# Patient Record
Sex: FEMALE | Race: WHITE | NOT HISPANIC OR LATINO | Employment: OTHER | URBAN - METROPOLITAN AREA
[De-identification: names, ages, dates, MRNs, and addresses within clinical notes are randomized per-mention and may not be internally consistent; named-entity substitution may affect disease eponyms.]

---

## 2017-01-18 ENCOUNTER — ALLSCRIPTS OFFICE VISIT (OUTPATIENT)
Dept: OTHER | Facility: OTHER | Age: 67
End: 2017-01-18

## 2017-02-14 ENCOUNTER — ALLSCRIPTS OFFICE VISIT (OUTPATIENT)
Dept: OTHER | Facility: OTHER | Age: 67
End: 2017-02-14

## 2017-02-14 DIAGNOSIS — Z12.39 ENCOUNTER FOR OTHER SCREENING FOR MALIGNANT NEOPLASM OF BREAST: ICD-10-CM

## 2017-03-15 ENCOUNTER — GENERIC CONVERSION - ENCOUNTER (OUTPATIENT)
Dept: OTHER | Facility: OTHER | Age: 67
End: 2017-03-15

## 2017-03-15 LAB
A/G RATIO (HISTORICAL): 2.3 (ref 1.2–2.2)
ALBUMIN SERPL BCP-MCNC: 4.6 G/DL (ref 3.6–4.8)
ALP SERPL-CCNC: 57 IU/L (ref 39–117)
ALT SERPL W P-5'-P-CCNC: 26 IU/L (ref 0–32)
AST SERPL W P-5'-P-CCNC: 25 IU/L (ref 0–40)
BILIRUB SERPL-MCNC: 0.3 MG/DL (ref 0–1.2)
BUN SERPL-MCNC: 13 MG/DL (ref 8–27)
BUN/CREA RATIO (HISTORICAL): 19 (ref 11–26)
CALCIUM SERPL-MCNC: 10.7 MG/DL (ref 8.7–10.3)
CHLORIDE SERPL-SCNC: 104 MMOL/L (ref 96–106)
CHOLEST SERPL-MCNC: 194 MG/DL (ref 100–199)
CO2 SERPL-SCNC: 23 MMOL/L (ref 18–29)
CREAT SERPL-MCNC: 0.68 MG/DL (ref 0.57–1)
EGFR AFRICAN AMERICAN (HISTORICAL): 107 ML/MIN/1.73
EGFR-AMERICAN CALC (HISTORICAL): 93 ML/MIN/1.73
GLUCOSE SERPL-MCNC: 94 MG/DL (ref 65–99)
HDLC SERPL-MCNC: 81 MG/DL
LDLC SERPL CALC-MCNC: 100 MG/DL (ref 0–99)
POTASSIUM SERPL-SCNC: 4.7 MMOL/L (ref 3.5–5.2)
SODIUM SERPL-SCNC: 141 MMOL/L (ref 134–144)
TOT. GLOBULIN, SERUM (HISTORICAL): 2 G/DL (ref 1.5–4.5)
TOTAL PROTEIN (HISTORICAL): 6.6 G/DL (ref 6–8.5)
TRIGL SERPL-MCNC: 66 MG/DL (ref 0–149)
TSH SERPL DL<=0.05 MIU/L-ACNC: 5.74 UIU/ML (ref 0.45–4.5)

## 2017-03-16 LAB
INTERPRETATION (HISTORICAL): NORMAL
PTH-INTACT SERPL-MCNC: 92 PG/ML (ref 15–65)

## 2017-03-20 ENCOUNTER — GENERIC CONVERSION - ENCOUNTER (OUTPATIENT)
Dept: OTHER | Facility: OTHER | Age: 67
End: 2017-03-20

## 2017-04-21 ENCOUNTER — ALLSCRIPTS OFFICE VISIT (OUTPATIENT)
Dept: OTHER | Facility: OTHER | Age: 67
End: 2017-04-21

## 2017-04-21 DIAGNOSIS — S20.219A CONTUSION OF FRONT WALL OF THORAX: ICD-10-CM

## 2017-07-11 ENCOUNTER — HOSPITAL ENCOUNTER (OUTPATIENT)
Dept: RADIOLOGY | Facility: CLINIC | Age: 67
Discharge: HOME | End: 2017-07-11
Payer: COMMERCIAL

## 2017-07-11 ENCOUNTER — GENERIC CONVERSION - ENCOUNTER (OUTPATIENT)
Dept: OTHER | Facility: OTHER | Age: 67
End: 2017-07-11

## 2017-07-11 ENCOUNTER — TRANSCRIBE ORDERS (OUTPATIENT)
Dept: RADIOLOGY | Facility: CLINIC | Age: 67
End: 2017-07-11

## 2017-07-11 DIAGNOSIS — Z12.39 ENCOUNTER FOR OTHER SCREENING FOR MALIGNANT NEOPLASM OF BREAST: ICD-10-CM

## 2017-07-11 PROCEDURE — G0202 SCR MAMMO BI INCL CAD: HCPCS

## 2017-07-16 ENCOUNTER — GENERIC CONVERSION - ENCOUNTER (OUTPATIENT)
Dept: OTHER | Facility: OTHER | Age: 67
End: 2017-07-16

## 2017-09-15 ENCOUNTER — TRANSCRIBE ORDERS (OUTPATIENT)
Dept: ADMINISTRATIVE | Facility: HOSPITAL | Age: 67
End: 2017-09-15

## 2017-09-15 ENCOUNTER — GENERIC CONVERSION - ENCOUNTER (OUTPATIENT)
Dept: OTHER | Facility: OTHER | Age: 67
End: 2017-09-15

## 2017-09-15 DIAGNOSIS — E83.52 HYPERCALCEMIA: Primary | ICD-10-CM

## 2017-11-06 ENCOUNTER — ALLSCRIPTS OFFICE VISIT (OUTPATIENT)
Dept: OTHER | Facility: OTHER | Age: 67
End: 2017-11-06

## 2017-11-07 NOTE — PROGRESS NOTES
Assessment  1  Benign essential hypertension (401 1) (I10)   2  Hypercholesterolemia (272 0) (E78 00)   3  Hyperparathyroidism (252 00) (E21 3)   4  Mild intermittent asthma without complication (245 91) (H07 03)    Plan  Benign essential hypertension    · Valsartan 80 MG Oral Tablet; Take 1 tablet daily  Health Maintenance    · Fluzone High-Dose 0 5 ML Intramuscular Suspension Prefilled Syringe  Hypercholesterolemia    · Rosuvastatin Calcium 10 MG Oral Tablet; take 1 tablet daily at bedtime    Discussion/Summary    Endocrinologist consult and recent blood work was requested from the specialist to the office in 1 year  The treatment plan was reviewed with the patient/guardian  The patient/guardian understands and agrees with the treatment plan      Chief Complaint  Patient presents for f u chronic conditions  Patient states she does not know why she is here, she is not sick  nil/lpn   Patient is here today for follow up of chronic conditions described in HPI  History of Present Illness  74 y o f sen for f/u HLD, HTN, Asthma, Hyperparathyroidism -went to see endocrinologist 2 months ago, all stable, taking meds as prescribed except for Flovent -started to use 3 times a week, no recent asthma issues, in the past asthma was exacerbated by cold symptoms, patient stop smoking more than 10 years ago which helped her asthma symptoms, also patient is trying to avoid certain allergens like mold that may trigger her asthma      Review of Systems    Constitutional: No fever, no chills, feels well, no tiredness, no recent weight gain or weight loss  Eyes: No complaints of eye pain, no red eyes, no eyesight problems, no discharge, no dry eyes, no itching of eyes  ENT: no complaints of earache, no loss of hearing, no nose bleeds, no nasal discharge, no sore throat, no hoarseness     Cardiovascular: No complaints of slow heart rate, no fast heart rate, no chest pain, no palpitations, no leg claudication, no lower extremity edema  Respiratory: No complaints of shortness of breath, no wheezing, no cough, no SOB on exertion, no orthopnea, no PND  Gastrointestinal: No complaints of abdominal pain, no constipation, no nausea or vomiting, no diarrhea, no bloody stools  Genitourinary: No complaints of dysuria, no incontinence, no pelvic pain, no dysmenorrhea, no vaginal discharge or bleeding  Musculoskeletal: No complaints of arthralgias, no myalgias, no joint swelling or stiffness, no limb pain or swelling  Integumentary: No complaints of skin rash or lesions, no itching, no skin wounds, no breast pain or lump  Neurological: No complaints of headache, no confusion, no convulsions, no numbness, no dizziness or fainting, no tingling, no limb weakness, no difficulty walking  Psychiatric: Not suicidal, no sleep disturbance, no anxiety or depression, no change in personality, no emotional problems  Endocrine: No complaints of proptosis, no hot flashes, no muscle weakness, no deepening of the voice, no feelings of weakness  Hematologic/Lymphatic: No complaints of swollen glands, no swollen glands in the neck, does not bleed easily, does not bruise easily  Active Problems  1  Asthma (493 90) (J45 909)   2  Benign essential hypertension (401 1) (I10)   3  BMI 28 0-28 9,adult (V85 24) (Z68 28)   4  Encounter for other screening for malignant neoplasm of breast (V76 19) (Z12 39)   5  Encounter for screening colonoscopy (V76 51) (Z12 11)   6  Encounter for screening mammogram for malignant neoplasm of breast (V76 12)   (Z12 31)   7  Hearing Loss (389 9)   8  Hypercholesterolemia (272 0) (E78 00)   9  Hyperparathyroidism (252 00) (E21 3)   10  Well adult on routine health check (V70 0) (Z00 00)    Surgical History  1  History of Ear Pressure Equalization Tube, Insertion, Bilaterally    The surgical history was reviewed and updated today  Family History  Mother    1   Family history of Macular Degeneration  Family History    2  Family history of Denial Of Any Significant Medical History   3  Denied: Family history of colon cancer   4  Denied: Family history of Crohn's disease   5  Denied: Family history of liver disease    The family history was reviewed and updated today  Social History   · Former smoker (Y84 29) (V30 309)   · No drug use   · Social alcohol use (Z78 9)  The social history was reviewed and updated today  Current Meds   1  ProAir  (90 Base) MCG/ACT Inhalation Aerosol Solution; 2 puffs INH every 4 h as   needed; Therapy: 76KLB2359 to (Last Rx:51Uae0099)  Requested for: 74SIA8456 Ordered   2  Rosuvastatin Calcium 10 MG Oral Tablet; take 1 tablet daily at bedtime; Therapy: 56JLV2043 to (Last Rx:54Tyx7729)  Requested for: 25Tvd2714 Ordered   3  Valsartan 80 MG Oral Tablet; Take 1 tablet daily; Therapy: 41YSI2340 to (Last Rx:92Wuc6267)  Requested for: 42Mux0622 Ordered    The medication list was reviewed and updated today  Allergies  1  No Known Drug Allergies    Vitals  Vital Signs    Recorded: 59NWW6972 10:55AM   Temperature 98 3 F   Heart Rate 80   Respiration Quality Normal   Respiration 16   Systolic 655   Diastolic 70   Height 5 ft 8 in   Weight 183 lb    BMI Calculated 27 83   BSA Calculated 1 97     Physical Exam    Constitutional   General appearance: No acute distress, well appearing and well nourished  Pulmonary   Respiratory effort: No increased work of breathing or signs of respiratory distress  Auscultation of lungs: Clear to auscultation  Cardiovascular   Auscultation of heart: Normal rate and rhythm, normal S1 and S2, without murmurs  Examination of extremities for edema and/or varicosities: Normal     Neurologic   Cranial nerves: Cranial nerves 2-12 intact      Psychiatric   Mood and affect: Normal          Results/Data  Falls Risk Assessment (Dx Z13 89 Screen for Neurologic Disorder) 04TDR9334 11:00AM User, Ahs     Test Name Result Flag Reference   Falls Risk      No falls in the past year       Health Management  Encounter for other screening for malignant neoplasm of breast   * MAMMO SCREENING BILATERAL W CAD; every 1 year; Last 84VXU4486; Next Due: 13Awe5389; Active  Encounter for screening mammogram for malignant neoplasm of breast   Digital Bilateral Screening Mammogram With CAD; every 1 year; Last 77LTJ4064; Next Due:  38WXL8069;  Overdue    Signatures   Electronically signed by : HERMES Jessica ; Nov 6 2017 11:34AM EST                       (Author)

## 2018-01-12 VITALS
DIASTOLIC BLOOD PRESSURE: 76 MMHG | TEMPERATURE: 98.7 F | SYSTOLIC BLOOD PRESSURE: 122 MMHG | OXYGEN SATURATION: 97 % | HEART RATE: 77 BPM

## 2018-01-12 VITALS
HEIGHT: 68 IN | TEMPERATURE: 98.4 F | BODY MASS INDEX: 28.01 KG/M2 | DIASTOLIC BLOOD PRESSURE: 78 MMHG | SYSTOLIC BLOOD PRESSURE: 120 MMHG | HEART RATE: 80 BPM | RESPIRATION RATE: 16 BRPM | WEIGHT: 184.8 LBS

## 2018-01-13 VITALS
BODY MASS INDEX: 27.74 KG/M2 | HEART RATE: 80 BPM | TEMPERATURE: 98.3 F | WEIGHT: 183 LBS | RESPIRATION RATE: 16 BRPM | HEIGHT: 68 IN | DIASTOLIC BLOOD PRESSURE: 70 MMHG | SYSTOLIC BLOOD PRESSURE: 120 MMHG

## 2018-01-13 VITALS
DIASTOLIC BLOOD PRESSURE: 80 MMHG | WEIGHT: 189 LBS | HEIGHT: 68 IN | SYSTOLIC BLOOD PRESSURE: 134 MMHG | RESPIRATION RATE: 16 BRPM | HEART RATE: 76 BPM | TEMPERATURE: 98 F | BODY MASS INDEX: 28.64 KG/M2

## 2018-01-15 NOTE — RESULT NOTES
Message   pth is elevated as well as tsh   recommend followup with endocrinology  rl      Verified Results  (1) COMPREHENSIVE METABOLIC PANEL 03LDW9643 49:47IJ Faustine Muse     Test Name Result Flag Reference   Glucose, Serum 94 mg/dL  65-99   BUN 13 mg/dL  8-27   Creatinine, Serum 0 68 mg/dL  0 57-1 00   eGFR If NonAfricn Am 93 mL/min/1 73  >59   eGFR If Africn Am 107 mL/min/1 73  >59   BUN/Creatinine Ratio 19  11-26   Sodium, Serum 141 mmol/L  134-144   Potassium, Serum 4 7 mmol/L  3 5-5 2   Chloride, Serum 104 mmol/L     Carbon Dioxide, Total 23 mmol/L  18-29   Calcium, Serum 10 7 mg/dL H 8 7-10 3   **Verified by repeat analysis**   Protein, Total, Serum 6 6 g/dL  6 0-8 5   Albumin, Serum 4 6 g/dL  3 6-4 8   Globulin, Total 2 0 g/dL  1 5-4 5   A/G Ratio 2 3 H 1 2-2 2   **Please note reference interval change**   Bilirubin, Total 0 3 mg/dL  0 0-1 2   Alkaline Phosphatase, S 57 IU/L     AST (SGOT) 25 IU/L  0-40   ALT (SGPT) 26 IU/L  0-32     (1) LIPID PANEL FASTING W DIRECT LDL REFLEX 63AWL2811 10:41AM Faustine Marion     Test Name Result Flag Reference   Cholesterol, Total 194 mg/dL  100-199   Triglycerides 66 mg/dL  0-149   HDL Cholesterol 81 mg/dL  >39   LDL Cholesterol Calc 100 mg/dL H 0-99     (1) TSH 94KLF1307 10:41AM Faustine Muse     Test Name Result Flag Reference   TSH 5 740 uIU/mL H 0 450-4 500     (1) PTH N-TERMINAL (INTACT) 12VRV5493 10:41AM Faustine Muse     Test Name Result Flag Reference   PTH, Intact 92 pg/mL H 15-65     [facility]) Cardiovascular Risk Assessment 40YKM1709 10:41AM Faustine Muse     Test Name Result Flag Reference   Interpretation Note     Supplement report is available  PDF Image

## 2018-01-15 NOTE — RESULT NOTES
Discussion/Summary   Your mammogram is normal   Please repeat mammogram in 1 year  Dr Anna Ferrell 02:05PM Laurie Robertson Order Number: [id number]    - Patient Instructions: To schedule this appointment, please contact Central Scheduling at [phone]  Do not wear any perfume, powder, lotion or deodorant on breast or underarm area  Please bring your doctors order, referral (if needed) and insurance information with you on the day of the test  Failure to bring this information may result in this test being rescheduled  Arrive 15 minutes prior to your appointment time to register  On the day of your test, please bring any prior mammogram or breast studies with you that were not performed at a [facility]  Failure to bring prior exams may result in your test needing to be rescheduled  Test Name Result Flag Reference   MAMMO SCREENING BILATERAL W CAD (Report)     Patient History:   Patient is postmenopausal and is nulliparous  No known family history of cancer  Patient is a former smoker, and smoked for 20 years  Patient's    BMI is 25 8  Reason for exam: screening, asymptomatic  Mammo Screening Bilateral W CAD: July 11, 2017 - Check In #:    [de-identified]   Bilateral MLO and CC view(s) were taken  XCCL view(s) were taken   of the left breast      Technologist: RACHID Brock   Prior study comparison: May 18, 2015, bilateral screening    mammogram, performed at [address]  April 14, 2014, bilateral screening mammogram, performed at [address]  May 17, 2012, bilateral    screening mammogram, performed at [address]  September 30, 2008, screening mammogram, performed at    [address]  May 2, 2007, screening    mammogram, performed at [address]       There are scattered fibroglandular densities  No dominant soft tissue mass, architectural distortion or    suspicious calcifications are noted in either breast  The skin    and nipple contours are within normal limits  No evidence of malignancy  No significant changes when compared with prior studies  ACR BI-RADSï¾® Assessments: BiRad:1 - Negative     Recommendation:   Routine screening mammogram of both breasts in 1 year  A    reminder letter will be scheduled  Analyzed by CAD     The patient is scheduled in a reminder system  8-10% of cancers will be missed on mammography  Management of a    palpable abnormality must be based on clinical grounds  Patients   will be notified of their results via letter from our facility  Accredited by [employer] of [employer] and FDA       [facility]: [id number]     Risk Value(s):   Tyrer-Cuzick 10 Year: 4 600%, Tyrer-Cuzick Lifetime: 9 800%,    Myriad Table: 1 5%, KARLA 5 Year: 1 6%, NCI Lifetime: 6 6%   Signed by:   Susana Chavez MD   7/11/17

## 2018-01-25 ENCOUNTER — OFFICE VISIT (OUTPATIENT)
Dept: FAMILY MEDICINE CLINIC | Facility: CLINIC | Age: 68
End: 2018-01-25
Payer: COMMERCIAL

## 2018-01-25 VITALS
HEIGHT: 70 IN | RESPIRATION RATE: 20 BRPM | SYSTOLIC BLOOD PRESSURE: 122 MMHG | BODY MASS INDEX: 26.48 KG/M2 | OXYGEN SATURATION: 95 % | TEMPERATURE: 97.8 F | HEART RATE: 94 BPM | DIASTOLIC BLOOD PRESSURE: 80 MMHG | WEIGHT: 185 LBS

## 2018-01-25 DIAGNOSIS — J01.90 ACUTE NON-RECURRENT SINUSITIS, UNSPECIFIED LOCATION: Primary | ICD-10-CM

## 2018-01-25 DIAGNOSIS — J45.901 MILD ASTHMA WITH EXACERBATION, UNSPECIFIED WHETHER PERSISTENT: ICD-10-CM

## 2018-01-25 PROCEDURE — 99214 OFFICE O/P EST MOD 30 MIN: CPT | Performed by: FAMILY MEDICINE

## 2018-01-25 RX ORDER — ALBUTEROL SULFATE 90 UG/1
2 AEROSOL, METERED RESPIRATORY (INHALATION) EVERY 4 HOURS PRN
COMMUNITY

## 2018-01-25 RX ORDER — VALSARTAN 80 MG/1
80 TABLET ORAL DAILY
COMMUNITY
End: 2018-07-18 | Stop reason: ALTCHOICE

## 2018-01-25 RX ORDER — BENZONATATE 200 MG/1
200 CAPSULE ORAL 3 TIMES DAILY PRN
Qty: 20 CAPSULE | Refills: 0 | Status: SHIPPED | OUTPATIENT
Start: 2018-01-25 | End: 2018-02-04

## 2018-01-25 RX ORDER — AZITHROMYCIN 250 MG/1
500 TABLET, FILM COATED ORAL EVERY 24 HOURS
Status: CANCELLED | OUTPATIENT
Start: 2018-01-25

## 2018-01-25 RX ORDER — AZITHROMYCIN 250 MG/1
TABLET, FILM COATED ORAL
Qty: 6 TABLET | Refills: 0 | Status: SHIPPED | OUTPATIENT
Start: 2018-01-25 | End: 2018-01-29

## 2018-01-25 RX ORDER — ROSUVASTATIN CALCIUM 10 MG/1
10 TABLET, COATED ORAL DAILY
COMMUNITY
End: 2018-12-02 | Stop reason: SDUPTHER

## 2018-01-25 RX ORDER — PREDNISONE 20 MG/1
20 TABLET ORAL DAILY
Qty: 12 TABLET | Refills: 0 | Status: SHIPPED | OUTPATIENT
Start: 2018-01-25 | End: 2018-03-29

## 2018-01-25 NOTE — PATIENT INSTRUCTIONS
Rhinosinusitis   WHAT YOU NEED TO KNOW:   Rhinosinusitis (RS) is inflammation of your nose and sinuses  It commonly begins as a virus, often as a common cold  Viruses usually last 7 to 10 days and do not need treatment  When the virus does not get better on its own, you may have bacterial RS  This means that bacteria have begun to grow inside your sinuses  Acute RS lasts less than 4 weeks  Chronic RS lasts 12 weeks or more  Recurrent RS is when you have 4 or more episodes of RS in one year  DISCHARGE INSTRUCTIONS:   Return to the emergency department if:   · Your eye and eyelid are red, swollen, and painful  · You cannot open your eye  · You have double vision or you cannot see  · Your eyeball bulges out or you cannot move your eye  · You are more sleepy than normal or you notice changes in your ability to think, move, or talk  · You have a stiff neck, a fever, or a bad headache  · You have swelling of your forehead or scalp  Contact your healthcare provider if:   · Your symptoms are worse or do not improve after 3 to 5 days of treatment  · You have questions or concerns about your condition or care  Medicines: You may need any of the following:  · Acetaminophen  decreases pain and fever  It is available without a doctor's order  Ask how much to take and how often to take it  Follow directions  Acetaminophen can cause liver damage if not taken correctly  · NSAIDs , such as ibuprofen, help decrease swelling, pain, and fever  This medicine is available with or without a doctor's order  NSAIDs can cause stomach bleeding or kidney problems in certain people  If you take blood thinner medicine, always ask your healthcare provider if NSAIDs are safe for you  Always read the medicine label and follow directions  · Nasal steroid sprays  decrease inflammation in your nose and sinuses  · Decongestants  reduce swelling and drain mucus in the nose and sinuses   They may help you breathe easier  · Antihistamines  dry mucus in the nose and relieve sneezing  · Antibiotics  treat a bacterial infection and may be needed if your symptoms do not improve or they get worse  · Take your medicine as directed  Contact your healthcare provider if you think your medicine is not helping or if you have side effects  Tell him or her if you are allergic to any medicine  Keep a list of the medicines, vitamins, and herbs you take  Include the amounts, and when and why you take them  Bring the list or the pill bottles to follow-up visits  Carry your medicine list with you in case of an emergency  Self-care:   · Rinse your sinuses  Use a sinus rinse device to rinse your nasal passages with a saline (salt water) solution  This will help thin the mucus in your nose and rinse away pollen and dirt  It will also help reduce swelling so you can breathe normally  Ask your healthcare provider how often to do this  · Breathe in steam   Heat a bowl of water until you see steam  Lean over the bowl and make a tent over your head with a large towel  Breathe deeply for about 20 minutes  Be careful not to get too close to the steam or burn yourself  Do this 3 times a day  You can also breathe deeply when you take a hot shower  · Sleep with your head elevated  Place an extra pillow under your head before you go to sleep to help your sinuses drain  · Drink liquids as directed  Ask your healthcare provider how much liquid to drink each day and which liquids are best for you  Liquids will thin the mucus in your nose and help it drain  Avoid drinks that contain alcohol or caffeine  · Do not smoke, and avoid secondhand smoke  Nicotine and other chemicals in cigarettes and cigars can make your symptoms worse  Ask your healthcare provider for information if you currently smoke and need help to quit  E-cigarettes or smokeless tobacco still contain nicotine   Talk to your healthcare provider before you use these products  Follow up with your healthcare provider as directed: Follow up if your symptoms are worse or not better after 3 to 5 days of treatment  Write down your questions so you remember to ask them during your visits  © 2017 [address] [employer] Information is for End User's use only and may not be sold, redistributed or otherwise used for commercial purposes  All illustrations and images included in CareNotes® are the copyrighted property of A D A M [employer] Inc  or Rei Ramos  The above information is an  only  It is not intended as medical advice for individual conditions or treatments  Talk to your doctor, nurse or pharmacist before following any medical regimen to see if it is safe and effective for you

## 2018-01-25 NOTE — PROGRESS NOTES
Chief Complaint   Patient presents with    Sinus Problem     pressure and pain x 1 day     Cough     x 1 day        Patient ID: Jarrett Carlos is a 74 y o  female  Pt seen and examined  Needing to use rescue inhaler daily  Usually doesn't need it everyday  Dyspnea and cough over a week    Woke up today with headache and sinus congestion      Sinus Problem   This is a new problem  The current episode started today  The problem has been gradually worsening since onset  There has been no fever  The pain is moderate  Associated symptoms include congestion, coughing, headaches, shortness of breath and sinus pressure  Pertinent negatives include no chills, diaphoresis, ear pain, hoarse voice, sneezing or sore throat  Past treatments include nothing  Cough   This is a new problem  The current episode started 1 to 4 weeks ago  The problem has been gradually worsening  The problem occurs constantly  The cough is productive of sputum  Associated symptoms include headaches and shortness of breath  Pertinent negatives include no chills, ear pain or sore throat  She has tried a beta-agonist inhaler and steroid inhaler for the symptoms  The treatment provided mild relief  Her past medical history is significant for asthma  The following portions of the patient's history were reviewed and updated as appropriate: allergies, current medications, past family history, past medical history, past social history, past surgical history and problem list     Review of Systems   Constitutional: Negative for chills and diaphoresis  HENT: Positive for congestion and sinus pressure  Negative for ear pain, hoarse voice, sneezing and sore throat  Respiratory: Positive for cough and shortness of breath  Neurological: Positive for headaches         Current Outpatient Prescriptions   Medication Sig Dispense Refill    albuterol (PROVENTIL HFA,VENTOLIN HFA) 90 mcg/act inhaler Inhale 2 puffs every 4 (four) hours as needed for wheezing      rosuvastatin (CRESTOR) 10 MG tablet Take 10 mg by mouth daily      valsartan (DIOVAN) 80 mg tablet Take 80 mg by mouth daily      azithromycin (ZITHROMAX) 250 mg tablet 2 tabs x 1 day then 1 tab x 4 days 6 tablet 0    benzonatate (TESSALON) 200 MG capsule Take 1 capsule by mouth 3 (three) times a day as needed for cough for up to 10 days 20 capsule 0    predniSONE 20 mg tablet Take 1 tablet by mouth daily 2 tabs x 3 days then 1 tab x 3 days then 0 5 tabs x 3 days 12 tablet 0     No current facility-administered medications for this visit  Objective:    /80 (BP Location: Left arm, Patient Position: Sitting, Cuff Size: Adult)   Pulse 94   Temp 97 8 °F (36 6 °C) (Temporal)   Resp 20   Ht 5' 10" (1 778 m)   Wt 83 9 kg (185 lb)   SpO2 95%   BMI 26 54 kg/m²        Physical Exam   Constitutional: She appears well-developed and well-nourished  No distress  HENT:   Right Ear: Tympanic membrane is bulging  Left Ear: Tympanic membrane is bulging  Nose: Mucosal edema and rhinorrhea present  Mouth/Throat: Mucous membranes are normal  Posterior oropharyngeal erythema present  +post nasal drip   Neck: Normal range of motion  Neck supple  Cardiovascular: Normal rate and regular rhythm  Pulmonary/Chest: Effort normal and breath sounds normal    Lymphadenopathy:     She has no cervical adenopathy  Skin: Skin is warm and dry  Assessment/Plan:         Diagnoses and all orders for this visit:    Acute non-recurrent sinusitis, unspecified location  Comments:  will treat with abx    Orders:  -     azithromycin (ZITHROMAX) 250 mg tablet; 2 tabs x 1 day then 1 tab x 4 days  -     benzonatate (TESSALON) 200 MG capsule;  Take 1 capsule by mouth 3 (three) times a day as needed for cough for up to 10 days    Mild asthma with exacerbation, unspecified whether persistent  Comments:  start prednisone taper  use tessalon perles for cough  use flovent everyday  Orders:  - predniSONE 20 mg tablet; Take 1 tablet by mouth daily 2 tabs x 3 days then 1 tab x 3 days then 0 5 tabs x 3 days    Other orders  -     rosuvastatin (CRESTOR) 10 MG tablet; Take 10 mg by mouth daily  -     valsartan (DIOVAN) 80 mg tablet; Take 80 mg by mouth daily  -     albuterol (PROVENTIL HFA,VENTOLIN HFA) 90 mcg/act inhaler; Inhale 2 puffs every 4 (four) hours as needed for wheezing  -     Cancel: azithromycin (ZITHROMAX) tablet 500 mg; Take 2 tablets by mouth every 24 hours                        Return if symptoms worsen or fail to improve          Governor Zev, DO

## 2018-01-30 ENCOUNTER — OFFICE VISIT (OUTPATIENT)
Dept: FAMILY MEDICINE CLINIC | Facility: CLINIC | Age: 68
End: 2018-01-30
Payer: COMMERCIAL

## 2018-01-30 ENCOUNTER — APPOINTMENT (OUTPATIENT)
Dept: RADIOLOGY | Facility: CLINIC | Age: 68
End: 2018-01-30
Payer: COMMERCIAL

## 2018-01-30 ENCOUNTER — TELEPHONE (OUTPATIENT)
Dept: FAMILY MEDICINE CLINIC | Facility: CLINIC | Age: 68
End: 2018-01-30

## 2018-01-30 ENCOUNTER — TRANSCRIBE ORDERS (OUTPATIENT)
Dept: RADIOLOGY | Facility: CLINIC | Age: 68
End: 2018-01-30

## 2018-01-30 VITALS
OXYGEN SATURATION: 98 % | BODY MASS INDEX: 26.48 KG/M2 | WEIGHT: 185 LBS | TEMPERATURE: 98.1 F | RESPIRATION RATE: 24 BRPM | DIASTOLIC BLOOD PRESSURE: 80 MMHG | HEIGHT: 70 IN | HEART RATE: 76 BPM | SYSTOLIC BLOOD PRESSURE: 132 MMHG

## 2018-01-30 DIAGNOSIS — J40 BRONCHITIS: ICD-10-CM

## 2018-01-30 DIAGNOSIS — R06.02 SHORTNESS OF BREATH: Primary | ICD-10-CM

## 2018-01-30 DIAGNOSIS — R06.02 SHORTNESS OF BREATH: ICD-10-CM

## 2018-01-30 PROCEDURE — 99213 OFFICE O/P EST LOW 20 MIN: CPT | Performed by: NURSE PRACTITIONER

## 2018-01-30 PROCEDURE — 71046 X-RAY EXAM CHEST 2 VIEWS: CPT

## 2018-01-30 RX ORDER — CEFUROXIME AXETIL 500 MG/1
500 TABLET ORAL EVERY 12 HOURS SCHEDULED
Qty: 20 TABLET | Refills: 0 | Status: SHIPPED | OUTPATIENT
Start: 2018-01-30 | End: 2018-02-09

## 2018-01-30 NOTE — TELEPHONE ENCOUNTER
----- Message from Georgianna Goodell, [address] sent at 1/30/2018  4:18 PM EST -----  CXR was negative for pneumonia, acute process    Call with update of condition on thursday

## 2018-01-30 NOTE — PATIENT INSTRUCTIONS

## 2018-01-30 NOTE — TELEPHONE ENCOUNTER
Spoke with pt, she is aware of chest xray results , and is very grateful you were able to see her today    Pete Barriga

## 2018-01-30 NOTE — TELEPHONE ENCOUNTER
Dr Pam Vaughan,    Patient said she saw you a few days ago for a sinus infection and she said it has now gone to her chest and she was wondering if you can call something else in for her or if she needs to come in again  Please call patient and advise  Thank you

## 2018-03-29 ENCOUNTER — TELEPHONE (OUTPATIENT)
Dept: FAMILY MEDICINE CLINIC | Facility: CLINIC | Age: 68
End: 2018-03-29

## 2018-03-29 ENCOUNTER — OFFICE VISIT (OUTPATIENT)
Dept: FAMILY MEDICINE CLINIC | Facility: CLINIC | Age: 68
End: 2018-03-29
Payer: COMMERCIAL

## 2018-03-29 VITALS
BODY MASS INDEX: 26.31 KG/M2 | OXYGEN SATURATION: 95 % | RESPIRATION RATE: 16 BRPM | HEART RATE: 72 BPM | DIASTOLIC BLOOD PRESSURE: 78 MMHG | WEIGHT: 183.8 LBS | HEIGHT: 70 IN | TEMPERATURE: 97.8 F | SYSTOLIC BLOOD PRESSURE: 120 MMHG

## 2018-03-29 DIAGNOSIS — J45.909 UNCOMPLICATED ASTHMA, UNSPECIFIED ASTHMA SEVERITY, UNSPECIFIED WHETHER PERSISTENT: Primary | ICD-10-CM

## 2018-03-29 DIAGNOSIS — J45.901 MODERATE ASTHMA WITH ACUTE EXACERBATION, UNSPECIFIED WHETHER PERSISTENT: Primary | ICD-10-CM

## 2018-03-29 DIAGNOSIS — J06.9 VIRAL UPPER RESPIRATORY TRACT INFECTION: ICD-10-CM

## 2018-03-29 PROCEDURE — 99214 OFFICE O/P EST MOD 30 MIN: CPT | Performed by: FAMILY MEDICINE

## 2018-03-29 RX ORDER — MONTELUKAST SODIUM 10 MG/1
10 TABLET ORAL
Qty: 30 TABLET | Refills: 3 | Status: SHIPPED | OUTPATIENT
Start: 2018-03-29 | End: 2019-09-12 | Stop reason: ALTCHOICE

## 2018-03-29 RX ORDER — FLUTICASONE PROPIONATE 50 MCG
1 SPRAY, SUSPENSION (ML) NASAL DAILY
Qty: 16 G | Refills: 0 | Status: SHIPPED | OUTPATIENT
Start: 2018-03-29 | End: 2019-09-12 | Stop reason: ALTCHOICE

## 2018-03-29 NOTE — PATIENT INSTRUCTIONS
Start singulair and call with dose of flovent   May need to change dose or switch to advair  Start flonase        Symptoms consistent with viral infection and no antibiotics are needed at this time  Supportive care discussed including increasing fluid intake and rest   Recommend buckwheat honey for cough    Follow up recommend if no improvement in 7-14 days or worsening of symptoms

## 2018-03-29 NOTE — PROGRESS NOTES
Assessment/Plan:    Problem List Items Addressed This Visit     Asthma - Primary     Recommend calling with dose of flovent because we may need to increase it  Will start on singulair         Relevant Medications    montelukast (SINGULAIR) 10 mg tablet      Other Visit Diagnoses     Viral upper respiratory tract infection        will start on flonase  if symptoms persist past 7-10 days return to office     Relevant Medications    fluticasone (FLONASE) 50 mcg/act nasal spray          Patient Instructions   Start singulair and call with dose of flovent   May need to change dose or switch to advair  Start flonase        Symptoms consistent with viral infection and no antibiotics are needed at this time  Supportive care discussed including increasing fluid intake and rest   Recommend buckwheat honey for cough  Follow up recommend if no improvement in 7-14 days or worsening of symptoms         Return in about 4 weeks (around 4/26/2018)  Subjective:      Patient ID: Zach Streeter is a 74 y o  female  Chief Complaint   Patient presents with    Cough    Headache       Pt seen and examined  Is concerned that she has a sinus infection  Symptoms x5 day  Using rescue inhaler more often  Stated that she uses her flovent 1 x a day  + congestion  No fever      Pt feels that when she is out walking the dog-- she will become sob  Feels it in her lungs  Feels as if someone is sitting on her chest     Refuses to get EKG today        Cough   This is a new problem  The current episode started in the past 7 days  The cough is productive of sputum  Associated symptoms include ear congestion, headaches, nasal congestion, postnasal drip and shortness of breath  Pertinent negatives include no chest pain, chills, ear pain, fever, heartburn, hemoptysis, myalgias, rash, rhinorrhea, sore throat, weight loss or wheezing  Her past medical history is significant for asthma  Headache    Associated symptoms include coughing  Pertinent negatives include no ear pain, fever, rhinorrhea, sore throat or weight loss  The following portions of the patient's history were reviewed and updated as appropriate: allergies, current medications, past family history, past medical history, past social history, past surgical history and problem list     Review of Systems   Constitutional: Negative for chills, fever and weight loss  HENT: Positive for postnasal drip  Negative for ear pain, rhinorrhea and sore throat  Respiratory: Positive for cough and shortness of breath  Negative for hemoptysis and wheezing  Cardiovascular: Negative for chest pain, palpitations and leg swelling  Gastrointestinal: Negative for heartburn  Musculoskeletal: Negative for myalgias  Skin: Negative for rash  Neurological: Positive for headaches  Hematological: Negative for adenopathy  Current Outpatient Prescriptions   Medication Sig Dispense Refill    albuterol (PROVENTIL HFA,VENTOLIN HFA) 90 mcg/act inhaler Inhale 2 puffs every 4 (four) hours as needed for wheezing      rosuvastatin (CRESTOR) 10 MG tablet Take 10 mg by mouth daily      valsartan (DIOVAN) 80 mg tablet Take 80 mg by mouth daily      fluticasone (FLONASE) 50 mcg/act nasal spray 1 spray into each nostril daily 16 g 0    montelukast (SINGULAIR) 10 mg tablet Take 1 tablet (10 mg total) by mouth daily at bedtime 30 tablet 3     No current facility-administered medications for this visit  Objective:    /78 (BP Location: Left arm, Patient Position: Sitting, Cuff Size: Standard)   Pulse 72   Temp 97 8 °F (36 6 °C)   Resp 16   Ht 5' 10" (1 778 m)   Wt 83 4 kg (183 lb 12 8 oz)   SpO2 95%   BMI 26 37 kg/m²        Physical Exam   Constitutional: She appears well-nourished  No distress  HENT:   Right Ear: Tympanic membrane is bulging  Left Ear: Tympanic membrane is bulging  Nose: Mucosal edema and rhinorrhea present     Mouth/Throat: Mucous membranes are normal  Posterior oropharyngeal erythema present  +post nasal drip   Neck: Normal range of motion  Neck supple  Cardiovascular: Normal rate and regular rhythm  Pulmonary/Chest: Effort normal and breath sounds normal    Lymphadenopathy:     She has no cervical adenopathy  Skin: Skin is warm and dry                Junious Emma, DO

## 2018-03-30 RX ORDER — FLUTICASONE PROPIONATE 220 UG/1
1 AEROSOL, METERED RESPIRATORY (INHALATION) 2 TIMES DAILY
Qty: 1 INHALER | Refills: 6 | Status: SHIPPED | OUTPATIENT
Start: 2018-03-30 | End: 2019-09-12 | Stop reason: ALTCHOICE

## 2018-05-23 ENCOUNTER — OFFICE VISIT (OUTPATIENT)
Dept: FAMILY MEDICINE CLINIC | Facility: CLINIC | Age: 68
End: 2018-05-23
Payer: COMMERCIAL

## 2018-05-23 VITALS
TEMPERATURE: 98.1 F | WEIGHT: 178 LBS | HEIGHT: 70 IN | OXYGEN SATURATION: 97 % | HEART RATE: 74 BPM | RESPIRATION RATE: 20 BRPM | SYSTOLIC BLOOD PRESSURE: 118 MMHG | BODY MASS INDEX: 25.48 KG/M2 | DIASTOLIC BLOOD PRESSURE: 78 MMHG

## 2018-05-23 DIAGNOSIS — R05.9 COUGH: ICD-10-CM

## 2018-05-23 DIAGNOSIS — R06.00 DYSPNEA, UNSPECIFIED TYPE: ICD-10-CM

## 2018-05-23 DIAGNOSIS — J45.909 UNCOMPLICATED ASTHMA, UNSPECIFIED ASTHMA SEVERITY, UNSPECIFIED WHETHER PERSISTENT: ICD-10-CM

## 2018-05-23 DIAGNOSIS — J40 BRONCHITIS: Primary | ICD-10-CM

## 2018-05-23 PROCEDURE — 99214 OFFICE O/P EST MOD 30 MIN: CPT | Performed by: NURSE PRACTITIONER

## 2018-05-23 RX ORDER — AZITHROMYCIN 250 MG/1
TABLET, FILM COATED ORAL
Qty: 6 TABLET | Refills: 0 | Status: SHIPPED | OUTPATIENT
Start: 2018-05-23 | End: 2018-05-27

## 2018-05-23 RX ORDER — PREDNISONE 20 MG/1
20 TABLET ORAL DAILY
Qty: 5 TABLET | Refills: 0 | Status: SHIPPED | OUTPATIENT
Start: 2018-05-23 | End: 2019-09-12 | Stop reason: ALTCHOICE

## 2018-05-23 NOTE — PROGRESS NOTES
Assessment/Plan:    1  Bronchitis  - azithromycin (ZITHROMAX) 250 mg tablet; Take 2 tablets today then 1 tablet daily x 4 days  Dispense: 6 tablet; Refill: 0  - predniSONE 20 mg tablet; Take 1 tablet (20 mg total) by mouth daily  Dispense: 5 tablet; Refill: 0  2  Dyspnea, unspecified type  Pt with history of asthma  Not sure if any copd  Agree with pulmonary eval  - predniSONE 20 mg tablet; Take 1 tablet (20 mg total) by mouth daily  Dispense: 5 tablet; Refill: 0  3  Cough  Will treat bronchitis and will start on inhaled steroid until able to see pulmonary  4  Uncomplicated asthma, unspecified asthma severity, unspecified whether persistent  - fluticasone-salmeterol (ADVAIR) 250-50 mcg/dose inhaler; Inhale 1 puff every 12 (twelve) hours  Dispense: 1 Inhaler; Refill: 0           Subjective:      Patient ID: Aleida Hampton is a 74 y o  female who presents for cough    Symptoms for a couple of months  Cough, chest feels tight  SOB  Ears feel clogged  Phlegm production, worse in am  Breathing getting progressively worse and having coughing fits  Has appt with pulm 6/20/18  Does not know name of pumonologist    CXR 1/18, no active pulmonary disease  No fevers  Using rescue inhaler at least twice a day, sometimes more  Former smoker, quit greater than 10 years ago  Was started on Singulair in march  "didn't help"  Stopped taking  History of asthma since childhood  The following portions of the patient's history were reviewed and updated as appropriate: allergies, current medications, past family history, past medical history, past social history, past surgical history and problem list     Review of Systems   Constitutional: Negative for chills, diaphoresis, fatigue and fever  HENT: Negative for congestion, sinus pain and sinus pressure  Respiratory: Positive for cough, chest tightness, shortness of breath and wheezing  Cardiovascular: Negative for chest pain, palpitations and leg swelling  Gastrointestinal: Negative for abdominal pain, diarrhea, nausea and vomiting  Musculoskeletal: Negative for back pain and myalgias  Skin: Negative for color change and pallor  Neurological: Negative for dizziness, weakness and headaches  Hematological: Negative for adenopathy  Objective:      /78 (BP Location: Left arm, Patient Position: Sitting, Cuff Size: Standard)   Pulse 74   Temp 98 1 °F (36 7 °C) (Temporal)   Resp 20   Ht 5' 10" (1 778 m)   Wt 80 7 kg (178 lb)   SpO2 97%   BMI 25 54 kg/m²          Physical Exam   Constitutional: She is oriented to person, place, and time  She appears well-developed and well-nourished  HENT:   TMS WNL  Turbinates pale  Oropharynx with no erythema or exudate  No sinus tenderness to palpation  (-) PND     Cardiovascular: Normal rate, regular rhythm and normal heart sounds  Pulmonary/Chest: She is in respiratory distress (mild conversational dyspnea)  She has no wheezes  She has no rales  Slightly decreased breath sounds throughout  Mild conversational dyspnea  Pulse ox 97% on room air  Neurological: She is alert and oriented to person, place, and time  Skin: Skin is warm and dry  Psychiatric: She has a normal mood and affect  None

## 2018-05-23 NOTE — PATIENT INSTRUCTIONS
Zithromax 250mg - take 2 tablets today and then 1 tablet daily for 4 days  Rescue inhaler , 2 puffs every 4-6 hours as needed for shortness breath, chest tightness, bronchospasm, coughing fits  Prednisone 20mg daily with food for 5 days  Advair 1 puff twice daily  Consult with pulmonary as scheduled

## 2018-05-24 ENCOUNTER — TELEPHONE (OUTPATIENT)
Dept: FAMILY MEDICINE CLINIC | Facility: CLINIC | Age: 68
End: 2018-05-24

## 2018-05-24 NOTE — TELEPHONE ENCOUNTER
Dolores Bence:    Patient called to let you know that whatever you prescribed for her yesterday made her feel so much better! She wanted to thank you very much for listening to her    She was very pleased with you!!!!

## 2018-06-20 ENCOUNTER — OFFICE VISIT (OUTPATIENT)
Dept: PULMONOLOGY | Facility: MEDICAL CENTER | Age: 68
End: 2018-06-20
Payer: COMMERCIAL

## 2018-06-20 VITALS
BODY MASS INDEX: 27.25 KG/M2 | HEART RATE: 83 BPM | OXYGEN SATURATION: 99 % | HEIGHT: 69 IN | TEMPERATURE: 97.9 F | RESPIRATION RATE: 12 BRPM | WEIGHT: 184 LBS | DIASTOLIC BLOOD PRESSURE: 64 MMHG | SYSTOLIC BLOOD PRESSURE: 112 MMHG

## 2018-06-20 DIAGNOSIS — J30.1 SEASONAL ALLERGIC RHINITIS DUE TO POLLEN: ICD-10-CM

## 2018-06-20 DIAGNOSIS — J45.30 MILD PERSISTENT ASTHMA WITHOUT COMPLICATION: ICD-10-CM

## 2018-06-20 DIAGNOSIS — R06.02 SOB (SHORTNESS OF BREATH): Primary | ICD-10-CM

## 2018-06-20 PROCEDURE — 94010 BREATHING CAPACITY TEST: CPT | Performed by: NURSE PRACTITIONER

## 2018-06-20 PROCEDURE — 99204 OFFICE O/P NEW MOD 45 MIN: CPT | Performed by: NURSE PRACTITIONER

## 2018-06-20 RX ORDER — FLUTICASONE FUROATE AND VILANTEROL 100; 25 UG/1; UG/1
1 POWDER RESPIRATORY (INHALATION) DAILY
Qty: 1 INHALER | Refills: 0 | Status: SHIPPED | OUTPATIENT
Start: 2018-06-20 | End: 2018-06-22 | Stop reason: SDUPTHER

## 2018-06-20 NOTE — ASSESSMENT & PLAN NOTE
Chica Hooks has had frequent sinus infections and allergies  He has not gone to an allergist but has seen an ENT in the past   She currently has some nasal discharge in also nasal stuffiness  I do not suspect sinusitis but would suggest that she restart her fluticasone nasal spray  As she is has a long history of sinus problems ear nose and throat referral seems appropriate at this time

## 2018-06-20 NOTE — ASSESSMENT & PLAN NOTE
Dari Oewn has mild intermittent asthma  She was treated for a bronchitis in May of 2018 and is doing well  She had PFT done today that has somewhat erroneously results  She has forced vital capacity of 2 89 L or 75% of predicted, FEV1 is 1 90 L or 65% of predicted obstruction ratio 66%  Flow volume loop shows moderate airway obstruction  Dari Owen is a former smoker and could have an element of COPD  She is mild asthma and that she needs rarely needs rescue inhalation except when ill  Plan includes continuation evident inhaled corticosteroid  As she has used Flovent and recently Advair in the past I will order for her Breo 100 mcg 1 puff daily  She will likely need this for most of the year as it appears she has seasonal allergy as well

## 2018-06-20 NOTE — PATIENT INSTRUCTIONS
You have mild asthma  Pulmonary function test today showed moderate obstructive defect  This could be from both asthma as well as past medical history of smoking  You also  Appear to have seasonal allergies  I would continue with an inhaled corticosteroid  I gave you Breo  100 mcg to be used once daily 1 puff  Please monitor year peak flow  Estimated peak plug flow is between 380-400  If you become short of breath that is persistent or worsening in spite of using rescue inhalation contact primary care physician or this office

## 2018-06-20 NOTE — PROGRESS NOTES
Assessment/Plan:       Problem List Items Addressed This Visit        Respiratory    Asthma     Alyx Chawla has mild intermittent asthma  She was treated for a bronchitis in May of 2018 and is doing well  She had PFT done today that has somewhat erroneously results  She has forced vital capacity of 2 89 L or 75% of predicted, FEV1 is 1 90 L or 65% of predicted obstruction ratio 66%  Flow volume loop shows moderate airway obstruction  Alyx Chawla is a former smoker and could have an element of COPD  She is mild asthma and that she needs rarely needs rescue inhalation except when ill  Plan includes continuation evident inhaled corticosteroid  As she has used Flovent and recently Advair in the past I will order for her Breo 100 mcg 1 puff daily  She will likely need this for most of the year as it appears she has seasonal allergy as well  Relevant Medications    fluticasone-vilanterol (BREO ELLIPTA) 100-25 mcg/inh inhaler    Seasonal allergic rhinitis due to pollen     Alyx Chawla has had frequent sinus infections and allergies  He has not gone to an allergist but has seen an ENT in the past   She currently has some nasal discharge in also nasal stuffiness  I do not suspect sinusitis but would suggest that she restart her fluticasone nasal spray  As she is has a long history of sinus problems ear nose and throat referral seems appropriate at this time  Other Visit Diagnoses     SOB (shortness of breath)    -  Primary    Relevant Orders    POCT spirometry (Completed)            Return in about 1 year (around 6/20/2019)  All questions are answered to the patient's satisfaction and understanding  She verbalizes understanding  She is encouraged to call with any further questions or concerns  Portions of the record may have been created with voice recognition software  Occasional wrong word or "sound a like" substitutions may have occurred due to the inherent limitations of voice recognition software  Read the chart carefully and recognize, using context, where substitutions have occurred  Electronically Signed by CHANO Lake    ______________________________________________________________________    Chief Complaint:   Chief Complaint   Patient presents with    Consult     sob  pt was seen for sinus infection and given meds by pcp  a couple of months ago   Shortness of Breath     not good since sinus infection    Cough     started last night possible allergies        Patient ID: Barrington Cushing is a 74 y o  y o  female has a past medical history of Abnormal inflammatory bowel disease panel; Asthma; Chest wall contusion; Dermatomycosis (08/02/2011); Hordeolum internum of left eye; oral aphthous ulcers (08/22/2011); Hypercalcemia (01/11/2011); Hyperlipidemia; Hypertension; Inflammatory bowel disease; Sciatica (05/02/2008); and Ulceration of intestine  6/20/2018  Safia is a 72-year-old female who was recently treated for bronchitis  She was seen by primary care provider Esperanza Kaur on May 28, 2018  She was treated with azithromycin as well as tapering dose of prednisone and was also given Advair 250/51 puff q 12 hours  She used twice per day for approximately 10 days and is currently using it once daily  In the past she has used Flovent 110 mcg once daily  At 1 point she was using only about 3 or 4 times per week  She had not been needing rescue inhalation  However she was seen in March 2018 and had asthma exacerbation  She was given Flonase as well as Singulair  Chief complaint was cough patient also has history of chronic sinus infection  She also has had chest tightness in the past   Kate Tony reports being hospitalized at least 13 years ago at [facility]   Likely this was for an asthma exacerbation  It is also noted that she is a former smoker  She smoked for 20+ years, 2 packs per day and quit in 13 years ago    Patient currently is feeling much improved since her treatment from 2 weeks ago   She is continuing to use her Advair 74834 only once daily and has not needed rescue inhalation  However on she does note that she is feeling more nasal congestion since last evening and has a slight cough  She is not certain what exacerbated this new symptom but feels that this could be related to allergy  Shortness of Breath   This is a chronic problem  The current episode started more than 1 year ago  The problem occurs daily  The problem has been gradually improving  Associated symptoms include sputum production  The symptoms are aggravated by exercise and pollens  The patient has no known risk factors for DVT/PE  She has tried beta agonist inhalers, steroid inhalers and leukotriene antagonists for the symptoms  The treatment provided mild relief  Her past medical history is significant for asthma  Cough   Associated symptoms include postnasal drip and shortness of breath  Her past medical history is significant for asthma  Occupational/Exposure history:  Travel history:  Review of Systems   Constitutional: Negative  HENT: Positive for postnasal drip  Respiratory: Positive for cough, sputum production and shortness of breath  Cardiovascular: Negative  Gastrointestinal: Negative  Endocrine: Negative  Genitourinary: Negative  Musculoskeletal: Negative  Skin: Negative  Allergic/Immunologic: Negative  Neurological: Negative  Hematological: Negative  Psychiatric/Behavioral: Negative  Social history: She reports that she quit smoking about 13 years ago  She has a 40 00 pack-year smoking history  She has never used smokeless tobacco  She reports that she drinks alcohol  She reports that she does not use drugs      Past surgical history:   Past Surgical History:   Procedure Laterality Date    MYRINGOTOMY Bilateral      Family history:   Family History   Problem Relation Age of Onset    No Known Problems Mother         macular degeneration per allscripts    No Known Problems Father        Immunization History   Administered Date(s) Administered    Influenza Quadrivalent Preservative Free 3 years and older IM 02/16/2017     Current Outpatient Prescriptions   Medication Sig Dispense Refill    albuterol (PROVENTIL HFA,VENTOLIN HFA) 90 mcg/act inhaler Inhale 2 puffs every 4 (four) hours as needed for wheezing      fluticasone-salmeterol (ADVAIR) 250-50 mcg/dose inhaler Inhale 1 puff every 12 (twelve) hours 1 Inhaler 0    rosuvastatin (CRESTOR) 10 MG tablet Take 10 mg by mouth daily      valsartan (DIOVAN) 80 mg tablet Take 80 mg by mouth daily      fluticasone (FLONASE) 50 mcg/act nasal spray 1 spray into each nostril daily 16 g 0    fluticasone (FLOVENT HFA) 220 mcg/act inhaler Inhale 1 puff 2 (two) times a day 1 Inhaler 6    fluticasone-vilanterol (BREO ELLIPTA) 100-25 mcg/inh inhaler Inhale 1 puff daily Rinse mouth after use  1 Inhaler 0    montelukast (SINGULAIR) 10 mg tablet Take 1 tablet (10 mg total) by mouth daily at bedtime 30 tablet 3    predniSONE 20 mg tablet Take 1 tablet (20 mg total) by mouth daily 5 tablet 0     No current facility-administered medications for this visit  Allergies: Patient has no known allergies  Objective:  Vitals:    06/20/18 1044   BP: 112/64   BP Location: Left arm   Patient Position: Sitting   Cuff Size: Standard   Pulse: 83   Resp: 12   Temp: 97 9 °F (36 6 °C)   TempSrc: Oral   SpO2: 99%   Weight: 83 5 kg (184 lb)   Height: 5' 9 25" (1 759 m)   Oxygen Therapy  SpO2: 99 %    Wt Readings from Last 3 Encounters:   06/20/18 83 5 kg (184 lb)   05/23/18 80 7 kg (178 lb)   03/29/18 83 4 kg (183 lb 12 8 oz)     Body mass index is 26 98 kg/m²  Physical Exam   Constitutional: She is oriented to person, place, and time  She appears well-developed and well-nourished  HENT:   Head: Normocephalic and atraumatic  Mallampati 4   Eyes: Conjunctivae are normal  Pupils are equal, round, and reactive to light     Neck: Normal range of motion  Cardiovascular: Normal rate and regular rhythm  Pulmonary/Chest: Effort normal and breath sounds normal    Abdominal: Soft  Musculoskeletal: Normal range of motion  Neurological: She is alert and oriented to person, place, and time  She has normal reflexes  Skin: Skin is warm and dry  Psychiatric: She has a normal mood and affect  Her behavior is normal  Thought content normal        Lab Review:   No visits with results within 6 Month(s) from this visit  Latest known visit with results is:   Generic Conversion - Encounter on 03/15/2017   Component Date Value    INTERPRETATION 03/15/2017 Note        Diagnostics:  I have personally reviewed pertinent reports  Office Spirometry Results:  FEV1: 1 9 liters (65%)  FVC: 2 89 liters (75%)  FEV1/FVC: 65 7 %  ESS:    No results found

## 2018-06-22 DIAGNOSIS — J45.30 MILD PERSISTENT ASTHMA WITHOUT COMPLICATION: ICD-10-CM

## 2018-06-22 RX ORDER — FLUTICASONE FUROATE AND VILANTEROL 100; 25 UG/1; UG/1
1 POWDER RESPIRATORY (INHALATION) DAILY
Qty: 3 INHALER | Refills: 3 | Status: SHIPPED | OUTPATIENT
Start: 2018-06-22 | End: 2018-07-13 | Stop reason: SDUPTHER

## 2018-07-05 ENCOUNTER — OFFICE VISIT (OUTPATIENT)
Dept: OTOLARYNGOLOGY | Facility: CLINIC | Age: 68
End: 2018-07-05
Payer: COMMERCIAL

## 2018-07-05 VITALS
WEIGHT: 184.8 LBS | HEIGHT: 70 IN | BODY MASS INDEX: 26.45 KG/M2 | SYSTOLIC BLOOD PRESSURE: 112 MMHG | HEART RATE: 92 BPM | DIASTOLIC BLOOD PRESSURE: 68 MMHG

## 2018-07-05 DIAGNOSIS — J33.9 NASAL POLYPOSIS: ICD-10-CM

## 2018-07-05 DIAGNOSIS — H91.92 HEARING LOSS OF LEFT EAR, UNSPECIFIED HEARING LOSS TYPE: Primary | ICD-10-CM

## 2018-07-05 DIAGNOSIS — H69.81 ETD (EUSTACHIAN TUBE DYSFUNCTION), RIGHT: ICD-10-CM

## 2018-07-05 PROCEDURE — 99203 OFFICE O/P NEW LOW 30 MIN: CPT | Performed by: OTOLARYNGOLOGY

## 2018-07-05 RX ORDER — DIPHENOXYLATE HYDROCHLORIDE AND ATROPINE SULFATE 2.5; .025 MG/1; MG/1
1 TABLET ORAL DAILY
COMMUNITY

## 2018-07-05 RX ORDER — ASCORBIC ACID 500 MG
500 TABLET ORAL 2 TIMES DAILY
COMMUNITY
End: 2022-04-11

## 2018-07-05 NOTE — LETTER
July 5, 2018     [place], [address] [facility]    Patient: John Ryan   YOB: 1950   Date of Visit: 7/5/2018       Dear Dr Paty Andres: Thank you for referring Rakel Harris to me for evaluation  Below are my notes for this consultation  If you have questions, please do not hesitate to call me  I look forward to following your patient along with you  Sincerely,        Holly Pierre MD        CC: No Recipients  Holly Pierre MD  7/5/2018 11:21 AM  Sign at close encounter  [facility] New Patient Visit    John Ryan is a 74 y o  who presents with a chief complaint of hearing loss    Pertinent elements of the history include:  She presents with persistent right sided hearing loss and has had 3 sets of right sided ear tubes over the course of many years  Over the winter, she believes she developed a sinus infection and has since suffered from diminished left sided hearing as well  Of note, she has associated asthma  She has some restricted nasal breathing and wears a breathe [employer] strip at night, which helps  She does have some seasonal allergies, especially in the Fall  She cannot tolerate antihistamines well -- fatigue, brain fog  She denies otalgia, dizziness, vertigo, otorrhea  Some difficulty with pressure equalization with flying but "not a big deal "    Her last set of ear tubes was years ago  She has been using Flonase for the past 10 days  Review of systems 10 point review of systems reviewed as documented in the intake form, scanned into the medical record under the media tab  Results reviewed; images from any scan have been personally reviewed: The past medical, surgical, social and family history have been reviewed as documented in today's record      Physical exam: (abnormal findings appear in bold and supercede any conflicting normal findings listed below)    /68 (BP Location: Right arm, Patient Position: Sitting, Cuff Size: Standard)   Pulse 92   Ht 5' 10" (1 778 m)   Wt 83 8 kg (184 lb 12 8 oz)   BMI 26 52 kg/m²      Constitutional:  Well developed, well nourished and groomed, in no acute distress  Eyes:  Extra-ocular movements intact, pupils equally round and reactive to light and accommodation, the lids and conjunctivae are normal in appearance  Head: Atraumatic, normocephalic, no visible scalp lesions, bony palpation unremarkable without stepoffs, parotid and submandibular salivary glands non-tender to palpation and without masses bilaterally  Ears:  Auricles normal in appearance bilaterally, mastoid prominence non-tender, external auditory canals clear bilaterally, tympanic membranes intact bilaterally without evidence of middle ear effusion or masses, normal appearing ossicles  Right TM tympanosclerosis with monomeric membrane  Left TM retraction  No pockets or effusion  Nose/Sinuses:  External appearance unremarkable, no maxillary or frontal sinus tenderness to palpation bilaterally  Anterior rhinoscopy reveals: possible right middle meatal polyposis  biltaeral mucosal edema     Oral Cavity:  Moist mucus membranes, gums and dentition unremarkable, no oral mucosal masses or lesions, floor of mouth soft, tongue mobile without masses or lesions  Oropharynx:  Base of tongue soft and without masses, tonsils bilaterally unremarkable, soft palate mucosa unremarkable, laryngeal mirror exam unrevealing  Neck:  No visible or palpable cervical lesions or lymphadenopathy, thyroid gland is normal in size and symmetry and without masses, normal laryngeal elevation with swallowing  Cardiovascular:  Normal rate and rhythm, no palpable thrills, no jugulovenous distension observed  Respiratory:  Normal respiratory effort without evidence of retractions or use of accessory muscles  Integument:  Normal appearing without observed masses or lesions    Neurologic:  Cranial nerves II-XII intact bilaterally  Psychiatric:  Alert and oriented to time, place and person, normal affect  Procedures  The nasal cavities were decongested with lidocaine and oxymetazoline spray  Bilateral nasal endoscopy was performed as follows: Location: bilateral nasal and nasopharyngeal endoscopy  Endoscopy type: flexible endoscopy  Results: polypoid changes of the right middle turbinate with right greater than left sided ET orifice edema and some erythema  Normal appearing adenoid bed  The patient tolerated the procedure well  Assessment:   1  Hearing loss of left ear, unspecified hearing loss type  Audiogram screen   2  ETD (Eustachian tube dysfunction), right     3  Nasal polyposis         Orders  Orders Placed This Encounter   Procedures    Audiogram screen     Standing Status:   Future     Standing Expiration Date:   7/5/2019         Discussion/Plan:    1  We performed a nasal endoscopy today which demonstrates evidence of mucosal inflammation especially on the right side  There are no physical obstructions of the eustachian tube orifice  She just started Flonase and I recommended continuation of this medication  2   Follow up next week for an audiogram to determine if there is any associated hearing loss to go along with her right greater than left-sided eustachian tube dysfunction  Thank you for allowing me to participate in the care of your patient

## 2018-07-05 NOTE — PROGRESS NOTES
[facility] Patient Visit    Aleida Hampton is a 74 y o  who presents with a chief complaint of hearing loss    Pertinent elements of the history include:  She presents with persistent right sided hearing loss and has had 3 sets of right sided ear tubes over the course of many years  Over the winter, she believes she developed a sinus infection and has since suffered from diminished left sided hearing as well  Of note, she has associated asthma  She has some restricted nasal breathing and wears a breathe rite strip at night, which helps  She does have some seasonal allergies, especially in the Fall  She cannot tolerate antihistamines well -- fatigue, brain fog  She denies otalgia, dizziness, vertigo, otorrhea  Some difficulty with pressure equalization with flying but "not a big deal "    Her last set of ear tubes was years ago  She has been using Flonase for the past 10 days  Review of systems 10 point review of systems reviewed as documented in the intake form, scanned into the medical record under the media tab  Results reviewed; images from any scan have been personally reviewed: The past medical, surgical, social and family history have been reviewed as documented in today's record  Physical exam: (abnormal findings appear in bold and supercede any conflicting normal findings listed below)    /68 (BP Location: Right arm, Patient Position: Sitting, Cuff Size: Standard)   Pulse 92   Ht 5' 10" (1 778 m)   Wt 83 8 kg (184 lb 12 8 oz)   BMI 26 52 kg/m²     Constitutional:  Well developed, well nourished and groomed, in no acute distress  Eyes:  Extra-ocular movements intact, pupils equally round and reactive to light and accommodation, the lids and conjunctivae are normal in appearance      Head: Atraumatic, normocephalic, no visible scalp lesions, bony palpation unremarkable without stepoffs, parotid and submandibular salivary glands non-tender to palpation and without masses bilaterally  Ears:  Auricles normal in appearance bilaterally, mastoid prominence non-tender, external auditory canals clear bilaterally, tympanic membranes intact bilaterally without evidence of middle ear effusion or masses, normal appearing ossicles  Right TM tympanosclerosis with monomeric membrane  Left TM retraction  No pockets or effusion  Nose/Sinuses:  External appearance unremarkable, no maxillary or frontal sinus tenderness to palpation bilaterally  Anterior rhinoscopy reveals: possible right middle meatal polyposis  biltaeral mucosal edema     Oral Cavity:  Moist mucus membranes, gums and dentition unremarkable, no oral mucosal masses or lesions, floor of mouth soft, tongue mobile without masses or lesions  Oropharynx:  Base of tongue soft and without masses, tonsils bilaterally unremarkable, soft palate mucosa unremarkable, laryngeal mirror exam unrevealing  Neck:  No visible or palpable cervical lesions or lymphadenopathy, thyroid gland is normal in size and symmetry and without masses, normal laryngeal elevation with swallowing  Cardiovascular:  Normal rate and rhythm, no palpable thrills, no jugulovenous distension observed  Respiratory:  Normal respiratory effort without evidence of retractions or use of accessory muscles  Integument:  Normal appearing without observed masses or lesions  Neurologic:  Cranial nerves II-XII intact bilaterally  Psychiatric:  Alert and oriented to time, place and person, normal affect  Procedures  The nasal cavities were decongested with lidocaine and oxymetazoline spray  Bilateral nasal endoscopy was performed as follows: Location: bilateral nasal and nasopharyngeal endoscopy  Endoscopy type: flexible endoscopy  Results: polypoid changes of the right middle turbinate with right greater than left sided ET orifice edema and some erythema  Normal appearing adenoid bed      The patient tolerated the procedure well       Assessment:   1  Hearing loss of left ear, unspecified hearing loss type  Audiogram screen   2  ETD (Eustachian tube dysfunction), right     3  Nasal polyposis         Orders  Orders Placed This Encounter   Procedures    Audiogram screen     Standing Status:   Future     Standing Expiration Date:   7/5/2019         Discussion/Plan:    1  We performed a nasal endoscopy today which demonstrates evidence of mucosal inflammation especially on the right side  There are no physical obstructions of the eustachian tube orifice  She just started Flonase and I recommended continuation of this medication  2   Follow up next week for an audiogram to determine if there is any associated hearing loss to go along with her right greater than left-sided eustachian tube dysfunction  Thank you for allowing me to participate in the care of your patient

## 2018-07-13 DIAGNOSIS — J45.30 MILD PERSISTENT ASTHMA WITHOUT COMPLICATION: ICD-10-CM

## 2018-07-16 RX ORDER — FLUTICASONE FUROATE AND VILANTEROL 100; 25 UG/1; UG/1
1 POWDER RESPIRATORY (INHALATION) DAILY
Qty: 3 INHALER | Refills: 0 | Status: SHIPPED | OUTPATIENT
Start: 2018-07-16 | End: 2018-09-08 | Stop reason: SDUPTHER

## 2018-07-18 ENCOUNTER — TELEPHONE (OUTPATIENT)
Dept: FAMILY MEDICINE CLINIC | Facility: CLINIC | Age: 68
End: 2018-07-18

## 2018-07-18 DIAGNOSIS — I10 BENIGN ESSENTIAL HYPERTENSION: Primary | ICD-10-CM

## 2018-07-18 RX ORDER — LOSARTAN POTASSIUM 50 MG/1
25 TABLET ORAL DAILY
Qty: 90 TABLET | Refills: 2 | Status: SHIPPED | OUTPATIENT
Start: 2018-07-18 | End: 2018-07-19 | Stop reason: SDUPTHER

## 2018-07-18 NOTE — TELEPHONE ENCOUNTER
Dr Edda Pires,  Please send an alternative to valsartan to quick check  She confirmed with [facility] that hers was recalled    She is leaving for vacation on Saturday

## 2018-07-19 ENCOUNTER — TELEPHONE (OUTPATIENT)
Dept: FAMILY MEDICINE CLINIC | Facility: CLINIC | Age: 68
End: 2018-07-19

## 2018-07-19 DIAGNOSIS — I10 BENIGN ESSENTIAL HYPERTENSION: ICD-10-CM

## 2018-07-19 RX ORDER — LOSARTAN POTASSIUM 50 MG/1
50 TABLET ORAL DAILY
Qty: 90 TABLET | Refills: 0 | Status: SHIPPED | OUTPATIENT
Start: 2018-07-19 | End: 2018-08-16 | Stop reason: SDUPTHER

## 2018-07-19 RX ORDER — LOSARTAN POTASSIUM 50 MG/1
50 TABLET ORAL DAILY
Qty: 30 TABLET | Refills: 0 | Status: SHIPPED | OUTPATIENT
Start: 2018-07-19 | End: 2018-07-19 | Stop reason: SDUPTHER

## 2018-07-19 NOTE — TELEPHONE ENCOUNTER
Dr Juan Montenegro,     Patient said that you just called her medication losartan in to the home delivery pharmacy but she leaves at 6 am for a trip and she needs it called in to the [address] [place] [address] in [place]  Thank you

## 2018-08-16 ENCOUNTER — OFFICE VISIT (OUTPATIENT)
Dept: OTOLARYNGOLOGY | Facility: CLINIC | Age: 68
End: 2018-08-16

## 2018-08-16 ENCOUNTER — OFFICE VISIT (OUTPATIENT)
Dept: AUDIOLOGY | Facility: CLINIC | Age: 68
End: 2018-08-16
Payer: COMMERCIAL

## 2018-08-16 DIAGNOSIS — H69.83 DYSFUNCTION OF BOTH EUSTACHIAN TUBES: Primary | ICD-10-CM

## 2018-08-16 DIAGNOSIS — H90.0 CONDUCTIVE HEARING LOSS, BILATERAL: Primary | ICD-10-CM

## 2018-08-16 DIAGNOSIS — I10 BENIGN ESSENTIAL HYPERTENSION: ICD-10-CM

## 2018-08-16 PROCEDURE — 92557 COMPREHENSIVE HEARING TEST: CPT | Performed by: AUDIOLOGIST

## 2018-08-16 PROCEDURE — 92567 TYMPANOMETRY: CPT | Performed by: AUDIOLOGIST

## 2018-08-16 PROCEDURE — 99024 POSTOP FOLLOW-UP VISIT: CPT | Performed by: OTOLARYNGOLOGY

## 2018-08-16 RX ORDER — LOSARTAN POTASSIUM 50 MG/1
50 TABLET ORAL DAILY
Qty: 90 TABLET | Refills: 0 | Status: SHIPPED | OUTPATIENT
Start: 2018-08-16 | End: 2019-02-04 | Stop reason: SDUPTHER

## 2018-08-16 NOTE — PROGRESS NOTES
Arnoldo Lomeli returns to review her audiogram which demonstrates a right greater than left-sided conductive hearing loss due to type B tympanogram and chronic middle ear effusion on the right side  She would benefit from replacement of a right myringotomy and tube  We also discussed balloon dilation of her eustachian tubes  She will consider this but for now we'll follow up on Monday for ear tube placement in our other office because we do not have the equipment available in this office to perform today  No charge for today's visit

## 2018-08-16 NOTE — PROGRESS NOTES
HEARING EVALUATION    Name:  Ton Gerard  :  1950  Age:  74 y o  Date of Evaluation: 18     History: recent decrease hearing left ear, chronic hearing issues right ear with previous PE tube placement history   Reason for visit: Ton Gerard is being seen today at the request of Dr Em Arce for an evaluation of hearing  Patient reports recent medical treatment for left hearing/ear issues with primary care office  Since her first visit with ENT, she feels her left hearing has improved  EVALUATION:    Otoscopic Evaluation:   Right Ear: Tympanosclerosis noted / canal clear of cerumen    Left Ear: clear canal    Tympanometry:   Right: Type B - middle ear disorder suspected    Left: Type C - negative pressure    Audiogram Results:    *see attached audiogram    RECOMMENDATIONS:  1  Follow-up per Dr Brianna Rodriguez:   Discussed results and recommendations with Ms Moncada  Questions were addressed and the patient was encouraged to contact our department should concerns arise      Maeve Walker , CCC-A, [place]# [id number], Hearing Aid Dispenser, [place]# [id number]  Clinical Audiologist

## 2018-08-16 NOTE — TELEPHONE ENCOUNTER
Dr Harjinder Baker,  Please resend losartan to [facility] as they only received order for 25mg, not 50mg  Thank you!

## 2018-08-23 ENCOUNTER — OFFICE VISIT (OUTPATIENT)
Dept: OTOLARYNGOLOGY | Facility: CLINIC | Age: 68
End: 2018-08-23
Payer: COMMERCIAL

## 2018-08-23 VITALS
WEIGHT: 187.4 LBS | DIASTOLIC BLOOD PRESSURE: 88 MMHG | BODY MASS INDEX: 26.83 KG/M2 | HEIGHT: 70 IN | SYSTOLIC BLOOD PRESSURE: 128 MMHG

## 2018-08-23 DIAGNOSIS — H69.81 ETD (EUSTACHIAN TUBE DYSFUNCTION), RIGHT: Primary | ICD-10-CM

## 2018-08-23 PROCEDURE — 99213 OFFICE O/P EST LOW 20 MIN: CPT | Performed by: OTOLARYNGOLOGY

## 2018-08-23 NOTE — PROGRESS NOTES
[facility] Otolaryngology Follow up visit      CC: Right ear pain      Time interval of problem since last visit:  2 days    Pertinent interval elements of the history:  She is status post placement of right myringotomy tube in our other office 2 days ago  Since then she has experienced throbbing right ear pain  This has been partially helped with Tylenol  Her symptoms persist and are of moderate intensity throughout the day  She does note improved hearing in this ear as a result of the year 2  She has had no otorrhea  No other complaints  Review of any relevant imaging:      Interval Review of systems:  General: no weight change, normal energy  CV: no palpitations or chest pain  Pulm: no shortness of breath    Interval Social History:  Social History     Social History    Marital status: /Civil Union     Spouse name: N/A    Number of children: N/A    Years of education: N/A     Occupational History    Not on file  Social History Main Topics    Smoking status: Former Smoker     Packs/day: 2 00     Years: 20 00     Quit date: 6/20/2005    Smokeless tobacco: Never Used    Alcohol use Yes      Comment: occasional     Drug use: No    Sexual activity: Not on file     Other Topics Concern    Not on file     Social History Narrative    No narrative on file       Interval Physical Examination:  /88 (BP Location: Left arm, Patient Position: Sitting, Cuff Size: Adult)   Ht 5' 10" (1 778 m)   Wt 85 kg (187 lb 6 4 oz)   BMI 26 89 kg/m²   NAD  AD: tube in place and patent  There is some ecchymoses around the incision site on the drum  No evidence of middle ear effusion or infection  No bleeding or crusting  Interval endoscopy:          Assessment:  1  ETD (Eustachian tube dysfunction), right         Plan:  1  She has had some residual ureter pain likely due to eardrum swelling following tube placement  I recommended continued use of analgesics as needed    This should resolve on its on over the next week  Follow-up was scheduled

## 2018-09-08 DIAGNOSIS — J45.30 MILD PERSISTENT ASTHMA WITHOUT COMPLICATION: ICD-10-CM

## 2018-09-19 RX ORDER — FLUTICASONE FUROATE AND VILANTEROL TRIFENATATE 100; 25 UG/1; UG/1
1 POWDER RESPIRATORY (INHALATION) DAILY
Qty: 1 INHALER | Refills: 5 | Status: SHIPPED | OUTPATIENT
Start: 2018-09-19 | End: 2019-10-25 | Stop reason: SDUPTHER

## 2018-11-11 ENCOUNTER — HOSPITAL ENCOUNTER (EMERGENCY)
Facility: HOSPITAL | Age: 68
Discharge: HOME | End: 2018-11-11
Attending: EMERGENCY MEDICINE
Payer: COMMERCIAL

## 2018-11-11 ENCOUNTER — OFFICE VISIT (OUTPATIENT)
Dept: URGENT CARE | Facility: CLINIC | Age: 68
End: 2018-11-11
Payer: COMMERCIAL

## 2018-11-11 VITALS
BODY MASS INDEX: 27.9 KG/M2 | DIASTOLIC BLOOD PRESSURE: 78 MMHG | OXYGEN SATURATION: 98 % | SYSTOLIC BLOOD PRESSURE: 177 MMHG | TEMPERATURE: 97.2 F | HEART RATE: 67 BPM | WEIGHT: 188.93 LBS | RESPIRATION RATE: 18 BRPM

## 2018-11-11 VITALS
BODY MASS INDEX: 27.99 KG/M2 | OXYGEN SATURATION: 98 % | SYSTOLIC BLOOD PRESSURE: 140 MMHG | HEIGHT: 69 IN | TEMPERATURE: 99.4 F | DIASTOLIC BLOOD PRESSURE: 78 MMHG | HEART RATE: 73 BPM | WEIGHT: 189 LBS | RESPIRATION RATE: 16 BRPM

## 2018-11-11 DIAGNOSIS — H92.21 EAR BLEEDING, RIGHT: Primary | ICD-10-CM

## 2018-11-11 DIAGNOSIS — H92.21 BLOOD IN EAR CANAL, RIGHT: Primary | ICD-10-CM

## 2018-11-11 PROCEDURE — 99213 OFFICE O/P EST LOW 20 MIN: CPT | Performed by: FAMILY MEDICINE

## 2018-11-11 PROCEDURE — 99282 EMERGENCY DEPT VISIT SF MDM: CPT

## 2018-11-11 RX ORDER — AMOXICILLIN 500 MG/1
500 CAPSULE ORAL EVERY 8 HOURS SCHEDULED
Qty: 21 CAPSULE | Refills: 0 | Status: SHIPPED | OUTPATIENT
Start: 2018-11-11 | End: 2018-11-18

## 2018-11-11 NOTE — ED PROVIDER NOTES
History  Chief Complaint   Patient presents with    Ear Problem     pt sent here from [facility] now regarding r ear pain   had tubes put on r ear Aug 20 this year  last night r ear started throbbing and started draining blood from r ear     79year old female hx ear tubes presents with R ear bleeding x 1 day  She was evaluated by urgent care earlier today who referred her to the ED for further management and ENT consult  Patient has a R sided earache  She has been taking tylenol for the pain  She denies ear trauma - no q-tips in ear, no head trauma, no ear ringing  She denies any decreased hearing, fever, chills headache, dizziness, chest pain, shortness of breath, difficulty breathing  Prior to Admission Medications   Prescriptions Last Dose Informant Patient Reported? Taking? BREO ELLIPTA 100-25 MCG/INH inhaler   No No   Sig: INHALE 1 PUFF DAILY RINSE  MOUTH AFTER USE     FISH OIL-KRILL OIL PO   Yes No   Sig: Take by mouth   Lactobacillus (PRIMADOPHILUS PO)  Self Yes No   Sig: Take by mouth     Olive Leaf Extract 500 MG CAPS  Self Yes No   Sig: Take by mouth as needed     albuterol (PROVENTIL HFA,VENTOLIN HFA) 90 mcg/act inhaler  Self Yes No   Sig: Inhale 2 puffs every 4 (four) hours as needed for wheezing   ascorbic acid (VITAMIN C) 500 mg tablet   Yes No   Sig: Take 500 mg by mouth 2 (two) times a day   fluticasone (FLONASE) 50 mcg/act nasal spray  Self No No   Si spray into each nostril daily   Patient not taking: Reported on 2018    fluticasone (FLOVENT HFA) 220 mcg/act inhaler  Self No No   Sig: Inhale 1 puff 2 (two) times a day   Patient not taking: Reported on 2018    fluticasone-salmeterol (ADVAIR) 250-50 mcg/dose inhaler  Self No No   Sig: Inhale 1 puff every 12 (twelve) hours   Patient not taking: Reported on 2018    losartan (COZAAR) 50 mg tablet   No No   Sig: Take 1 tablet (50 mg total) by mouth daily   montelukast (SINGULAIR) 10 mg tablet  Self No No   Sig: Take 1 tablet (10 mg total) by mouth daily at bedtime   Patient not taking: Reported on 8/23/2018    multivitamin (THERAGRAN) TABS   Yes No   Sig: Take 1 tablet by mouth daily   predniSONE 20 mg tablet  Self No No   Sig: Take 1 tablet (20 mg total) by mouth daily   Patient not taking: Reported on 8/23/2018    rosuvastatin (CRESTOR) 10 MG tablet  Self Yes No   Sig: Take 10 mg by mouth daily      Facility-Administered Medications: None       Past Medical History:   Diagnosis Date    Abnormal inflammatory bowel disease panel     last assessed: 09/03/14    Asthma     Chest wall contusion     last assessed: 04/21/17    Dermatomycosis 08/02/2011    Hordeolum internum of left eye     last assessed: 06/20/13    Hx of oral aphthous ulcers 08/22/2011    Hypercalcemia 01/11/2011    Hyperlipidemia     Hypertension     Sciatica 05/02/2008    Ulceration of intestine     last assessed: 9/03/14       Past Surgical History:   Procedure Laterality Date    MYRINGOTOMY Bilateral        Family History   Problem Relation Age of Onset    No Known Problems Mother         macular degeneration per allscripts    No Known Problems Father      I have reviewed and agree with the history as documented  Social History   Substance Use Topics    Smoking status: Former Smoker     Packs/day: 2 00     Years: 20 00     Quit date: 6/20/2005    Smokeless tobacco: Never Used    Alcohol use Yes      Comment: occasional         Review of Systems   Constitutional: Negative for chills and fever  HENT: Positive for ear discharge and ear pain  Negative for sneezing and sore throat  Respiratory: Negative for cough and shortness of breath  Cardiovascular: Negative for chest pain, palpitations and leg swelling  Gastrointestinal: Negative for abdominal pain, constipation, diarrhea, nausea and vomiting  Musculoskeletal: Negative for arthralgias, back pain, gait problem and joint swelling     Skin: Negative for color change, pallor, rash and wound    Neurological: Negative for dizziness, syncope, weakness, light-headedness, numbness and headaches  Psychiatric/Behavioral: Negative for agitation  All other systems reviewed and are negative  Physical Exam  Physical Exam   Constitutional: She appears well-developed and well-nourished  No distress  HENT:   Head: Normocephalic and atraumatic  Right Ear: Hearing and external ear normal  There is drainage (blood)  Left Ear: Hearing, tympanic membrane, external ear and ear canal normal  Tympanic membrane is not perforated, not erythematous, not retracted and not bulging  Ears:    Nose: Nose normal    Mouth/Throat: Oropharynx is clear and moist  No oropharyngeal exudate  Eyes: EOM are normal    Neck: Normal range of motion  Cardiovascular: Normal rate, regular rhythm, normal heart sounds and intact distal pulses  Exam reveals no gallop and no friction rub  No murmur heard  Pulmonary/Chest: Effort normal and breath sounds normal  No respiratory distress  She has no wheezes  She has no rales  Sp02 is 98% indicating adequate oxygenation on room air   Skin: She is not diaphoretic  Nursing note and vitals reviewed        Vital Signs  ED Triage Vitals [11/11/18 1302]   Temperature Pulse Respirations Blood Pressure SpO2   (!) 97 2 °F (36 2 °C) 67 18 (!) 177/78 98 %      Temp Source Heart Rate Source Patient Position - Orthostatic VS BP Location FiO2 (%)   Temporal Monitor Sitting Right arm --      Pain Score       --           Vitals:    11/11/18 1302   BP: (!) 177/78   Pulse: 67   Patient Position - Orthostatic VS: Sitting       Visual Acuity      ED Medications  Medications - No data to display    Diagnostic Studies  Results Reviewed     None                 No orders to display              Procedures  Procedures       Phone Contacts  ED Phone Contact    ED Course  ED Course as of Nov 11 2123   Sun Nov 11, 2018   1314 Attempted to get in touch with ENT office to discuss case, left message with staff to be relayed to on call physician  Yonas CALIX  49  to Jhonkamilah Dick from ENT office, agreed to plan of abx for ear and outpatient follow up  MDM  Number of Diagnoses or Management Options  Ear bleeding, right:   Diagnosis management comments: Ear bleeding likely secondary to inner ear infection causing inflammation to TM  Discussed case with doctor on call at Dr Luis Acharya ENT office who agreed to giving patient abx and having outpatient follow up  Patient to call office tomorrow to make follow up appointment  Gave patient proper education regarding diagnosis  Answered all questions  Return to ED for any worsening of symptoms otherwise follow up with primary care physician for re-evaluation  Discussed plan with patient who verbalized understanding and agreed to plan  Amount and/or Complexity of Data Reviewed  Discuss the patient with other providers: yes (Discussed case with Dr Anna Norton)      CritCare Time    Disposition  Final diagnoses:   Ear bleeding, right     Time reflects when diagnosis was documented in both MDM as applicable and the Disposition within this note     Time User Action Codes Description Comment    11/11/2018  1:52 PM Vianca Gave Add [H92 21] Ear bleeding, right       ED Disposition     ED Disposition Condition Comment    Discharge  Safia Moncada discharge to home/self care      Condition at discharge: Good        Follow-up Information     Follow up With Specialties Details Why Contact Info Additional Information    Vinay Argueta MD Otolaryngology Call in 1 day call on Monday for follow up appointment for further evalution of ear bleeding [address] [place] [address] Emergency Department Emergency Medicine Go to As needed [address] [facility]  [phone] [facility], [place], [place], [address]          Discharge Medication List as of 11/11/2018  1:56 PM      START taking these medications    Details   amoxicillin (AMOXIL) 500 mg capsule Take 1 capsule (500 mg total) by mouth every 8 (eight) hours for 7 days, Starting Sun 11/11/2018, Until Sun 11/18/2018, Print         CONTINUE these medications which have NOT CHANGED    Details   albuterol (PROVENTIL HFA,VENTOLIN HFA) 90 mcg/act inhaler Inhale 2 puffs every 4 (four) hours as needed for wheezing, Historical Med      ascorbic acid (VITAMIN C) 500 mg tablet Take 500 mg by mouth 2 (two) times a day, Historical Med      BREO ELLIPTA 100-25 MCG/INH inhaler INHALE 1 PUFF DAILY RINSE  MOUTH AFTER USE , Starting Wed 9/19/2018, Normal      FISH OIL-KRILL OIL PO Take by mouth, Historical Med      fluticasone (FLONASE) 50 mcg/act nasal spray 1 spray into each nostril daily, Starting Thu 3/29/2018, Normal      fluticasone (FLOVENT HFA) 220 mcg/act inhaler Inhale 1 puff 2 (two) times a day, Starting Fri 3/30/2018, Normal      fluticasone-salmeterol (ADVAIR) 250-50 mcg/dose inhaler Inhale 1 puff every 12 (twelve) hours, Starting Wed 5/23/2018, Normal      Lactobacillus (PRIMADOPHILUS PO) Take by mouth  , Historical Med      losartan (COZAAR) 50 mg tablet Take 1 tablet (50 mg total) by mouth daily, Starting Thu 8/16/2018, Normal      montelukast (SINGULAIR) 10 mg tablet Take 1 tablet (10 mg total) by mouth daily at bedtime, Starting Thu 3/29/2018, Normal      multivitamin (THERAGRAN) TABS Take 1 tablet by mouth daily, Historical Med      Olive Leaf Extract 500 MG CAPS Take by mouth as needed  , Historical Med      predniSONE 20 mg tablet Take 1 tablet (20 mg total) by mouth daily, Starting Wed 5/23/2018, Normal      rosuvastatin (CRESTOR) 10 MG tablet Take 10 mg by mouth daily, Historical Med           No discharge procedures on file      ED Provider  Electronically Signed by           Cash Manuel PA-C  11/11/18 4143

## 2018-11-11 NOTE — DISCHARGE INSTRUCTIONS
Otitis Media   WHAT YOU NEED TO KNOW:   Otitis media is an ear infection  DISCHARGE INSTRUCTIONS:   Medicines:  · Ibuprofen or acetaminophen  helps decrease your pain and fever  They are available without a doctor's order  Ask your healthcare provider which medicine is right for you  Ask how much to take and how often to take it  These medicines can cause stomach bleeding if not taken correctly  Ibuprofen can cause kidney damage  Do not take ibuprofen if you have kidney disease, an ulcer, or allergies to aspirin  Acetaminophen can cause liver damage  Do not drink alcohol if you take acetaminophen  · Ear drops  help treat your ear pain  · Antibiotics  help treat a bacterial infection that caused your ear infection  · Take your medicine as directed  Contact your healthcare provider if you think your medicine is not helping or if you have side effects  Tell him or her if you are allergic to any medicine  Keep a list of the medicines, vitamins, and herbs you take  Include the amounts, and when and why you take them  Bring the list or the pill bottles to follow-up visits  Carry your medicine list with you in case of an emergency  Heat or ice:   · Heat  may be used to decrease your pain  Place a warm, moist washcloth on your ear  Apply for 15 to 20 minutes, 3 to 4 times a day    · Ice  helps decrease swelling and pain  Use an ice pack or put crushed ice in a plastic bag  Cover the ice pack with a towel and place it on your ear for 15 to 20 minutes, 3 to 4 times a day for 2 days  Prevent otitis media:   · Wash your hands often  Use soap and water  Wash your hands after you use the bathroom, change a child's diapers, or sneeze  Wash your hands before you prepare or eat food  · Stay away from people who are ill  Some germs are easily and quickly spread through contact  Return to work or school: You may return to work or school when your fever is gone     Follow up with your healthcare provider as directed:  Write down your questions so you remember to ask them during your visits  Contact your healthcare provider if:   · Your ear pain gets worse or does not go away, even after treatment  · The outside of your ear is red or swollen  · You have vomiting or diarrhea  · You have fluid coming from your ear  · You have questions or concerns about your condition or care  Return to the emergency department if:   · You have a seizure  · You have a fever and a stiff neck  © 2017 [facility] Information is for End User's use only and may not be sold, redistributed or otherwise used for commercial purposes  All illustrations and images included in CareNotes® are the copyrighted property of A D A M , Inc  or Rei Ramos  The above information is an  only  It is not intended as medical advice for individual conditions or treatments  Talk to your doctor, nurse or pharmacist before following any medical regimen to see if it is safe and effective for you

## 2018-11-11 NOTE — PATIENT INSTRUCTIONS
Patient has bloody drainage in her right ear canal, ear tube is slightly visible, otherwise no ear structures are visible due to the amount of blood  At this time the patient has been redirected to [facility] ER for further evaluation and treatment and consult w/ ENT in the ER as necessary  Patient verbalizes understanding and agrees w/ treatment plan

## 2018-11-11 NOTE — PROGRESS NOTES
[facility]        NAME: Zach Streeter is a 79 y o  female  : 1950    MRN: [id number]  DATE: 2018  TIME: 12:14 PM    Assessment and Plan   Blood in ear canal, right [H92 21]  1  Blood in ear canal, right  Ambulatory Referral to Emergency Medicine         Patient Instructions     Patient Instructions   Patient has bloody drainage in her right ear canal, ear tube is slightly visible, otherwise no ear structures are visible due to the amount of blood  At this time the patient has been redirected to [facility] ER for further evaluation and treatment and consult w/ ENT in the ER as necessary  Patient verbalizes understanding and agrees w/ treatment plan  Chief Complaint     Chief Complaint   Patient presents with    Earache     ear tube placement 3 months ago, pain in right ear, states bloody discharge this morning  History of Present Illness       78 yo female, states she had a tube placed in her right ear 3 months ago by ENT at [facility]  She states yesterday she began with right ear pain, and this morning she has been having bloody drainage from the ear  She denies any injury to the ear  She denies using a Q-tip or inserting anything into the ear canal  No head trauma  No ringing in ears or hearing loss  No headache or dizziness  No fever  No cold/URI sx  She took Tylenol for the ear pain  Review of Systems   Review of Systems   Constitutional: Negative  HENT:        As noted in HPI   Eyes: Negative  Neurological: Negative            Current Medications       Current Outpatient Prescriptions:     albuterol (PROVENTIL HFA,VENTOLIN HFA) 90 mcg/act inhaler, Inhale 2 puffs every 4 (four) hours as needed for wheezing, Disp: , Rfl:     ascorbic acid (VITAMIN C) 500 mg tablet, Take 500 mg by mouth 2 (two) times a day, Disp: , Rfl:     BREO ELLIPTA 100-25 MCG/INH inhaler, INHALE 1 PUFF DAILY RINSE  MOUTH AFTER USE , Disp: 1 Inhaler, Rfl: 5    FISH OIL-KRILL OIL PO, Take by mouth, Disp: , Rfl:     Lactobacillus (PRIMADOPHILUS PO), Take by mouth  , Disp: , Rfl:     losartan (COZAAR) 50 mg tablet, Take 1 tablet (50 mg total) by mouth daily, Disp: 90 tablet, Rfl: 0    multivitamin (THERAGRAN) TABS, Take 1 tablet by mouth daily, Disp: , Rfl:     Olive Leaf Extract 500 MG CAPS, Take by mouth as needed  , Disp: , Rfl:     rosuvastatin (CRESTOR) 10 MG tablet, Take 10 mg by mouth daily, Disp: , Rfl:     fluticasone (FLONASE) 50 mcg/act nasal spray, 1 spray into each nostril daily (Patient not taking: Reported on 8/23/2018 ), Disp: 16 g, Rfl: 0    fluticasone (FLOVENT HFA) 220 mcg/act inhaler, Inhale 1 puff 2 (two) times a day (Patient not taking: Reported on 8/23/2018 ), Disp: 1 Inhaler, Rfl: 6    fluticasone-salmeterol (ADVAIR) 250-50 mcg/dose inhaler, Inhale 1 puff every 12 (twelve) hours (Patient not taking: Reported on 8/23/2018 ), Disp: 1 Inhaler, Rfl: 0    montelukast (SINGULAIR) 10 mg tablet, Take 1 tablet (10 mg total) by mouth daily at bedtime (Patient not taking: Reported on 8/23/2018 ), Disp: 30 tablet, Rfl: 3    predniSONE 20 mg tablet, Take 1 tablet (20 mg total) by mouth daily (Patient not taking: Reported on 8/23/2018 ), Disp: 5 tablet, Rfl: 0    Current Allergies     Allergies as of 11/11/2018 - Reviewed 11/11/2018   Allergen Reaction Noted    Molds & smuts Shortness Of Breath 07/05/2018            The following portions of the patient's history were reviewed and updated as appropriate: allergies, current medications, past family history, past medical history, past social history, past surgical history and problem list      Past Medical History:   Diagnosis Date    Abnormal inflammatory bowel disease panel     last assessed: 09/03/14    Asthma     Chest wall contusion     last assessed: 04/21/17    Dermatomycosis 08/02/2011    Hordeolum internum of left eye     last assessed: 06/20/13    Hx of oral aphthous ulcers 08/22/2011    Hypercalcemia 01/11/2011    Hyperlipidemia     Hypertension     Sciatica 05/02/2008    Ulceration of intestine     last assessed: 9/03/14       Past Surgical History:   Procedure Laterality Date    MYRINGOTOMY Bilateral        Family History   Problem Relation Age of Onset    No Known Problems Mother         macular degeneration per allscripts    No Known Problems Father          Medications have been verified  Objective   /78   Pulse 73   Temp 99 4 °F (37 4 °C)   Resp 16   Ht 5' 9" (1 753 m)   Wt 85 7 kg (189 lb)   SpO2 98%   Breastfeeding? No   BMI 27 91 kg/m²        Physical Exam     Physical Exam   Constitutional: She is oriented to person, place, and time  She appears well-developed and well-nourished  No distress  HENT:   Head: Normocephalic and atraumatic  Right Ear: Hearing and external ear normal  No mastoid tenderness  Left Ear: Hearing, tympanic membrane, external ear and ear canal normal    Nose: Nose normal    Copious amount of blood noted in the right ear canal  Ear tube tip is visible, otherwise no structures visible due to blood  Unable to visualize TM  Neck: Normal range of motion  Neck supple  No neck rigidity  No edema, no erythema and normal range of motion present  Neurological: She is alert and oriented to person, place, and time  Skin: She is not diaphoretic  Psychiatric: She has a normal mood and affect  Her behavior is normal  Judgment and thought content normal    Nursing note and vitals reviewed

## 2018-11-29 ENCOUNTER — OFFICE VISIT (OUTPATIENT)
Dept: OTOLARYNGOLOGY | Facility: CLINIC | Age: 68
End: 2018-11-29
Payer: COMMERCIAL

## 2018-11-29 VITALS
DIASTOLIC BLOOD PRESSURE: 86 MMHG | BODY MASS INDEX: 26.77 KG/M2 | WEIGHT: 187 LBS | HEIGHT: 70 IN | SYSTOLIC BLOOD PRESSURE: 120 MMHG

## 2018-11-29 DIAGNOSIS — H66.001 ACUTE SUPPURATIVE OTITIS MEDIA OF RIGHT EAR WITHOUT SPONTANEOUS RUPTURE OF TYMPANIC MEMBRANE, RECURRENCE NOT SPECIFIED: Primary | ICD-10-CM

## 2018-11-29 PROCEDURE — 99213 OFFICE O/P EST LOW 20 MIN: CPT | Performed by: OTOLARYNGOLOGY

## 2018-11-29 NOTE — PROGRESS NOTES
[facility] Otolaryngology Follow up visit      CC: right otitis media      Time interval of problem since last visit:  2 weeks    Pertinent interval elements of the history:  She is status post treatment with ciprodex for an acute right sided otitis media  Symptoms initially severe, with associated bloody otorrhea and hearing loss  Now much improved with resolution or otalgia, otorrhea and hearing loss  Ear drops were helpful  Review of any relevant imaging:      Interval Review of systems:  General: no weight change, normal energy  CV: no palpitations or chest pain  Pulm: no shortness of breath    Interval Social History:  Social History     Social History    Marital status: /Civil Union     Spouse name: N/A    Number of children: N/A    Years of education: N/A     Occupational History    Not on file  Social History Main Topics    Smoking status: Former Smoker     Packs/day: 2 00     Years: 20 00     Quit date: 6/20/2005    Smokeless tobacco: Never Used    Alcohol use Yes      Comment: occasional     Drug use: No    Sexual activity: Not on file     Other Topics Concern    Not on file     Social History Narrative    No narrative on file       Interval Physical Examination:  /86   Ht 5' 10" (1 778 m)   Wt 84 8 kg (187 lb)   BMI 26 83 kg/m²   NAD  AD: TIPP, no ME effusion, no infection  AS: clear, TMI    Interval endoscopy:          Assessment:  1  Acute suppurative otitis media of right ear without spontaneous rupture of tympanic membrane, recurrence not specified         Plan:  1  Resolved right otitis media  Follow up in 6 months for routine tube check

## 2018-11-29 NOTE — LETTER
November 29, 2018     Elieser Ano, [id number]    Patient: Luanne Terrazas   YOB: 1950   Date of Visit: 11/29/2018       Dear Dr Maryuri Robertson: Thank you for referring Tika Mo to me for evaluation  Below are my notes for this consultation  If you have questions, please do not hesitate to call me  I look forward to following your patient along with you           Sincerely,        Nabila Morales MD        CC: No Recipients

## 2018-12-02 DIAGNOSIS — E78.5 HYPERLIPIDEMIA, UNSPECIFIED HYPERLIPIDEMIA TYPE: Primary | ICD-10-CM

## 2018-12-03 RX ORDER — ROSUVASTATIN CALCIUM 10 MG/1
TABLET, COATED ORAL
Qty: 90 TABLET | Refills: 3 | Status: SHIPPED | OUTPATIENT
Start: 2018-12-03 | End: 2019-10-16 | Stop reason: SDUPTHER

## 2018-12-05 ENCOUNTER — TELEPHONE (OUTPATIENT)
Dept: OTOLARYNGOLOGY | Facility: CLINIC | Age: 68
End: 2018-12-05

## 2018-12-05 NOTE — TELEPHONE ENCOUNTER
Patient called stating that she received her CHAPARRITA for her 7-5-18 visit stating that she had and was charged for a nasal endoscopy  Patient is disputing that she had that done  Her visit note states that she had one but is still disputing  She would like the note amended and the charge removed  Patient can be reached at [phone]   Thank you

## 2019-02-04 DIAGNOSIS — I10 BENIGN ESSENTIAL HYPERTENSION: ICD-10-CM

## 2019-02-04 RX ORDER — LOSARTAN POTASSIUM 50 MG/1
50 TABLET ORAL DAILY
Qty: 90 TABLET | Refills: 3 | Status: SHIPPED | OUTPATIENT
Start: 2019-02-04 | End: 2019-10-16 | Stop reason: SDUPTHER

## 2019-05-30 ENCOUNTER — OFFICE VISIT (OUTPATIENT)
Dept: OTOLARYNGOLOGY | Facility: CLINIC | Age: 69
End: 2019-05-30
Payer: COMMERCIAL

## 2019-05-30 VITALS
BODY MASS INDEX: 26.2 KG/M2 | HEIGHT: 70 IN | DIASTOLIC BLOOD PRESSURE: 88 MMHG | SYSTOLIC BLOOD PRESSURE: 136 MMHG | WEIGHT: 183 LBS

## 2019-05-30 DIAGNOSIS — H65.491 COME (CHRONIC OTITIS MEDIA WITH EFFUSION), RIGHT: ICD-10-CM

## 2019-05-30 DIAGNOSIS — H69.81 ETD (EUSTACHIAN TUBE DYSFUNCTION), RIGHT: Primary | ICD-10-CM

## 2019-05-30 PROCEDURE — 69433 CREATE EARDRUM OPENING: CPT | Performed by: OTOLARYNGOLOGY

## 2019-05-30 PROCEDURE — 99213 OFFICE O/P EST LOW 20 MIN: CPT | Performed by: OTOLARYNGOLOGY

## 2019-09-06 ENCOUNTER — APPOINTMENT (OUTPATIENT)
Dept: RADIOLOGY | Facility: CLINIC | Age: 69
End: 2019-09-06
Payer: COMMERCIAL

## 2019-09-06 ENCOUNTER — OFFICE VISIT (OUTPATIENT)
Dept: URGENT CARE | Facility: CLINIC | Age: 69
End: 2019-09-06
Payer: COMMERCIAL

## 2019-09-06 VITALS
RESPIRATION RATE: 17 BRPM | SYSTOLIC BLOOD PRESSURE: 159 MMHG | DIASTOLIC BLOOD PRESSURE: 83 MMHG | TEMPERATURE: 99.5 F | OXYGEN SATURATION: 99 % | WEIGHT: 181 LBS | BODY MASS INDEX: 25.91 KG/M2 | HEART RATE: 75 BPM | HEIGHT: 70 IN

## 2019-09-06 DIAGNOSIS — M25.551 RIGHT HIP PAIN: ICD-10-CM

## 2019-09-06 DIAGNOSIS — M79.604 PAIN OF RIGHT LOWER EXTREMITY: ICD-10-CM

## 2019-09-06 DIAGNOSIS — M79.604 PAIN OF RIGHT LOWER EXTREMITY: Primary | ICD-10-CM

## 2019-09-06 DIAGNOSIS — M76.51 PATELLAR TENDINITIS OF RIGHT KNEE: ICD-10-CM

## 2019-09-06 PROCEDURE — 99213 OFFICE O/P EST LOW 20 MIN: CPT | Performed by: PHYSICIAN ASSISTANT

## 2019-09-06 PROCEDURE — 73564 X-RAY EXAM KNEE 4 OR MORE: CPT

## 2019-09-06 NOTE — PROGRESS NOTES
[facility]        NAME: Ton Gerard is a 79 y o  female  : 1950    MRN: [id number]  DATE: 2019  TIME: 1:20 PM    Assessment and Plan   Pain of right lower extremity [M79 604]  1  Pain of right lower extremity  XR knee 4+ vw right injury    Ambulatory referral to Orthopedic Surgery    CANCELED: XR foot 3+ vw right    CANCELED: XR knee 3 vw right non injury    CANCELED: XR spine lumbar minimum 4 views non injury   2  Patellar tendinitis of right knee     3  Right hip pain       Patient Instructions     Apply ice to your back for 20-30 minutes at a time, 3-4 times daily  You may apply heat for 20-30 minutes at a time, as needed for stiffness  Take ibuprofen 600 mg every 6-8 hours as directed  Continue tylenol for residual pain relief  Rest but do not fully immobilize yourself  Perform stretching exercises 2-3 times daily as reviewed in office  Follow up with your family doctor or an orthopedist in 3-5 days if symptoms persist  Proceed to the ER if symptoms worsen  X-ray findings reviewed with patient  Patient advised to follow up with Orthopedics for further evaluation and treatment  She was agreeable  All questions answered  Precautions given  Chief Complaint     Chief Complaint   Patient presents with    Back Pain     Pt reports of right lower back pain started approx 1 month ago  Pt also reports of right foot pain and right knee pain  History of Present Illness   30-year-old female presenting with complaint of right low back pain x1 month  She reports a constant aching pain of the right buttocks and outer right knee  Pain is continuous between these areas and is not radiating  Reports numbness of the right foot with prolonged activity, typically after being on feet for hours without rest, but no other N/T/W  No redness, bruising, or swelling  Patient reports at onset she was doing a lot of yard work and had been leaning over her ladder to trip hedges   Denies a specific onset of pain while doing this, noting pain began a day or two later  Pain was intermittent at onset but is now constant  She reports a hx of sciatica but reports this feels different, as pain is more lateral  Pain is worse with arising from seated position, lingering for first few seconds of standing  Pain then decreases with prolonged activity, with pt noting a sensation her muscles are loosening  She is treating with tylenol intermittently without relief  Has tried Aspercreme last night without change  No change with driving  Denies having any seating with knees above hip height  Review of Systems   Review of Systems   Constitutional: Negative for chills, diaphoresis, fatigue and fever  Respiratory: Negative for cough  Cardiovascular: Negative for chest pain  Gastrointestinal: Negative for abdominal pain, nausea and vomiting  Musculoskeletal: Negative for arthralgias and myalgias  Other than as noted in HPI  Skin: Negative for color change, rash and wound  Neurological: Negative for weakness, light-headedness, numbness and headaches         Current Medications       Current Outpatient Medications:     albuterol (PROVENTIL HFA,VENTOLIN HFA) 90 mcg/act inhaler, Inhale 2 puffs every 4 (four) hours as needed for wheezing, Disp: , Rfl:     ascorbic acid (VITAMIN C) 500 mg tablet, Take 500 mg by mouth 2 (two) times a day, Disp: , Rfl:     BREO ELLIPTA 100-25 MCG/INH inhaler, INHALE 1 PUFF DAILY RINSE  MOUTH AFTER USE , Disp: 1 Inhaler, Rfl: 5    FISH OIL-KRILL OIL PO, Take by mouth, Disp: , Rfl:     fluticasone (FLONASE) 50 mcg/act nasal spray, 1 spray into each nostril daily (Patient not taking: Reported on 11/29/2018 ), Disp: 16 g, Rfl: 0    fluticasone (FLOVENT HFA) 220 mcg/act inhaler, Inhale 1 puff 2 (two) times a day (Patient not taking: Reported on 11/29/2018 ), Disp: 1 Inhaler, Rfl: 6    fluticasone-salmeterol (ADVAIR) 250-50 mcg/dose inhaler, Inhale 1 puff every 12 (twelve) hours (Patient not taking: Reported on 11/29/2018 ), Disp: 1 Inhaler, Rfl: 0    Lactobacillus (PRIMADOPHILUS PO), Take by mouth  , Disp: , Rfl:     losartan (COZAAR) 50 mg tablet, Take 1 tablet (50 mg total) by mouth daily, Disp: 90 tablet, Rfl: 3    montelukast (SINGULAIR) 10 mg tablet, Take 1 tablet (10 mg total) by mouth daily at bedtime (Patient not taking: Reported on 5/30/2019), Disp: 30 tablet, Rfl: 3    multivitamin (THERAGRAN) TABS, Take 1 tablet by mouth daily, Disp: , Rfl:     Olive Leaf Extract 500 MG CAPS, Take by mouth as needed  , Disp: , Rfl:     predniSONE 20 mg tablet, Take 1 tablet (20 mg total) by mouth daily (Patient not taking: Reported on 11/29/2018 ), Disp: 5 tablet, Rfl: 0    rosuvastatin (CRESTOR) 10 MG tablet, TAKE 1 TABLET DAILY AT  BEDTIME, Disp: 90 tablet, Rfl: 3    Current Allergies     Allergies as of 09/06/2019 - Reviewed 09/06/2019   Allergen Reaction Noted    Molds & smuts Shortness Of Breath 07/05/2018            The following portions of the patient's history were reviewed and updated as appropriate: allergies, current medications, past family history, past medical history, past social history, past surgical history and problem list      Past Medical History:   Diagnosis Date    Abnormal inflammatory bowel disease panel     last assessed: 09/03/14    Asthma     Chest wall contusion     last assessed: 04/21/17    Dermatomycosis 08/02/2011    Hordeolum internum of left eye     last assessed: 06/20/13    Hx of oral aphthous ulcers 08/22/2011    Hypercalcemia 01/11/2011    Hyperlipidemia     Hypertension     Sciatica 05/02/2008    Ulceration of intestine     last assessed: 9/03/14       Past Surgical History:   Procedure Laterality Date    MYRINGOTOMY Bilateral        Family History   Problem Relation Age of Onset    No Known Problems Mother         macular degeneration per allscripts    No Known Problems Father      Medications have been verified        Objective   BP 159/83   Pulse 75   Temp 99 5 °F (37 5 °C)   Resp 17   Ht 5' 10" (1 778 m)   Wt 82 1 kg (181 lb)   SpO2 99%   BMI 25 97 kg/m²      Right knee XR: No acute osseous abnormality  Severe patellofemoral degenerative osteoarthritis with joint space narrowing and osteophyte formation  There is mild degenerative osteoarthritis of the medial and lateral compartment  Mild medial and lateral osteophyte formation  Physical Exam     Physical Exam   Constitutional: She is oriented to person, place, and time  Vital signs are normal  She appears well-developed and well-nourished  She is cooperative  She does not appear ill  No distress  HENT:   Head: Normocephalic and atraumatic  Eyes: Conjunctivae and lids are normal  Right eye exhibits no chemosis, no discharge and no exudate  Left eye exhibits no chemosis, no discharge and no exudate  Right conjunctiva is not injected  Left conjunctiva is not injected  Cardiovascular: Normal rate, regular rhythm and normal heart sounds  Exam reveals no gallop, no distant heart sounds and no friction rub  No murmur heard  Pulmonary/Chest: Effort normal and breath sounds normal  No stridor  No respiratory distress  She has no decreased breath sounds  She has no wheezes  She has no rhonchi  She has no rales  Abdominal: Soft  Bowel sounds are normal  She exhibits no distension and no mass  There is no tenderness  There is no rigidity, no rebound and no guarding  Musculoskeletal:   Tenderness of the right lateral buttocks and patellar tendon  No overlying skin abnormality, edema, or overt deformity  There is no vertebral or PSM tenderness of the lumbar spine and no right thigh tenderness  Near complete ROM of the hip with no change in pain with movement  No tenderness of the hip, though pain is reported over the proximal femur  Negative straight leg raise  Patellar tendon discomfort reported upon arising from seated position   Decreased flexion of the knee, but no laxity with provocative testing  Negative Melba testing  Distal extremity is neurovascularly intact  Strength 5/5  Antalgic gait  Neurological: She is alert and oriented to person, place, and time  She has normal strength  She is not disoriented  No cranial nerve deficit  She exhibits normal muscle tone  Coordination and gait normal    Skin: Skin is warm, dry and intact  No rash noted  She is not diaphoretic  No erythema  No pallor  Psychiatric: She has a normal mood and affect  Her behavior is normal  Judgment and thought content normal    Nursing note and vitals reviewed

## 2019-09-06 NOTE — PATIENT INSTRUCTIONS
Apply ice to your back for 20-30 minutes at a time, 3-4 times daily  You may apply heat for 20-30 minutes at a time, as needed for stiffness  Take ibuprofen 600 mg every 6-8 hours as directed  Continue tylenol for residual pain relief  Rest but do not fully immobilize yourself  Perform stretching exercises 2-3 times daily as reviewed in office  Follow up with your family doctor or an orthopedist in 3-5 days if symptoms persist  Proceed to the ER if symptoms worsen

## 2019-09-12 ENCOUNTER — OFFICE VISIT (OUTPATIENT)
Dept: OBGYN CLINIC | Facility: CLINIC | Age: 69
End: 2019-09-12
Payer: COMMERCIAL

## 2019-09-12 VITALS
BODY MASS INDEX: 25.77 KG/M2 | WEIGHT: 180 LBS | HEIGHT: 70 IN | DIASTOLIC BLOOD PRESSURE: 78 MMHG | SYSTOLIC BLOOD PRESSURE: 118 MMHG | HEART RATE: 85 BPM

## 2019-09-12 DIAGNOSIS — M17.11 LOCALIZED OSTEOARTHRITIS OF RIGHT KNEE: ICD-10-CM

## 2019-09-12 PROCEDURE — 99203 OFFICE O/P NEW LOW 30 MIN: CPT | Performed by: ORTHOPAEDIC SURGERY

## 2019-09-12 NOTE — PROGRESS NOTES
Assessment/Plan:  1  Localized osteoarthritis of right knee  Ambulatory referral to Orthopedic Surgery       Safia has right-sided knee pain consistent with patellofemoral osteoarthritis  She does appear to have severe degenerative changes in her patellofemoral joint and mild arthritic changes throughout the rest of the knee  I discussed multiple treatment options with her today including conservative treatment of rest, ice, anti-inflammatories and home exercises  She agreed with doing all of the above but does not seem to be interested in a cortisone injection at this time  I discussed with her down the road we could consider injection treatments however she will stick with conservative treatments including bracing and home exercises at this time  She will follow up in the office in 6 weeks if her knee pain persists  Subjective:   Safia Moncada is a 79 y o  female who presents to the office for evaluation for right-sided knee pain  She did present to urgent care 1 week ago and had an x-ray and was told she had knee tendinitis  She denies any specific injury or fall  She has had increasing , intermittent aching discomfort over the anterior aspect of her right knee  It worsens with walking or stairs and particularly bothers her going from a seated to standing position  She denies any swelling or effusion  She denies any previous knee pain or surgery in the past   She denies any instability in the knee  She denies any pain in the left knee  She is retired but states that she has been doing some more yard work to help out her  and feels like that may have caused more irritation in her knee  Review of Systems   Constitutional: Negative for chills, fever and unexpected weight change  HENT: Negative for hearing loss, nosebleeds and sore throat  Eyes: Negative for pain, redness and visual disturbance  Respiratory: Negative for cough, shortness of breath and wheezing      Cardiovascular: Negative for chest pain, palpitations and leg swelling  Gastrointestinal: Negative for abdominal pain, nausea and vomiting  Endocrine: Negative for polydipsia and polyuria  Genitourinary: Negative for dysuria and hematuria  Musculoskeletal:        See HPI   Skin: Negative for rash and wound  Neurological: Negative for dizziness, numbness and headaches  Psychiatric/Behavioral: Negative for decreased concentration and suicidal ideas  The patient is not nervous/anxious            Past Medical History:   Diagnosis Date    Abnormal inflammatory bowel disease panel     last assessed: 14    Asthma     Chest wall contusion     last assessed: 17    Dermatomycosis 2011    Hordeolum internum of left eye     last assessed: 13    Hx of oral aphthous ulcers 2011    Hypercalcemia 2011    Hyperlipidemia     Hypertension     Sciatica 2008    Ulceration of intestine     last assessed: 14       Past Surgical History:   Procedure Laterality Date    MYRINGOTOMY Bilateral        Family History   Problem Relation Age of Onset    No Known Problems Mother         macular degeneration per allscripts    No Known Problems Father        Social History     Occupational History    Not on file   Tobacco Use    Smoking status: Former Smoker     Packs/day: 2 00     Years: 20 00     Pack years: 40 00     Last attempt to quit: 2005     Years since quittin 2    Smokeless tobacco: Never Used   Substance and Sexual Activity    Alcohol use: Yes     Comment: occasional     Drug use: No    Sexual activity: Not on file         Current Outpatient Medications:     albuterol (PROVENTIL HFA,VENTOLIN HFA) 90 mcg/act inhaler, Inhale 2 puffs every 4 (four) hours as needed for wheezing, Disp: , Rfl:     ascorbic acid (VITAMIN C) 500 mg tablet, Take 500 mg by mouth 2 (two) times a day, Disp: , Rfl:     BREO ELLIPTA 100-25 MCG/INH inhaler, INHALE 1 PUFF DAILY RINSE  MOUTH AFTER USE , Disp: 1 Inhaler, Rfl: 5    FISH OIL-KRILL OIL PO, Take by mouth, Disp: , Rfl:     Lactobacillus (PRIMADOPHILUS PO), Take by mouth  , Disp: , Rfl:     losartan (COZAAR) 50 mg tablet, Take 1 tablet (50 mg total) by mouth daily, Disp: 90 tablet, Rfl: 3    multivitamin (THERAGRAN) TABS, Take 1 tablet by mouth daily, Disp: , Rfl:     Olive Leaf Extract 500 MG CAPS, Take by mouth as needed  , Disp: , Rfl:     rosuvastatin (CRESTOR) 10 MG tablet, TAKE 1 TABLET DAILY AT  BEDTIME, Disp: 90 tablet, Rfl: 3    Allergies   Allergen Reactions    Molds & Smuts Shortness Of Breath     Mold and mildew       Objective:  Vitals:    09/12/19 1503   BP: 118/78   Pulse: 85       Right Knee Exam     Tenderness   The patient is experiencing tenderness in the patella and medial joint line  Range of Motion   Extension: normal   Flexion: normal     Tests   Melba:  Medial - negative Lateral - negative  Varus: negative Valgus: negative    Other   Erythema: absent  Sensation: normal  Pulse: present  Swelling: mild  Effusion: no effusion present          Observations     Right Knee   Negative for effusion  Physical Exam   Constitutional: She is oriented to person, place, and time  She appears well-developed and well-nourished  HENT:   Head: Normocephalic and atraumatic  Eyes: Pupils are equal, round, and reactive to light  Conjunctivae are normal    Neck: Normal range of motion  Neck supple  Cardiovascular: Normal rate and intact distal pulses  Pulmonary/Chest: Effort normal  No respiratory distress  Musculoskeletal:        Right knee: She exhibits no effusion  Neurological: She is alert and oriented to person, place, and time  Skin: Skin is warm and dry  Psychiatric: She has a normal mood and affect  Her behavior is normal    Vitals reviewed  I have personally reviewed pertinent films in PACS and my interpretation is as follows:   Four view x-rays of the right knee dated 9/6/2019 demonstrates severe patellofemoral osteoarthritis and mild medial and lateral compartment osteoarthritis  No evidence of fracture

## 2019-10-09 DIAGNOSIS — E78.5 HYPERLIPIDEMIA, UNSPECIFIED HYPERLIPIDEMIA TYPE: ICD-10-CM

## 2019-10-10 RX ORDER — ROSUVASTATIN CALCIUM 10 MG/1
TABLET, COATED ORAL
Qty: 90 TABLET | Refills: 3 | OUTPATIENT
Start: 2019-10-10

## 2019-10-10 NOTE — TELEPHONE ENCOUNTER
Pt was not seeing for 2 y -  Med was declined -  Needs an simone -  Pl,check is AWV is needed  - if pt has Medicare

## 2019-10-16 ENCOUNTER — OFFICE VISIT (OUTPATIENT)
Dept: FAMILY MEDICINE CLINIC | Facility: CLINIC | Age: 69
End: 2019-10-16
Payer: COMMERCIAL

## 2019-10-16 VITALS
TEMPERATURE: 98.3 F | HEART RATE: 80 BPM | HEIGHT: 70 IN | SYSTOLIC BLOOD PRESSURE: 116 MMHG | RESPIRATION RATE: 18 BRPM | BODY MASS INDEX: 26.92 KG/M2 | DIASTOLIC BLOOD PRESSURE: 80 MMHG | WEIGHT: 188 LBS

## 2019-10-16 DIAGNOSIS — E21.3 HYPERPARATHYROIDISM (HCC): ICD-10-CM

## 2019-10-16 DIAGNOSIS — J45.40 MODERATE PERSISTENT ASTHMA WITHOUT COMPLICATION: ICD-10-CM

## 2019-10-16 DIAGNOSIS — E78.00 HYPERCHOLESTEROLEMIA: ICD-10-CM

## 2019-10-16 DIAGNOSIS — I10 BENIGN ESSENTIAL HYPERTENSION: Primary | ICD-10-CM

## 2019-10-16 DIAGNOSIS — Z00.00 ROUTINE MEDICAL EXAM: ICD-10-CM

## 2019-10-16 PROBLEM — H92.21 BLOOD IN EAR CANAL, RIGHT: Status: RESOLVED | Noted: 2018-11-11 | Resolved: 2019-10-16

## 2019-10-16 PROCEDURE — 3079F DIAST BP 80-89 MM HG: CPT | Performed by: FAMILY MEDICINE

## 2019-10-16 PROCEDURE — G0438 PPPS, INITIAL VISIT: HCPCS | Performed by: FAMILY MEDICINE

## 2019-10-16 PROCEDURE — 1125F AMNT PAIN NOTED PAIN PRSNT: CPT | Performed by: FAMILY MEDICINE

## 2019-10-16 PROCEDURE — 1101F PT FALLS ASSESS-DOCD LE1/YR: CPT | Performed by: FAMILY MEDICINE

## 2019-10-16 PROCEDURE — 3074F SYST BP LT 130 MM HG: CPT | Performed by: FAMILY MEDICINE

## 2019-10-16 PROCEDURE — 1170F FXNL STATUS ASSESSED: CPT | Performed by: FAMILY MEDICINE

## 2019-10-16 PROCEDURE — 99214 OFFICE O/P EST MOD 30 MIN: CPT | Performed by: FAMILY MEDICINE

## 2019-10-16 PROCEDURE — 3008F BODY MASS INDEX DOCD: CPT | Performed by: FAMILY MEDICINE

## 2019-10-16 RX ORDER — LOSARTAN POTASSIUM 50 MG/1
50 TABLET ORAL DAILY
Qty: 90 TABLET | Refills: 3 | Status: SHIPPED | OUTPATIENT
Start: 2019-10-16 | End: 2020-01-14 | Stop reason: SDUPTHER

## 2019-10-16 RX ORDER — ROSUVASTATIN CALCIUM 10 MG/1
10 TABLET, COATED ORAL
Qty: 90 TABLET | Refills: 3 | Status: SHIPPED | OUTPATIENT
Start: 2019-10-16 | End: 2020-09-14

## 2019-10-16 NOTE — PROGRESS NOTES
Chief Complaint   Patient presents with    Blood Pressure Check    Medicare Wellness Visit        Patient ID: Nicky Mata is a 81 y o  female  HPI  Pt is seeing for f/u HTN, HLD, Asthma, Hyperparathyroidism - all stable, stopped seeing endo 2 y ago     The following portions of the patient's history were reviewed and updated as appropriate: allergies, current medications, past family history, past medical history, past social history, past surgical history and problem list     Review of Systems   Constitutional: Negative  Respiratory: Negative  Cardiovascular: Negative  Gastrointestinal: Negative  Genitourinary: Negative  Musculoskeletal: Negative  Skin: Negative  Neurological: Negative  Current Outpatient Medications   Medication Sig Dispense Refill    albuterol (PROVENTIL HFA,VENTOLIN HFA) 90 mcg/act inhaler Inhale 2 puffs every 4 (four) hours as needed for wheezing      ascorbic acid (VITAMIN C) 500 mg tablet Take 500 mg by mouth 2 (two) times a day      BREO ELLIPTA 100-25 MCG/INH inhaler INHALE 1 PUFF DAILY RINSE  MOUTH AFTER USE  1 Inhaler 5    FISH OIL-KRILL OIL PO Take by mouth      Lactobacillus (PRIMADOPHILUS PO) Take by mouth        losartan (COZAAR) 50 mg tablet Take 1 tablet (50 mg total) by mouth daily 90 tablet 3    multivitamin (THERAGRAN) TABS Take 1 tablet by mouth daily      Olive Leaf Extract 500 MG CAPS Take by mouth as needed        rosuvastatin (CRESTOR) 10 MG tablet TAKE 1 TABLET DAILY AT  BEDTIME 90 tablet 3     No current facility-administered medications for this visit  Objective:    /80 (BP Location: Right arm, Patient Position: Sitting, Cuff Size: Large)   Pulse 80   Temp 98 3 °F (36 8 °C) (Tympanic)   Resp 18   Ht 5' 10" (1 778 m)   Wt 85 3 kg (188 lb)   BMI 26 98 kg/m²        Physical Exam   Constitutional: She is oriented to person, place, and time  No distress     Cardiovascular: Normal rate, regular rhythm and normal heart sounds  No murmur heard  Pulmonary/Chest: Effort normal and breath sounds normal  No respiratory distress  She has no wheezes  She has no rales  Musculoskeletal: She exhibits no edema or deformity  Neurological: She is alert and oriented to person, place, and time  Psychiatric: She has a normal mood and affect  Assessment/Plan:         Diagnoses and all orders for this visit:    Benign essential hypertension  -     losartan (COZAAR) 50 mg tablet; Take 1 tablet (50 mg total) by mouth daily  -     Comprehensive metabolic panel; Future  -     Lipid panel; Future  -     TSH, 3rd generation; Future    Hyperparathyroidism ([address] )  -     TSH, 3rd generation; Future  -     PTH, intact; Future  -     Vitamin D 25 hydroxy; Future    Moderate persistent asthma without complication    Hypercholesterolemia  -     rosuvastatin (CRESTOR) 10 MG tablet; Take 1 tablet (10 mg total) by mouth daily at bedtime  -     Lipid panel;  Future    Routine medical exam        rto in 6 m                     Kyung Pineda MD

## 2019-10-20 NOTE — PATIENT INSTRUCTIONS
Medicare Preventive Visit Patient Instructions  Thank you for completing your Welcome to Medicare Visit or Medicare Annual Wellness Visit today  Your next wellness visit will be due in one year (10/20/2020)  The screening/preventive services that you may require over the next 5-10 years are detailed below  Some tests may not apply to you based off risk factors and/or age  Screening tests ordered at today's visit but not completed yet may show as past due  Also, please note that scanned in results may not display below  Preventive Screenings:  Service Recommendations Previous Testing/Comments   Colorectal Cancer Screening  * Colonoscopy    * Fecal Occult Blood Test (FOBT)/Fecal Immunochemical Test (FIT)  * Fecal DNA/Cologuard Test  * Flexible Sigmoidoscopy Age: 54-65 years old   Colonoscopy: every 10 years (may be performed more frequently if at higher risk)  OR  FOBT/FIT: every 1 year  OR  Cologuard: every 3 years  OR  Sigmoidoscopy: every 5 years  Screening may be recommended earlier than age 48 if at higher risk for colorectal cancer  Also, an individualized decision between you and your healthcare provider will decide whether screening between the ages of 74-80 would be appropriate  Colonoscopy: 06/27/2014  FOBT/FIT: Not on file  Cologuard: Not on file  Sigmoidoscopy: Not on file         Breast Cancer Screening Age: 36 years old  Frequency: every 1-2 years  Not required if history of left and right mastectomy Mammogram: 07/11/2017       Cervical Cancer Screening Between the ages of 21-29, pap smear recommended once every 3 years  Between the ages of 33-67, can perform pap smear with HPV co-testing every 5 years     Recommendations may differ for women with a history of total hysterectomy, cervical cancer, or abnormal pap smears in past  Pap Smear: Not on file       Hepatitis C Screening Once for adults born between [facility]  More frequently in patients at high risk for Hepatitis C Hep C Antibody: Not on file       Diabetes Screening 1-2 times per year if you're at risk for diabetes or have pre-diabetes Fasting glucose: No results in last 5 years   A1C: No results in last 5 years       Cholesterol Screening Once every 5 years if you don't have a lipid disorder  May order more often based on risk factors  Lipid panel: 03/15/2017         Other Preventive Screenings Covered by Medicare:  1  Abdominal Aortic Aneurysm (AAA) Screening: covered once if your at risk  You're considered to be at risk if you have a family history of AAA  2  Lung Cancer Screening: covers low dose CT scan once per year if you meet all of the following conditions: (1) Age 50-69; (2) No signs or symptoms of lung cancer; (3) Current smoker or have quit smoking within the last 15 years; (4) You have a tobacco smoking history of at least 30 pack years (packs per day multiplied by number of years you smoked); (5) You get a written order from a healthcare provider  3  Glaucoma Screening: covered annually if you're considered high risk: (1) You have diabetes OR (2) Family history of glaucoma OR (3)  aged 48 and older OR (3)  American aged 72 and older  3  Osteoporosis Screening: covered every 2 years if you meet one of the following conditions: (1) You're estrogen deficient and at risk for osteoporosis based off medical history and other findings; (2) Have a vertebral abnormality; (3) On glucocorticoid therapy for more than 3 months; (4) Have primary hyperparathyroidism; (5) On osteoporosis medications and need to assess response to drug therapy  · Last bone density test (DXA Scan): 06/28/2016   5  HIV Screening: covered annually if you're between the age of 15-65  Also covered annually if you are younger than 13 and older than 72 with risk factors for HIV infection  For pregnant patients, it is covered up to 3 times per pregnancy      Immunizations:  Immunization Recommendations   Influenza Vaccine Annual influenza vaccination during flu season is recommended for all persons aged >= 6 months who do not have contraindications   Pneumococcal Vaccine (Prevnar and Pneumovax)  * Prevnar = PCV13  * Pneumovax = PPSV23   Adults 25-60 years old: 1-3 doses may be recommended based on certain risk factors  Adults 72 years old: Prevnar (PCV13) vaccine recommended followed by Pneumovax (PPSV23) vaccine  If already received PPSV23 since turning 65, then PCV13 recommended at least one year after PPSV23 dose  Hepatitis B Vaccine 3 dose series if at intermediate or high risk (ex: diabetes, end stage renal disease, liver disease)   Tetanus (Td) Vaccine - COST NOT COVERED BY MEDICARE PART B Following completion of primary series, a booster dose should be given every 10 years to maintain immunity against tetanus  Td may also be given as tetanus wound prophylaxis  Tdap Vaccine - COST NOT COVERED BY MEDICARE PART B Recommended at least once for all adults  For pregnant patients, recommended with each pregnancy  Shingles Vaccine (Shingrix) - COST NOT COVERED BY MEDICARE PART B  2 shot series recommended in those aged 48 and above     Health Maintenance Due:      Topic Date Due    Hepatitis C Screening  11/16/2017 (Originally 1950)    MAMMOGRAM  10/16/2020 (Originally 7/11/2018)    CRC Screening: Colonoscopy  06/27/2024     Immunizations Due:  There are no preventive care reminders to display for this patient  Advance Directives   What are advance directives? Advance directives are legal documents that state your wishes and plans for medical care  These plans are made ahead of time in case you lose your ability to make decisions for yourself  Advance directives can apply to any medical decision, such as the treatments you want, and if you want to donate organs  What are the types of advance directives? There are many types of advance directives, and each state has rules about how to use them   You may choose a combination of any of the following:  · Living will: This is a written record of the treatment you want  You can also choose which treatments you do not want, which to limit, and which to stop at a certain time  This includes surgery, medicine, IV fluid, and tube feedings  · Durable power of  for healthcare [facility]): This is a written record that states who you want to make healthcare choices for you when you are unable to make them for yourself  This person, called a proxy, is usually a family member or a friend  You may choose more than 1 proxy  · Do not resuscitate (DNR) order:  A DNR order is used in case your heart stops beating or you stop breathing  It is a request not to have certain forms of treatment, such as CPR  A DNR order may be included in other types of advance directives  · Medical directive: This covers the care that you want if you are in a coma, near death, or unable to make decisions for yourself  You can list the treatments you want for each condition  Treatment may include pain medicine, surgery, blood transfusions, dialysis, IV or tube feedings, and a ventilator (breathing machine)  · Values history: This document has questions about your views, beliefs, and how you feel and think about life  This information can help others choose the care that you would choose  Why are advance directives important? An advance directive helps you control your care  Although spoken wishes may be used, it is better to have your wishes written down  Spoken wishes can be misunderstood, or not followed  Treatments may be given even if you do not want them  An advance directive may make it easier for your family to make difficult choices about your care  Weight Management   Why it is important to manage your weight:  Being overweight increases your risk of health conditions such as heart disease, high blood pressure, type 2 diabetes, and certain types of cancer   It can also increase your risk for osteoarthritis, sleep apnea, and other respiratory problems  Aim for a slow, steady weight loss  Even a small amount of weight loss can lower your risk of health problems  How to lose weight safely:  A safe and healthy way to lose weight is to eat fewer calories and get regular exercise  You can lose up about 1 pound a week by decreasing the number of calories you eat by 500 calories each day  Healthy meal plan for weight management:  A healthy meal plan includes a variety of foods, contains fewer calories, and helps you stay healthy  A healthy meal plan includes the following:  · Eat whole-grain foods more often  A healthy meal plan should contain fiber  Fiber is the part of grains, fruits, and vegetables that is not broken down by your body  Whole-grain foods are healthy and provide extra fiber in your diet  Some examples of whole-grain foods are whole-wheat breads and pastas, oatmeal, brown rice, and bulgur  · Eat a variety of vegetables every day  Include dark, leafy greens such as spinach, kale, ata greens, and mustard greens  Eat yellow and orange vegetables such as carrots, sweet potatoes, and winter squash  · Eat a variety of fruits every day  Choose fresh or canned fruit (canned in its own juice or light syrup) instead of juice  Fruit juice has very little or no fiber  · Eat low-fat dairy foods  Drink fat-free (skim) milk or 1% milk  Eat fat-free yogurt and low-fat cottage cheese  Try low-fat cheeses such as mozzarella and other reduced-fat cheeses  · Choose meat and other protein foods that are low in fat  Choose beans or other legumes such as split peas or lentils  Choose fish, skinless poultry (chicken or turkey), or lean cuts of red meat (beef or pork)  Before you cook meat or poultry, cut off any visible fat  · Use less fat and oil  Try baking foods instead of frying them  Add less fat, such as margarine, sour cream, regular salad dressing and mayonnaise to foods  Eat fewer high-fat foods   Some examples of high-fat foods include french fries, doughnuts, ice cream, and cakes  · Eat fewer sweets  Limit foods and drinks that are high in sugar  This includes candy, cookies, regular soda, and sweetened drinks  Exercise:  Exercise at least 30 minutes per day on most days of the week  Some examples of exercise include walking, biking, dancing, and swimming  You can also fit in more physical activity by taking the stairs instead of the elevator or parking farther away from stores  Ask your healthcare provider about the best exercise plan for you  © Copyright [employer] 2018 Information is for End User's use only and may not be sold, redistributed or otherwise used for commercial purposes   All illustrations and images included in CareNotes® are the copyrighted property of A D A M , Inc  or [address] [place], [place] [address]talib

## 2019-10-20 NOTE — PROGRESS NOTES
Assessment and Plan:     Problem List Items Addressed This Visit        Endocrine    Hyperparathyroidism ([address] )    Relevant Orders    TSH, 3rd generation    PTH, intact    Vitamin D 25 hydroxy       Respiratory    Asthma       Cardiovascular and Mediastinum    Benign essential hypertension - Primary    Relevant Medications    losartan (COZAAR) 50 mg tablet    Other Relevant Orders    Comprehensive metabolic panel    Lipid panel    TSH, 3rd generation       Other    Hypercholesterolemia    Relevant Medications    rosuvastatin (CRESTOR) 10 MG tablet    Other Relevant Orders    Lipid panel      Other Visit Diagnoses     Routine medical exam               Preventive health issues were discussed with patient, and age appropriate screening tests were ordered as noted in patient's After Visit Summary  Personalized health advice and appropriate referrals for health education or preventive services given if needed, as noted in patient's After Visit Summary       History of Present Illness:     Patient presents for Medicare Annual Wellness visit    Patient Care Team:  Rajinder Wheatley MD as PCP - General  Rohith Sullivan MD     Problem List:     Patient Active Problem List   Diagnosis    Asthma    Benign essential hypertension    Hypercholesterolemia    Hyperparathyroidism ([address] )    Seasonal allergic rhinitis due to pollen    Localized osteoarthritis of right knee      Past Medical and Surgical History:     Past Medical History:   Diagnosis Date    Abnormal inflammatory bowel disease panel     last assessed: 09/03/14    Asthma     Chest wall contusion     last assessed: 04/21/17    Dermatomycosis 08/02/2011    Hordeolum internum of left eye     last assessed: 06/20/13    Hx of oral aphthous ulcers 08/22/2011    Hypercalcemia 01/11/2011    Hyperlipidemia     Hypertension     Sciatica 05/02/2008    Ulceration of intestine     last assessed: 9/03/14     Past Surgical History:   Procedure Laterality Date    MYRINGOTOMY Bilateral       Family History:     Family History   Problem Relation Age of Onset    No Known Problems Mother         macular degeneration per allscripts    No Known Problems Father       Social History:     Social History     Socioeconomic History    Marital status: /Civil Union     Spouse name: None    Number of children: None    Years of education: None    Highest education level: None   Occupational History    None   Social Needs    Financial resource strain: None    Food insecurity:     Worry: None     Inability: None    Transportation needs:     Medical: None     Non-medical: None   Tobacco Use    Smoking status: Former Smoker     Packs/day: 2 00     Years: 20 00     Pack years: 40 00     Last attempt to quit: 2005     Years since quittin 3    Smokeless tobacco: Never Used   Substance and Sexual Activity    Alcohol use: Yes     Comment: occasional     Drug use: No    Sexual activity: None   Lifestyle    Physical activity:     Days per week: None     Minutes per session: None    Stress: None   Relationships    Social connections:     Talks on phone: None     Gets together: None     Attends Anabaptist service: None     Active member of club or organization: None     Attends meetings of clubs or organizations: None     Relationship status: None    Intimate partner violence:     Fear of current or ex partner: None     Emotionally abused: None     Physically abused: None     Forced sexual activity: None   Other Topics Concern    None   Social History Narrative    None       Medications and Allergies:     Current Outpatient Medications   Medication Sig Dispense Refill    albuterol (PROVENTIL HFA,VENTOLIN HFA) 90 mcg/act inhaler Inhale 2 puffs every 4 (four) hours as needed for wheezing      ascorbic acid (VITAMIN C) 500 mg tablet Take 500 mg by mouth 2 (two) times a day      BREO ELLIPTA 100-25 MCG/INH inhaler INHALE 1 PUFF DAILY RINSE  MOUTH AFTER USE  1 Inhaler 5    FISH OIL-KRILL OIL PO Take by mouth      Lactobacillus (PRIMADOPHILUS PO) Take by mouth        losartan (COZAAR) 50 mg tablet Take 1 tablet (50 mg total) by mouth daily 90 tablet 3    multivitamin (THERAGRAN) TABS Take 1 tablet by mouth daily      Olive Leaf Extract 500 MG CAPS Take by mouth as needed        rosuvastatin (CRESTOR) 10 MG tablet Take 1 tablet (10 mg total) by mouth daily at bedtime 90 tablet 3     No current facility-administered medications for this visit  Allergies   Allergen Reactions    Molds & Smuts Shortness Of Breath     Mold and mildew      Immunizations:     Immunization History   Administered Date(s) Administered    Influenza Quadrivalent Preservative Free 3 years and older IM 02/16/2017      Health Maintenance:         Topic Date Due    Hepatitis C Screening  11/16/2019 (Originally 1950)    MAMMOGRAM  10/16/2020 (Originally 7/11/2018)    CRC Screening: Colonoscopy  06/27/2024     There are no preventive care reminders to display for this patient  Medicare Health Risk Assessment:     /80 (BP Location: Right arm, Patient Position: Sitting, Cuff Size: Large)   Pulse 80   Temp 98 3 °F (36 8 °C) (Tympanic)   Resp 18   Ht 5' 10" (1 778 m)   Wt 85 3 kg (188 lb)   BMI 26 98 kg/m²      Debmaddison is here for her Initial Wellness visit  Health Risk Assessment:   Patient rates overall health as very good  Patient feels that their physical health rating is slightly worse  Eyesight was rated as slightly worse  Hearing was rated as same  Patient feels that their emotional and mental health rating is much better  Pain experienced in the last 7 days has been some  Patient's pain rating has been 1/10  Patient states that she has experienced no weight loss or gain in last 6 months  Depression Screening:   PHQ-2 Score: 0      Fall Risk Screening:    In the past year, patient has experienced: no history of falling in past year      Urinary Incontinence Screening: Patient has not leaked urine accidently in the last six months  Home Safety:  Patient does not have trouble with stairs inside or outside of their home  Patient has working smoke alarms and has working carbon monoxide detector  Home safety hazards include: none  Nutrition:   Current diet is Regular  Medications:   Patient is currently taking over-the-counter supplements  OTC medications include: see medication list  Patient is able to manage medications  Activities of Daily Living (ADLs)/Instrumental Activities of Daily Living (IADLs):   Walk and transfer into and out of bed and chair?: Yes  Dress and groom yourself?: Yes    Bathe or shower yourself?: Yes    Feed yourself? Yes  Do your laundry/housekeeping?: Yes  Manage your money, pay your bills and track your expenses?: Yes  Make your own meals?: Yes    Do your own shopping?: Yes    Previous Hospitalizations:   Any hospitalizations or ED visits within the last 12 months?: No      Advance Care Planning:   Living will: Yes    Durable POA for healthcare:  Yes    Advanced directive: Yes      Cognitive Screening:   Provider or family/friend/caregiver concerned regarding cognition?: No    PREVENTIVE SCREENINGS      Cardiovascular Screening:    General: History Lipid Disorder    Due for: Lipid Panel      Diabetes Screening:       Due for: Blood Glucose      Colorectal Cancer Screening:     General: Screening Current      Breast Cancer Screening:     General: Patient Declines      Cervical Cancer Screening:    General: Screening Not Indicated      Osteoporosis Screening:    General: Patient Declines      Abdominal Aortic Aneurysm (AAA) Screening:        General: Screening Not Indicated      Lung Cancer Screening:     General: Screening Not Indicated      Hepatitis C Screening:    General: Screening Not Indicated      Mj Mark MD

## 2019-10-25 ENCOUNTER — APPOINTMENT (OUTPATIENT)
Dept: LAB | Facility: CLINIC | Age: 69
End: 2019-10-25
Payer: COMMERCIAL

## 2019-10-25 DIAGNOSIS — I10 BENIGN ESSENTIAL HYPERTENSION: ICD-10-CM

## 2019-10-25 DIAGNOSIS — E21.3 HYPERPARATHYROIDISM (HCC): ICD-10-CM

## 2019-10-25 DIAGNOSIS — J45.30 MILD PERSISTENT ASTHMA WITHOUT COMPLICATION: ICD-10-CM

## 2019-10-25 DIAGNOSIS — E78.00 HYPERCHOLESTEROLEMIA: ICD-10-CM

## 2019-10-25 LAB
25(OH)D3 SERPL-MCNC: 29.5 NG/ML (ref 30–100)
ALBUMIN SERPL BCP-MCNC: 4.1 G/DL (ref 3.5–5)
ALP SERPL-CCNC: 69 U/L (ref 46–116)
ALT SERPL W P-5'-P-CCNC: 32 U/L (ref 12–78)
ANION GAP SERPL CALCULATED.3IONS-SCNC: 4 MMOL/L (ref 4–13)
AST SERPL W P-5'-P-CCNC: 19 U/L (ref 5–45)
BILIRUB SERPL-MCNC: 0.37 MG/DL (ref 0.2–1)
BUN SERPL-MCNC: 13 MG/DL (ref 5–25)
CALCIUM ALBUM COR SERPL-MCNC: 10.6 MG/DL (ref 8.3–10.1)
CALCIUM SERPL-MCNC: 10.7 MG/DL (ref 8.3–10.1)
CHLORIDE SERPL-SCNC: 108 MMOL/L (ref 100–108)
CHOLEST SERPL-MCNC: 174 MG/DL (ref 50–200)
CO2 SERPL-SCNC: 27 MMOL/L (ref 21–32)
CREAT SERPL-MCNC: 0.7 MG/DL (ref 0.6–1.3)
GFR SERPL CREATININE-BSD FRML MDRD: 90 ML/MIN/1.73SQ M
GLUCOSE P FAST SERPL-MCNC: 91 MG/DL (ref 65–99)
HDLC SERPL-MCNC: 88 MG/DL
LDLC SERPL CALC-MCNC: 77 MG/DL (ref 0–100)
NONHDLC SERPL-MCNC: 86 MG/DL
POTASSIUM SERPL-SCNC: 4.5 MMOL/L (ref 3.5–5.3)
PROT SERPL-MCNC: 7.3 G/DL (ref 6.4–8.2)
PTH-INTACT SERPL-MCNC: 99.6 PG/ML (ref 18.4–80.1)
SODIUM SERPL-SCNC: 139 MMOL/L (ref 136–145)
TRIGL SERPL-MCNC: 47 MG/DL
TSH SERPL DL<=0.05 MIU/L-ACNC: 4.37 UIU/ML (ref 0.36–3.74)

## 2019-10-25 PROCEDURE — 83970 ASSAY OF PARATHORMONE: CPT

## 2019-10-25 PROCEDURE — 80061 LIPID PANEL: CPT

## 2019-10-25 PROCEDURE — 80053 COMPREHEN METABOLIC PANEL: CPT

## 2019-10-25 PROCEDURE — 84443 ASSAY THYROID STIM HORMONE: CPT

## 2019-10-25 PROCEDURE — 82306 VITAMIN D 25 HYDROXY: CPT

## 2019-10-25 PROCEDURE — 36415 COLL VENOUS BLD VENIPUNCTURE: CPT

## 2019-10-28 DIAGNOSIS — E21.3 HYPERPARATHYROIDISM (HCC): Primary | ICD-10-CM

## 2019-10-28 RX ORDER — FLUTICASONE FUROATE AND VILANTEROL TRIFENATATE 100; 25 UG/1; UG/1
POWDER RESPIRATORY (INHALATION)
Qty: 180 EACH | Refills: 6 | Status: SHIPPED | OUTPATIENT
Start: 2019-10-28 | End: 2020-11-28

## 2019-11-21 ENCOUNTER — OFFICE VISIT (OUTPATIENT)
Dept: OTOLARYNGOLOGY | Facility: CLINIC | Age: 69
End: 2019-11-21
Payer: COMMERCIAL

## 2019-11-21 ENCOUNTER — HOSPITAL ENCOUNTER (OUTPATIENT)
Dept: RADIOLOGY | Facility: CLINIC | Age: 69
Discharge: HOME | End: 2019-11-21
Payer: COMMERCIAL

## 2019-11-21 VITALS
HEIGHT: 70 IN | SYSTOLIC BLOOD PRESSURE: 114 MMHG | BODY MASS INDEX: 26.63 KG/M2 | DIASTOLIC BLOOD PRESSURE: 66 MMHG | TEMPERATURE: 97.8 F | WEIGHT: 186 LBS | HEART RATE: 76 BPM

## 2019-11-21 DIAGNOSIS — E21.3 HYPERPARATHYROIDISM (HCC): ICD-10-CM

## 2019-11-21 DIAGNOSIS — H69.81 DYSFUNCTION OF RIGHT EUSTACHIAN TUBE: Primary | ICD-10-CM

## 2019-11-21 PROCEDURE — 76536 US EXAM OF HEAD AND NECK: CPT

## 2019-11-21 PROCEDURE — 99213 OFFICE O/P EST LOW 20 MIN: CPT | Performed by: OTOLARYNGOLOGY

## 2019-11-21 NOTE — PROGRESS NOTES
Otolaryngology-- Head and Neck Surgery Follow up visit    CC: right ETD      Time interval of problem since last visit:  6 months    Pertinent interval elements of the history:  She reports no ear issues over the past 6 months, since placement of her RMT  Denies hearing loss, aural fullness, otorrhea or popping  She has been maintaining strict dry ear precautions, which has been helpful      Review of any relevant imaging:      Interval Review of systems:  General: no weight change, normal energy  CV: no palpitations or chest pain  Pulm: no shortness of breath    Interval Social History:  Social History     Socioeconomic History    Marital status: /Civil Union     Spouse name: Not on file    Number of children: Not on file    Years of education: Not on file    Highest education level: Not on file   Occupational History    Not on file   Social Needs    Financial resource strain: Not on file    Food insecurity:     Worry: Not on file     Inability: Not on file    Transportation needs:     Medical: Not on file     Non-medical: Not on file   Tobacco Use    Smoking status: Former Smoker     Packs/day: 2 00     Years: 20 00     Pack years: 40 00     Last attempt to quit: 2005     Years since quittin 4    Smokeless tobacco: Never Used   Substance and Sexual Activity    Alcohol use: Yes     Comment: occasional     Drug use: No    Sexual activity: Not on file   Lifestyle    Physical activity:     Days per week: Not on file     Minutes per session: Not on file    Stress: Not on file   Relationships    Social connections:     Talks on phone: Not on file     Gets together: Not on file     Attends Tenriism service: Not on file     Active member of club or organization: Not on file     Attends meetings of clubs or organizations: Not on file     Relationship status: Not on file    Intimate partner violence:     Fear of current or ex partner: Not on file     Emotionally abused: Not on file Physically abused: Not on file     Forced sexual activity: Not on file   Other Topics Concern    Not on file   Social History Narrative    Not on file       Interval Physical Examination:  /66 (BP Location: Left arm, Patient Position: Sitting, Cuff Size: Adult)   Pulse 76   Temp 97 8 °F (36 6 °C) (Oral)   Ht 5' 10" (1 778 m)   Wt 84 4 kg (186 lb)   BMI 26 69 kg/m²   NAD  AD: TIPP    Interval endoscopy:          Assessment:  1  Dysfunction of right eustachian tube         Plan:  1  Doing well with stable RMT  No infection or effusion  Maintain dry ear precautions and follow up in 6 months

## 2019-11-25 ENCOUNTER — TELEPHONE (OUTPATIENT)
Dept: FAMILY MEDICINE CLINIC | Facility: CLINIC | Age: 69
End: 2019-11-25

## 2019-11-25 NOTE — TELEPHONE ENCOUNTER
----- Message from Manish Amaro MD sent at 11/22/2019 12:43 PM EST -----  Pl, advise pt -  Thyroid US showed several small nodules - no needs for biopsy or f/u US b/o small size

## 2020-01-14 ENCOUNTER — APPOINTMENT (OUTPATIENT)
Dept: RADIOLOGY | Facility: CLINIC | Age: 70
End: 2020-01-14
Payer: COMMERCIAL

## 2020-01-14 ENCOUNTER — OFFICE VISIT (OUTPATIENT)
Dept: URGENT CARE | Facility: CLINIC | Age: 70
End: 2020-01-14
Payer: COMMERCIAL

## 2020-01-14 VITALS
RESPIRATION RATE: 16 BRPM | TEMPERATURE: 102.5 F | OXYGEN SATURATION: 95 % | HEART RATE: 100 BPM | DIASTOLIC BLOOD PRESSURE: 76 MMHG | SYSTOLIC BLOOD PRESSURE: 120 MMHG

## 2020-01-14 DIAGNOSIS — R05.9 COUGH: ICD-10-CM

## 2020-01-14 DIAGNOSIS — R05.9 COUGH: Primary | ICD-10-CM

## 2020-01-14 PROCEDURE — 1160F RVW MEDS BY RX/DR IN RCRD: CPT | Performed by: PHYSICIAN ASSISTANT

## 2020-01-14 PROCEDURE — 3074F SYST BP LT 130 MM HG: CPT | Performed by: PHYSICIAN ASSISTANT

## 2020-01-14 PROCEDURE — 71046 X-RAY EXAM CHEST 2 VIEWS: CPT

## 2020-01-14 PROCEDURE — 3079F DIAST BP 80-89 MM HG: CPT | Performed by: PHYSICIAN ASSISTANT

## 2020-01-14 PROCEDURE — 99213 OFFICE O/P EST LOW 20 MIN: CPT | Performed by: PHYSICIAN ASSISTANT

## 2020-01-14 PROCEDURE — 1036F TOBACCO NON-USER: CPT | Performed by: PHYSICIAN ASSISTANT

## 2020-01-14 RX ORDER — LOSARTAN POTASSIUM 50 MG/1
TABLET ORAL
COMMUNITY
Start: 2020-01-09 | End: 2020-08-22

## 2020-01-14 RX ORDER — IPRATROPIUM BROMIDE AND ALBUTEROL SULFATE 2.5; .5 MG/3ML; MG/3ML
3 SOLUTION RESPIRATORY (INHALATION) ONCE
Status: COMPLETED | OUTPATIENT
Start: 2020-01-14 | End: 2020-01-14

## 2020-01-14 RX ORDER — PREDNISONE 20 MG/1
40 TABLET ORAL DAILY
Qty: 10 TABLET | Refills: 0 | Status: SHIPPED | OUTPATIENT
Start: 2020-01-14 | End: 2020-01-19

## 2020-01-14 RX ORDER — AZITHROMYCIN 250 MG/1
TABLET, FILM COATED ORAL
Qty: 6 TABLET | Refills: 0 | Status: SHIPPED | OUTPATIENT
Start: 2020-01-14 | End: 2020-01-18

## 2020-01-14 RX ORDER — IPRATROPIUM BROMIDE AND ALBUTEROL SULFATE 2.5; .5 MG/3ML; MG/3ML
3 SOLUTION RESPIRATORY (INHALATION)
Status: DISCONTINUED | OUTPATIENT
Start: 2020-01-14 | End: 2020-01-14

## 2020-01-14 RX ORDER — ALBUTEROL SULFATE 90 UG/1
2 AEROSOL, METERED RESPIRATORY (INHALATION) EVERY 6 HOURS PRN
Qty: 1 INHALER | Refills: 0 | Status: SHIPPED | OUTPATIENT
Start: 2020-01-14 | End: 2021-03-29

## 2020-01-14 RX ADMIN — IPRATROPIUM BROMIDE AND ALBUTEROL SULFATE 3 ML: 2.5; .5 SOLUTION RESPIRATORY (INHALATION) at 14:16

## 2020-01-14 NOTE — PATIENT INSTRUCTIONS
CAP:  -Xray showed mild hyperinflation with streaky densities at the lung bases likely represent subsegmental atelectasis  -Duoneb in the office today immediately improved the patients symptoms  -Azithromycin 250mg PO taken as directed  Take with food and a probiotic    -Prednisone 40mg PO QD x 5 days  Take with meals    -Albuterol inhaler for wheezing, chest tightness or dyspnea  -Use a humidifier next to your bed  Take steam showers  -You can take Advil or Tylenol for fever or pain  -Stay very well hydrated and rest   -Follow up with your PCP in the next 2-3 days  If your sx worsen go to the ED

## 2020-01-14 NOTE — PROGRESS NOTES
[facility]        NAME: Charley Winters is a 81 y o  female  : 1950    MRN: [id number]  DATE: 2020  TIME: 2:10 PM    Assessment and Plan   Cough [R05]  1  Cough  XR chest pa & lateral    ipratropium-albuterol (DUO-NEB) 0 5-2 5 mg/3 mL inhalation solution 3 mL    predniSONE 20 mg tablet    azithromycin (ZITHROMAX) 250 mg tablet    albuterol (PROVENTIL HFA,VENTOLIN HFA) 90 mcg/act inhaler    DISCONTINUED: ipratropium-albuterol (DUO-NEB) 0 5-2 5 mg/3 mL inhalation solution 3 mL         Patient Instructions   CAP with reactive airway disease:   -Xray showed mild hyperinflation with streaky densities at the lung bases likely represent subsegmental atelectasis  -Duoneb in the office today immediately improved the patients symptoms  -Azithromycin 250mg PO taken as directed  Take with food and a probiotic    -Prednisone 40mg PO QD x 5 days  Take with meals    -Albuterol inhaler for wheezing, chest tightness or dyspnea  -Use a humidifier next to your bed  Take steam showers  -You can take Advil or Tylenol for fever or pain  -Stay very well hydrated and rest   -Follow up with your PCP in the next 2-3 days  If your sx worsen go to the ED  Follow up with PCP in 3-5 days  Proceed to  ER if symptoms worsen  Chief Complaint     Chief Complaint   Patient presents with   Jeannett Situ Like Symptoms     pt presents with fatigue, cough, sinus congestion, discomfort with deep breath; started on          History of Present Illness       The patient presents today for a two day hx of dry cough, fatigue, congestion and chest tightness  She states that her sx began with fatigue and increased somnolence  She states that today she feels that she "cannot breath" and has chest tightness and dyspnea  She states that she attempted use of the albuterol inhaler which did not relieve her sx  She notes a hx of asthma  She denies dizziness, weakness, cp, palpitations   She denies sick contacts or recent travel  She did not have her flu vaccination  Review of Systems   Review of Systems   Constitutional: Positive for fatigue  Negative for activity change, appetite change, chills, diaphoresis and fever  HENT: Positive for congestion, postnasal drip and rhinorrhea  Negative for ear discharge, ear pain, facial swelling, hearing loss, sinus pressure, sinus pain, sneezing, sore throat, tinnitus, trouble swallowing and voice change  Respiratory: Positive for cough, chest tightness and shortness of breath  Negative for wheezing and stridor  Cardiovascular: Negative for chest pain, palpitations and leg swelling  Gastrointestinal: Negative for abdominal pain, diarrhea, nausea and vomiting  Musculoskeletal: Negative for arthralgias, joint swelling, myalgias, neck pain and neck stiffness  Skin: Negative for rash  Allergic/Immunologic: Negative for immunocompromised state  Neurological: Negative for dizziness, weakness, light-headedness, numbness and headaches  Hematological: Negative for adenopathy  Current Medications       Current Outpatient Medications:     albuterol (PROVENTIL HFA,VENTOLIN HFA) 90 mcg/act inhaler, Inhale 2 puffs every 4 (four) hours as needed for wheezing, Disp: , Rfl:     albuterol (PROVENTIL HFA,VENTOLIN HFA) 90 mcg/act inhaler, Inhale 2 puffs every 6 (six) hours as needed for wheezing or shortness of breath, Disp: 1 Inhaler, Rfl: 0    ascorbic acid (VITAMIN C) 500 mg tablet, Take 500 mg by mouth 2 (two) times a day, Disp: , Rfl:     BREO ELLIPTA 100-25 MCG/INH inhaler, USE 1 INHALATION DAILY    RINSE MOUTH AFTER USE , Disp: 180 each, Rfl: 6    FISH OIL-KRILL OIL PO, Take by mouth, Disp: , Rfl:     ipratropium-albuterol (COMBIVENT RESPIMAT) inhaler, Inhale 1 puff 4 (four) times a day, Disp: 1 Inhaler, Rfl: 3    Lactobacillus (PRIMADOPHILUS PO), Take by mouth  , Disp: , Rfl:     losartan (COZAAR) 50 mg tablet, , Disp: , Rfl:     multivitamin (THERAGRAN) TABS, Take 1 tablet by mouth daily, Disp: , Rfl:     Olive Leaf Extract 500 MG CAPS, Take by mouth as needed  , Disp: , Rfl:     predniSONE 20 mg tablet, Take 3 tabs daily for 2 days, then 2 tabs daily for 2 days, then 1 tab daily for 2 days, Disp: 12 tablet, Rfl: 0    rosuvastatin (CRESTOR) 10 MG tablet, Take 1 tablet (10 mg total) by mouth daily at bedtime, Disp: 90 tablet, Rfl: 3    Current Allergies     Allergies as of 01/14/2020 - Reviewed 01/14/2020   Allergen Reaction Noted    Molds & smuts Shortness Of Breath 07/05/2018            The following portions of the patient's history were reviewed and updated as appropriate: allergies, current medications, past family history, past medical history, past social history, past surgical history and problem list      Past Medical History:   Diagnosis Date    Abnormal inflammatory bowel disease panel     last assessed: 09/03/14    Asthma     Chest wall contusion     last assessed: 04/21/17    Dermatomycosis 08/02/2011    Hordeolum internum of left eye     last assessed: 06/20/13    Hx of oral aphthous ulcers 08/22/2011    Hypercalcemia 01/11/2011    Hyperlipidemia     Hypertension     Sciatica 05/02/2008    Ulceration of intestine     last assessed: 9/03/14       Past Surgical History:   Procedure Laterality Date    MYRINGOTOMY Bilateral        Family History   Problem Relation Age of Onset    No Known Problems Mother         macular degeneration per allscripts    No Known Problems Father          Medications have been verified  Objective   /76   Pulse 100   Temp (!) 102 5 °F (39 2 °C)   Resp 16   SpO2 95%        Physical Exam     Physical Exam   Constitutional: She is oriented to person, place, and time  She appears well-developed and well-nourished  No distress  HENT:   Head: Normocephalic and atraumatic     Right Ear: Hearing, tympanic membrane, external ear and ear canal normal    Left Ear: Hearing, tympanic membrane, external ear and ear canal normal    Nose: Nose normal  No mucosal edema or rhinorrhea  Right sinus exhibits no maxillary sinus tenderness and no frontal sinus tenderness  Left sinus exhibits no maxillary sinus tenderness and no frontal sinus tenderness  Mouth/Throat: Uvula is midline, oropharynx is clear and moist and mucous membranes are normal  No uvula swelling  No oropharyngeal exudate, posterior oropharyngeal edema, posterior oropharyngeal erythema or tonsillar abscesses  Neck: Normal range of motion  Neck supple  Cardiovascular: Normal rate, regular rhythm, S1 normal and S2 normal  Exam reveals no gallop, no S3, no S4, no distant heart sounds and no friction rub  No murmur heard  Pulmonary/Chest: No accessory muscle usage or stridor  No tachypnea and no bradypnea  No respiratory distress  She has decreased breath sounds (decreased breath sounds and "tightness")  She has wheezes (diffuse mild wheezing )  She has no rhonchi  She has no rales  Lymphadenopathy:     She has no cervical adenopathy  Neurological: She is alert and oriented to person, place, and time  Skin: She is not diaphoretic  Psychiatric: She has a normal mood and affect  Her behavior is normal    Nursing note and vitals reviewed

## 2020-01-15 ENCOUNTER — TELEPHONE (OUTPATIENT)
Dept: URGENT CARE | Facility: CLINIC | Age: 70
End: 2020-01-15

## 2020-01-15 NOTE — TELEPHONE ENCOUNTER
Resulted final XR report  Advised to complete the antibiotic as previously instructed  Encouraged routine deep breathing to open airways  Advised to f/u with PCP within the next week for a recheck, sooner if needed  She was agreeable and expressed gratitude for Fabricio Love, [facility] noting she is feeling significantly better today  All questions answered  Precautions given

## 2020-01-21 ENCOUNTER — OFFICE VISIT (OUTPATIENT)
Dept: FAMILY MEDICINE CLINIC | Facility: CLINIC | Age: 70
End: 2020-01-21
Payer: COMMERCIAL

## 2020-01-21 VITALS
BODY MASS INDEX: 25.34 KG/M2 | RESPIRATION RATE: 16 BRPM | SYSTOLIC BLOOD PRESSURE: 122 MMHG | TEMPERATURE: 97.9 F | DIASTOLIC BLOOD PRESSURE: 80 MMHG | WEIGHT: 177 LBS | HEART RATE: 84 BPM | HEIGHT: 70 IN | OXYGEN SATURATION: 98 %

## 2020-01-21 DIAGNOSIS — J45.31 MILD PERSISTENT REACTIVE AIRWAY DISEASE WITH ACUTE EXACERBATION: ICD-10-CM

## 2020-01-21 DIAGNOSIS — J06.9 URI, ACUTE: Primary | ICD-10-CM

## 2020-01-21 DIAGNOSIS — Z20.828 EXPOSURE TO RESPIRATORY SYNCYTIAL VIRUS (RSV): ICD-10-CM

## 2020-01-21 PROCEDURE — 1160F RVW MEDS BY RX/DR IN RCRD: CPT | Performed by: NURSE PRACTITIONER

## 2020-01-21 PROCEDURE — 3008F BODY MASS INDEX DOCD: CPT | Performed by: NURSE PRACTITIONER

## 2020-01-21 PROCEDURE — 99213 OFFICE O/P EST LOW 20 MIN: CPT | Performed by: NURSE PRACTITIONER

## 2020-01-21 RX ORDER — AZITHROMYCIN 250 MG/1
TABLET, FILM COATED ORAL
Qty: 6 TABLET | Refills: 0 | Status: SHIPPED | OUTPATIENT
Start: 2020-01-21 | End: 2020-01-25

## 2020-01-21 RX ORDER — PREDNISONE 20 MG/1
TABLET ORAL
Qty: 12 TABLET | Refills: 0 | Status: SHIPPED | OUTPATIENT
Start: 2020-01-21 | End: 2021-03-29

## 2020-01-21 NOTE — PROGRESS NOTES
Assessment/Plan:    1  URI, acute  -     predniSONE 20 mg tablet; Take 3 tabs daily for 2 days, then 2 tabs daily for 2 days, then 1 tab daily for 2 days  -     azithromycin (ZITHROMAX) 250 mg tablet; Take 2 tablets today then 1 tablet daily x 4 days  -     ipratropium-albuterol (COMBIVENT RESPIMAT) inhaler; Inhale 1 puff 4 (four) times a day    2  Exposure to respiratory syncytial virus (RSV)  -     predniSONE 20 mg tablet; Take 3 tabs daily for 2 days, then 2 tabs daily for 2 days, then 1 tab daily for 2 days  -     azithromycin (ZITHROMAX) 250 mg tablet; Take 2 tablets today then 1 tablet daily x 4 days  -     ipratropium-albuterol (COMBIVENT RESPIMAT) inhaler; Inhale 1 puff 4 (four) times a day    3  Mild persistent reactive airway disease with acute exacerbation  -     predniSONE 20 mg tablet; Take 3 tabs daily for 2 days, then 2 tabs daily for 2 days, then 1 tab daily for 2 days  -     azithromycin (ZITHROMAX) 250 mg tablet; Take 2 tablets today then 1 tablet daily x 4 days  -     ipratropium-albuterol (COMBIVENT RESPIMAT) inhaler; Inhale 1 puff 4 (four) times a day        Suspect RAD in asthmatic patient with RSV infection  Cont supportive care  See orders   Patient to call thurs with update  Advised to call sooner if sxs progress for the worse    Patient Instructions     Upper Respiratory Infection   AMBULATORY CARE:   An upper respiratory infection  is also called a common cold  It can affect your nose, throat, ears, and sinuses  Common signs and symptoms include the following:  Cold symptoms are usually worst for the first 3 to 5 days  You may have any of the following:  · Runny or stuffy nose    · Sneezing and coughing    · Sore throat or hoarseness    · Red, watery, and sore eyes    · Fatigue     · Chills and fever    · Headache, body aches, or sore muscles  Seek care immediately if:   · You have chest pain or trouble breathing      Contact your healthcare provider if:   · You have a fever over 102ºF (39°C)  · Your sore throat gets worse or you see white or yellow spots in your throat  · Your symptoms get worse after 3 to 5 days or your cold is not better in 14 days  · You have a rash anywhere on your skin  · You have large, tender lumps in your neck  · You have thick, green or yellow drainage from your nose  · You cough up thick yellow, green, or bloody mucus  · You have vomiting for more than 24 hours and cannot keep fluids down  · You have a bad earache  · You have questions or concerns about your condition or care  Treatment for a cold: There is no cure for the common cold  Colds are caused by viruses and do not get better with antibiotics  Most people get better in 7 to 14 days  You may continue to cough for 2 to 3 weeks  The following may help decrease your symptoms:  · Decongestants  help reduce nasal congestion and help you breathe more easily  If you take decongestant pills, they may make you feel restless or not able to sleep  Do not use decongestant sprays for more than a few days  · Cough suppressants  help reduce coughing  Ask your healthcare provider which type of cough medicine is best for you  · NSAIDs , such as ibuprofen, help decrease swelling, pain, and fever  NSAIDs can cause stomach bleeding or kidney problems in certain people  If you take blood thinner medicine, always ask your healthcare provider if NSAIDs are safe for you  Always read the medicine label and follow directions  · Acetaminophen  decreases pain and fever  It is available without a doctor's order  Ask how much to take and how often to take it  Follow directions  Read the labels of all other medicines you are using to see if they also contain acetaminophen, or ask your doctor or pharmacist  Acetaminophen can cause liver damage if not taken correctly  Do not use more than 4 grams (4,000 milligrams) total of acetaminophen in one day  Manage your cold:   · Rest as much as possible    Slowly start to do more each day  · Drink more liquids as directed  Liquids will help thin and loosen mucus so you can cough it up  Liquids will also help prevent dehydration  Liquids that help prevent dehydration include water, fruit juice, and broth  Do not drink liquids that contain caffeine  Caffeine can increase your risk for dehydration  Ask your healthcare provider how much liquid to drink each day  · Soothe a sore throat  Gargle with warm salt water  This helps your sore throat feel better  Make salt water by dissolving ¼ teaspoon salt in 1 cup warm water  You may also suck on hard candy or throat lozenges  You may use a sore throat spray  · Use a humidifier or vaporizer  Use a cool mist humidifier or a vaporizer to increase air moisture in your home  This may make it easier for you to breathe and help decrease your cough  · Use saline nasal drops as directed  These help relieve congestion  · Apply petroleum-based jelly around the outside of your nostrils  This can decrease irritation from blowing your nose  · Do not smoke  Nicotine and other chemicals in cigarettes and cigars can make your symptoms worse  They can also cause infections such as bronchitis or pneumonia  Ask your healthcare provider for information if you currently smoke and need help to quit  E-cigarettes or smokeless tobacco still contain nicotine  Talk to your healthcare provider before you use these products  Prevent spreading your cold to others:   · Try to stay away from other people during the first 2 to 3 days of your cold when it is more easily spread  · Do not share food or drinks  · Do not share hand towels with household members  · Wash your hands often, especially after you blow your nose  Turn away from other people and cover your mouth and nose with a tissue when you sneeze or cough         Follow up with your healthcare provider as directed:  Write down your questions so you remember to ask them during your visits  © 2017 [address] Information is for End User's use only and may not be sold, redistributed or otherwise used for commercial purposes  All illustrations and images included in CareNotes® are the copyrighted property of [employer]  or [employer]  The above information is an  only  It is not intended as medical advice for individual conditions or treatments  Talk to your doctor, nurse or pharmacist before following any medical regimen to see if it is safe and effective for you  Return in about 1 week (around 1/28/2020)  Subjective:      Patient ID: Neetu Dash is a 81 y o  female  Chief Complaint   Patient presents with    Cough     congestion f/u UC from last Tuesday given z-pack and prednisone       URI    This is a new (patient seen at [facility] ([address] [facility] in [facility]) last tuesday for flu like illness  She was treated with zpack and pred  She reports very slow improvement   tested POSITIVE for RSV over weekend  His symptoms are very similar to patient's  ) problem  The current episode started 1 to 4 weeks ago  The maximum temperature recorded prior to her arrival was 100 4 - 100 9 F  Associated symptoms include congestion, coughing, headaches and wheezing  Pertinent negatives include no ear pain or sinus pain  Associated symptoms comments: Fatigue    She has tried inhaler use and increased fluids (mucinex  short burst of pred, zpack) for the symptoms  The treatment provided mild relief  The following portions of the patient's history were reviewed and updated as appropriate: allergies, current medications, past family history, past medical history, past social history, past surgical history and problem list     Review of Systems   Constitutional: Positive for fatigue and fever  HENT: Positive for congestion and postnasal drip  Negative for ear pain, sinus pain and trouble swallowing      Respiratory: Positive for cough and wheezing  Negative for shortness of breath  Cardiovascular: Negative  Gastrointestinal: Negative  Neurological: Positive for weakness and headaches  Negative for dizziness, tremors and syncope  Current Outpatient Medications   Medication Sig Dispense Refill    albuterol (PROVENTIL HFA,VENTOLIN HFA) 90 mcg/act inhaler Inhale 2 puffs every 6 (six) hours as needed for wheezing or shortness of breath 1 Inhaler 0    ascorbic acid (VITAMIN C) 500 mg tablet Take 500 mg by mouth 2 (two) times a day      BREO ELLIPTA 100-25 MCG/INH inhaler USE 1 INHALATION DAILY  RINSE MOUTH AFTER USE  180 each 6    FISH OIL-KRILL OIL PO Take by mouth      Lactobacillus (PRIMADOPHILUS PO) Take by mouth        losartan (COZAAR) 50 mg tablet       multivitamin (THERAGRAN) TABS Take 1 tablet by mouth daily      Olive Leaf Extract 500 MG CAPS Take by mouth as needed        rosuvastatin (CRESTOR) 10 MG tablet Take 1 tablet (10 mg total) by mouth daily at bedtime 90 tablet 3    albuterol (PROVENTIL HFA,VENTOLIN HFA) 90 mcg/act inhaler Inhale 2 puffs every 4 (four) hours as needed for wheezing      azithromycin (ZITHROMAX) 250 mg tablet Take 2 tablets today then 1 tablet daily x 4 days 6 tablet 0    ipratropium-albuterol (COMBIVENT RESPIMAT) inhaler Inhale 1 puff 4 (four) times a day 1 Inhaler 3    predniSONE 20 mg tablet Take 3 tabs daily for 2 days, then 2 tabs daily for 2 days, then 1 tab daily for 2 days 12 tablet 0     No current facility-administered medications for this visit  Objective:    /80 (BP Location: Left arm, Patient Position: Sitting, Cuff Size: Adult)   Pulse 84   Temp 97 9 °F (36 6 °C) (Tympanic)   Resp 16   Ht 5' 10" (1 778 m)   Wt 80 3 kg (177 lb)   SpO2 98%   BMI 25 40 kg/m²        Physical Exam   Constitutional: She is oriented to person, place, and time  Vital signs are normal  She appears well-developed and well-nourished  She appears ill  No distress     HENT: Head: Normocephalic and atraumatic  TMs dull    Post nasal drip    Nasal mucosa red, swollen     Cardiovascular: Normal rate, regular rhythm, normal heart sounds and intact distal pulses  Pulmonary/Chest: Effort normal  No stridor  No respiratory distress  She has wheezes  She has no rhonchi  Abdominal: Soft  Bowel sounds are normal  There is no tenderness  Musculoskeletal: She exhibits no edema  Lymphadenopathy:     She has no cervical adenopathy  Neurological: She is alert and oriented to person, place, and time  Coordination normal    Skin: Skin is warm and dry  Capillary refill takes less than 2 seconds  No pallor  Nursing note and vitals reviewed               Jose Meza, [address]

## 2020-01-21 NOTE — PATIENT INSTRUCTIONS
Upper Respiratory Infection   AMBULATORY CARE:   An upper respiratory infection  is also called a common cold  It can affect your nose, throat, ears, and sinuses  Common signs and symptoms include the following:  Cold symptoms are usually worst for the first 3 to 5 days  You may have any of the following:  · Runny or stuffy nose    · Sneezing and coughing    · Sore throat or hoarseness    · Red, watery, and sore eyes    · Fatigue     · Chills and fever    · Headache, body aches, or sore muscles  Seek care immediately if:   · You have chest pain or trouble breathing  Contact your healthcare provider if:   · You have a fever over 102ºF (39°C)  · Your sore throat gets worse or you see white or yellow spots in your throat  · Your symptoms get worse after 3 to 5 days or your cold is not better in 14 days  · You have a rash anywhere on your skin  · You have large, tender lumps in your neck  · You have thick, green or yellow drainage from your nose  · You cough up thick yellow, green, or bloody mucus  · You have vomiting for more than 24 hours and cannot keep fluids down  · You have a bad earache  · You have questions or concerns about your condition or care  Treatment for a cold: There is no cure for the common cold  Colds are caused by viruses and do not get better with antibiotics  Most people get better in 7 to 14 days  You may continue to cough for 2 to 3 weeks  The following may help decrease your symptoms:  · Decongestants  help reduce nasal congestion and help you breathe more easily  If you take decongestant pills, they may make you feel restless or not able to sleep  Do not use decongestant sprays for more than a few days  · Cough suppressants  help reduce coughing  Ask your healthcare provider which type of cough medicine is best for you  · NSAIDs , such as ibuprofen, help decrease swelling, pain, and fever   NSAIDs can cause stomach bleeding or kidney problems in certain people  If you take blood thinner medicine, always ask your healthcare provider if NSAIDs are safe for you  Always read the medicine label and follow directions  · Acetaminophen  decreases pain and fever  It is available without a doctor's order  Ask how much to take and how often to take it  Follow directions  Read the labels of all other medicines you are using to see if they also contain acetaminophen, or ask your doctor or pharmacist  Acetaminophen can cause liver damage if not taken correctly  Do not use more than 4 grams (4,000 milligrams) total of acetaminophen in one day  Manage your cold:   · Rest as much as possible  Slowly start to do more each day  · Drink more liquids as directed  Liquids will help thin and loosen mucus so you can cough it up  Liquids will also help prevent dehydration  Liquids that help prevent dehydration include water, fruit juice, and broth  Do not drink liquids that contain caffeine  Caffeine can increase your risk for dehydration  Ask your healthcare provider how much liquid to drink each day  · Soothe a sore throat  Gargle with warm salt water  This helps your sore throat feel better  Make salt water by dissolving ¼ teaspoon salt in 1 cup warm water  You may also suck on hard candy or throat lozenges  You may use a sore throat spray  · Use a humidifier or vaporizer  Use a cool mist humidifier or a vaporizer to increase air moisture in your home  This may make it easier for you to breathe and help decrease your cough  · Use saline nasal drops as directed  These help relieve congestion  · Apply petroleum-based jelly around the outside of your nostrils  This can decrease irritation from blowing your nose  · Do not smoke  Nicotine and other chemicals in cigarettes and cigars can make your symptoms worse  They can also cause infections such as bronchitis or pneumonia   Ask your healthcare provider for information if you currently smoke and need help to quit  E-cigarettes or smokeless tobacco still contain nicotine  Talk to your healthcare provider before you use these products  Prevent spreading your cold to others:   · Try to stay away from other people during the first 2 to 3 days of your cold when it is more easily spread  · Do not share food or drinks  · Do not share hand towels with household members  · Wash your hands often, especially after you blow your nose  Turn away from other people and cover your mouth and nose with a tissue when you sneeze or cough  Follow up with your healthcare provider as directed:  Write down your questions so you remember to ask them during your visits  © 2017 [employer] [facility] [employer] Information is for End User's use only and may not be sold, redistributed or otherwise used for commercial purposes  All illustrations and images included in CareNotes® are the copyrighted property of [employer]  or [employer]i Ramos  The above information is an  only  It is not intended as medical advice for individual conditions or treatments  Talk to your doctor, nurse or pharmacist before following any medical regimen to see if it is safe and effective for you

## 2020-06-06 ENCOUNTER — OFFICE VISIT (OUTPATIENT)
Dept: URGENT CARE | Facility: CLINIC | Age: 70
End: 2020-06-06
Payer: COMMERCIAL

## 2020-06-06 ENCOUNTER — APPOINTMENT (OUTPATIENT)
Dept: RADIOLOGY | Facility: CLINIC | Age: 70
End: 2020-06-06
Payer: COMMERCIAL

## 2020-06-06 VITALS
WEIGHT: 177 LBS | DIASTOLIC BLOOD PRESSURE: 70 MMHG | OXYGEN SATURATION: 97 % | TEMPERATURE: 98 F | SYSTOLIC BLOOD PRESSURE: 129 MMHG | HEART RATE: 84 BPM | HEIGHT: 70 IN | BODY MASS INDEX: 25.34 KG/M2 | RESPIRATION RATE: 15 BRPM

## 2020-06-06 DIAGNOSIS — M19.041 ARTHRITIS OF RIGHT HAND: Primary | ICD-10-CM

## 2020-06-06 DIAGNOSIS — M79.641 HAND PAIN, RIGHT: ICD-10-CM

## 2020-06-06 PROCEDURE — 1036F TOBACCO NON-USER: CPT | Performed by: PHYSICIAN ASSISTANT

## 2020-06-06 PROCEDURE — 99214 OFFICE O/P EST MOD 30 MIN: CPT | Performed by: PHYSICIAN ASSISTANT

## 2020-06-06 PROCEDURE — 3074F SYST BP LT 130 MM HG: CPT | Performed by: PHYSICIAN ASSISTANT

## 2020-06-06 PROCEDURE — 3078F DIAST BP <80 MM HG: CPT | Performed by: PHYSICIAN ASSISTANT

## 2020-06-06 PROCEDURE — 73130 X-RAY EXAM OF HAND: CPT

## 2020-06-06 PROCEDURE — 1160F RVW MEDS BY RX/DR IN RCRD: CPT | Performed by: PHYSICIAN ASSISTANT

## 2020-06-18 ENCOUNTER — OFFICE VISIT (OUTPATIENT)
Dept: OTOLARYNGOLOGY | Facility: CLINIC | Age: 70
End: 2020-06-18
Payer: COMMERCIAL

## 2020-06-18 VITALS — BODY MASS INDEX: 25.34 KG/M2 | HEIGHT: 70 IN | WEIGHT: 177 LBS | TEMPERATURE: 98.6 F

## 2020-06-18 DIAGNOSIS — H69.81 ETD (EUSTACHIAN TUBE DYSFUNCTION), RIGHT: Primary | ICD-10-CM

## 2020-06-18 PROCEDURE — 1160F RVW MEDS BY RX/DR IN RCRD: CPT | Performed by: OTOLARYNGOLOGY

## 2020-06-18 PROCEDURE — 99213 OFFICE O/P EST LOW 20 MIN: CPT | Performed by: OTOLARYNGOLOGY

## 2020-06-18 PROCEDURE — 3008F BODY MASS INDEX DOCD: CPT | Performed by: OTOLARYNGOLOGY

## 2020-06-18 PROCEDURE — 1036F TOBACCO NON-USER: CPT | Performed by: OTOLARYNGOLOGY

## 2020-06-18 PROCEDURE — 3074F SYST BP LT 130 MM HG: CPT | Performed by: OTOLARYNGOLOGY

## 2020-06-18 PROCEDURE — 3078F DIAST BP <80 MM HG: CPT | Performed by: OTOLARYNGOLOGY

## 2020-07-23 ENCOUNTER — OFFICE VISIT (OUTPATIENT)
Dept: OTOLARYNGOLOGY | Facility: CLINIC | Age: 70
End: 2020-07-23
Payer: COMMERCIAL

## 2020-07-23 VITALS — WEIGHT: 177 LBS | TEMPERATURE: 98 F | BODY MASS INDEX: 25.34 KG/M2 | HEIGHT: 70 IN

## 2020-07-23 DIAGNOSIS — H69.81 DYSFUNCTION OF RIGHT EUSTACHIAN TUBE: Primary | ICD-10-CM

## 2020-07-23 PROCEDURE — 3078F DIAST BP <80 MM HG: CPT | Performed by: OTOLARYNGOLOGY

## 2020-07-23 PROCEDURE — 3074F SYST BP LT 130 MM HG: CPT | Performed by: OTOLARYNGOLOGY

## 2020-07-23 PROCEDURE — 1036F TOBACCO NON-USER: CPT | Performed by: OTOLARYNGOLOGY

## 2020-07-23 PROCEDURE — 3008F BODY MASS INDEX DOCD: CPT | Performed by: OTOLARYNGOLOGY

## 2020-07-23 PROCEDURE — 1160F RVW MEDS BY RX/DR IN RCRD: CPT | Performed by: OTOLARYNGOLOGY

## 2020-07-23 PROCEDURE — 99212 OFFICE O/P EST SF 10 MIN: CPT | Performed by: OTOLARYNGOLOGY

## 2020-07-24 NOTE — PROGRESS NOTES
Otolaryngology-- Head and Neck Surgery Follow up visit      CC: right ETD      Time interval of problem since last visit:  1 month    Pertinent interval elements of the history:  Doing well  No ear issues  No fullness, popping, clicking, pressure  No complaints today      Review of any relevant imaging:      Interval Review of systems:  General: no weight change, normal energy  CV: no palpitations or chest pain  Pulm: no shortness of breath    Interval Social History:  Social History     Socioeconomic History    Marital status: /Civil Union     Spouse name: Not on file    Number of children: Not on file    Years of education: Not on file    Highest education level: Not on file   Occupational History    Not on file   Social Needs    Financial resource strain: Not on file    Food insecurity:     Worry: Not on file     Inability: Not on file    Transportation needs:     Medical: Not on file     Non-medical: Not on file   Tobacco Use    Smoking status: Former Smoker     Packs/day: 2 00     Years: 20 00     Pack years: 40 00     Last attempt to quit: 6/20/2005     Years since quitting: 15 1    Smokeless tobacco: Never Used   Substance and Sexual Activity    Alcohol use: Yes     Comment: occasional     Drug use: No    Sexual activity: Not on file   Lifestyle    Physical activity:     Days per week: Not on file     Minutes per session: Not on file    Stress: Not on file   Relationships    Social connections:     Talks on phone: Not on file     Gets together: Not on file     Attends [employer] service: Not on file     Active member of club or organization: Not on file     Attends meetings of clubs or organizations: Not on file     Relationship status: Not on file    Intimate partner violence:     Fear of current or ex partner: Not on file     Emotionally abused: Not on file     Physically abused: Not on file     Forced sexual activity: Not on file   Other Topics Concern    Not on file   Social History Narrative    Not on file       Interval Physical Examination:  Temp 98 °F (36 7 °C) (Temporal)   Ht 5' 10" (1 778 m)   Wt 80 3 kg (177 lb)   BMI 25 40 kg/m²   NAD  AD: TMI, well healed, no middle ear effusion  Procedure:      Assessment:  1  Dysfunction of right eustachian tube         Plan:  1  [place] well healed  Stable symptoms  Follow up as needed

## 2020-08-21 DIAGNOSIS — I10 BENIGN ESSENTIAL HYPERTENSION: Primary | ICD-10-CM

## 2020-08-22 RX ORDER — LOSARTAN POTASSIUM 50 MG/1
TABLET ORAL
Qty: 90 TABLET | Refills: 3 | Status: SHIPPED | OUTPATIENT
Start: 2020-08-22 | End: 2021-07-08

## 2020-09-13 DIAGNOSIS — E78.00 HYPERCHOLESTEROLEMIA: ICD-10-CM

## 2020-09-14 RX ORDER — ROSUVASTATIN CALCIUM 10 MG/1
TABLET, COATED ORAL
Qty: 90 TABLET | Refills: 3 | Status: SHIPPED | OUTPATIENT
Start: 2020-09-14 | End: 2021-08-04

## 2020-11-25 DIAGNOSIS — J45.30 MILD PERSISTENT ASTHMA WITHOUT COMPLICATION: ICD-10-CM

## 2020-11-28 RX ORDER — FLUTICASONE FUROATE AND VILANTEROL TRIFENATATE 100; 25 UG/1; UG/1
POWDER RESPIRATORY (INHALATION)
Qty: 180 EACH | Refills: 3 | Status: SHIPPED | OUTPATIENT
Start: 2020-11-28 | End: 2022-01-07

## 2020-11-30 ENCOUNTER — VBI (OUTPATIENT)
Dept: ADMINISTRATIVE | Facility: OTHER | Age: 70
End: 2020-11-30

## 2021-03-01 DIAGNOSIS — Z23 ENCOUNTER FOR IMMUNIZATION: ICD-10-CM

## 2021-03-29 ENCOUNTER — APPOINTMENT (OUTPATIENT)
Dept: RADIOLOGY | Facility: CLINIC | Age: 71
End: 2021-03-29
Payer: COMMERCIAL

## 2021-03-29 ENCOUNTER — OFFICE VISIT (OUTPATIENT)
Dept: FAMILY MEDICINE CLINIC | Facility: CLINIC | Age: 71
End: 2021-03-29
Payer: COMMERCIAL

## 2021-03-29 VITALS
HEIGHT: 70 IN | HEART RATE: 76 BPM | RESPIRATION RATE: 16 BRPM | TEMPERATURE: 96.5 F | SYSTOLIC BLOOD PRESSURE: 140 MMHG | BODY MASS INDEX: 27.06 KG/M2 | DIASTOLIC BLOOD PRESSURE: 86 MMHG | WEIGHT: 189 LBS

## 2021-03-29 DIAGNOSIS — M25.474 SWELLING OF FOOT JOINT, RIGHT: Primary | ICD-10-CM

## 2021-03-29 DIAGNOSIS — Z12.31 ENCOUNTER FOR SCREENING MAMMOGRAM FOR MALIGNANT NEOPLASM OF BREAST: ICD-10-CM

## 2021-03-29 DIAGNOSIS — M79.672 LEFT FOOT PAIN: ICD-10-CM

## 2021-03-29 DIAGNOSIS — M25.474 SWELLING OF FOOT JOINT, RIGHT: ICD-10-CM

## 2021-03-29 DIAGNOSIS — Z00.00 MEDICARE ANNUAL WELLNESS VISIT, SUBSEQUENT: ICD-10-CM

## 2021-03-29 PROCEDURE — 3725F SCREEN DEPRESSION PERFORMED: CPT | Performed by: FAMILY MEDICINE

## 2021-03-29 PROCEDURE — G0439 PPPS, SUBSEQ VISIT: HCPCS | Performed by: FAMILY MEDICINE

## 2021-03-29 PROCEDURE — 1160F RVW MEDS BY RX/DR IN RCRD: CPT | Performed by: FAMILY MEDICINE

## 2021-03-29 PROCEDURE — 3288F FALL RISK ASSESSMENT DOCD: CPT | Performed by: FAMILY MEDICINE

## 2021-03-29 PROCEDURE — 3008F BODY MASS INDEX DOCD: CPT | Performed by: FAMILY MEDICINE

## 2021-03-29 PROCEDURE — 1101F PT FALLS ASSESS-DOCD LE1/YR: CPT | Performed by: FAMILY MEDICINE

## 2021-03-29 PROCEDURE — 1036F TOBACCO NON-USER: CPT | Performed by: FAMILY MEDICINE

## 2021-03-29 PROCEDURE — 1170F FXNL STATUS ASSESSED: CPT | Performed by: FAMILY MEDICINE

## 2021-03-29 PROCEDURE — 1125F AMNT PAIN NOTED PAIN PRSNT: CPT | Performed by: FAMILY MEDICINE

## 2021-03-29 PROCEDURE — 73630 X-RAY EXAM OF FOOT: CPT

## 2021-03-29 PROCEDURE — 99214 OFFICE O/P EST MOD 30 MIN: CPT | Performed by: FAMILY MEDICINE

## 2021-03-29 NOTE — PATIENT INSTRUCTIONS
Medicare Preventive Visit Patient Instructions  Thank you for completing your Welcome to Medicare Visit or Medicare Annual Wellness Visit today  Your next wellness visit will be due in one year (3/30/2022)  The screening/preventive services that you may require over the next 5-10 years are detailed below  Some tests may not apply to you based off risk factors and/or age  Screening tests ordered at today's visit but not completed yet may show as past due  Also, please note that scanned in results may not display below  Preventive Screenings:  Service Recommendations Previous Testing/Comments   Colorectal Cancer Screening  * Colonoscopy    * Fecal Occult Blood Test (FOBT)/Fecal Immunochemical Test (FIT)  * Fecal DNA/Cologuard Test  * Flexible Sigmoidoscopy Age: 54-65 years old   Colonoscopy: every 10 years (may be performed more frequently if at higher risk)  OR  FOBT/FIT: every 1 year  OR  Cologuard: every 3 years  OR  Sigmoidoscopy: every 5 years  Screening may be recommended earlier than age 48 if at higher risk for colorectal cancer  Also, an individualized decision between you and your healthcare provider will decide whether screening between the ages of 74-80 would be appropriate  Colonoscopy: 06/27/2014  FOBT/FIT: Not on file  Cologuard: Not on file  Sigmoidoscopy: Not on file          Breast Cancer Screening Age: 36 years old  Frequency: every 1-2 years  Not required if history of left and right mastectomy Mammogram: 07/11/2017        Cervical Cancer Screening Between the ages of 21-29, pap smear recommended once every 3 years  Between the ages of 33-67, can perform pap smear with HPV co-testing every 5 years     Recommendations may differ for women with a history of total hysterectomy, cervical cancer, or abnormal pap smears in past  Pap Smear: Not on file        Hepatitis C Screening Once for adults born between [facility]  More frequently in patients at high risk for Hepatitis C Hep C Antibody: Not on file        Diabetes Screening 1-2 times per year if you're at risk for diabetes or have pre-diabetes Fasting glucose: 91 mg/dL   A1C: No results in last 5 years        Cholesterol Screening Once every 5 years if you don't have a lipid disorder  May order more often based on risk factors  Lipid panel: 10/25/2019          Other Preventive Screenings Covered by Medicare:  1  Abdominal Aortic Aneurysm (AAA) Screening: covered once if your at risk  You're considered to be at risk if you have a family history of AAA  2  Lung Cancer Screening: covers low dose CT scan once per year if you meet all of the following conditions: (1) Age 50-69; (2) No signs or symptoms of lung cancer; (3) Current smoker or have quit smoking within the last 15 years; (4) You have a tobacco smoking history of at least 30 pack years (packs per day multiplied by number of years you smoked); (5) You get a written order from a healthcare provider  3  Glaucoma Screening: covered annually if you're considered high risk: (1) You have diabetes OR (2) Family history of glaucoma OR (3)  aged 48 and older OR (3)  American aged 72 and older  3  Osteoporosis Screening: covered every 2 years if you meet one of the following conditions: (1) You're estrogen deficient and at risk for osteoporosis based off medical history and other findings; (2) Have a vertebral abnormality; (3) On glucocorticoid therapy for more than 3 months; (4) Have primary hyperparathyroidism; (5) On osteoporosis medications and need to assess response to drug therapy  · Last bone density test (DXA Scan): 06/28/2016   5  HIV Screening: covered annually if you're between the age of 15-65  Also covered annually if you are younger than 13 and older than 72 with risk factors for HIV infection  For pregnant patients, it is covered up to 3 times per pregnancy      Immunizations:  Immunization Recommendations   Influenza Vaccine Annual influenza vaccination during flu season is recommended for all persons aged >= 6 months who do not have contraindications   Pneumococcal Vaccine (Prevnar and Pneumovax)  * Prevnar = PCV13  * Pneumovax = PPSV23   Adults 25-60 years old: 1-3 doses may be recommended based on certain risk factors  Adults 72 years old: Prevnar (PCV13) vaccine recommended followed by Pneumovax (PPSV23) vaccine  If already received PPSV23 since turning 65, then PCV13 recommended at least one year after PPSV23 dose  Hepatitis B Vaccine 3 dose series if at intermediate or high risk (ex: diabetes, end stage renal disease, liver disease)   Tetanus (Td) Vaccine - COST NOT COVERED BY MEDICARE PART B Following completion of primary series, a booster dose should be given every 10 years to maintain immunity against tetanus  Td may also be given as tetanus wound prophylaxis  Tdap Vaccine - COST NOT COVERED BY MEDICARE PART B Recommended at least once for all adults  For pregnant patients, recommended with each pregnancy  Shingles Vaccine (Shingrix) - COST NOT COVERED BY MEDICARE PART B  2 shot series recommended in those aged 48 and above     Health Maintenance Due:      Topic Date Due    Hepatitis C Screening  Never done    MAMMOGRAM  07/11/2018    Colorectal Cancer Screening  06/27/2024     Immunizations Due:      Topic Date Due    DTaP,Tdap,and Td Vaccines (1 - Tdap) Never done    Pneumococcal Vaccine: 65+ Years (1 of 1 - PPSV23) Never done     Advance Directives   What are advance directives? Advance directives are legal documents that state your wishes and plans for medical care  These plans are made ahead of time in case you lose your ability to make decisions for yourself  Advance directives can apply to any medical decision, such as the treatments you want, and if you want to donate organs  What are the types of advance directives? There are many types of advance directives, and each state has rules about how to use them   You may choose a combination of any of the following:  · Living will: This is a written record of the treatment you want  You can also choose which treatments you do not want, which to limit, and which to stop at a certain time  This includes surgery, medicine, IV fluid, and tube feedings  · Durable power of  for healthcare [facility]): This is a written record that states who you want to make healthcare choices for you when you are unable to make them for yourself  This person, called a proxy, is usually a family member or a friend  You may choose more than 1 proxy  · Do not resuscitate (DNR) order:  A DNR order is used in case your heart stops beating or you stop breathing  It is a request not to have certain forms of treatment, such as CPR  A DNR order may be included in other types of advance directives  · Medical directive: This covers the care that you want if you are in a coma, near death, or unable to make decisions for yourself  You can list the treatments you want for each condition  Treatment may include pain medicine, surgery, blood transfusions, dialysis, IV or tube feedings, and a ventilator (breathing machine)  · Values history: This document has questions about your views, beliefs, and how you feel and think about life  This information can help others choose the care that you would choose  Why are advance directives important? An advance directive helps you control your care  Although spoken wishes may be used, it is better to have your wishes written down  Spoken wishes can be misunderstood, or not followed  Treatments may be given even if you do not want them  An advance directive may make it easier for your family to make difficult choices about your care  Weight Management   Why it is important to manage your weight:  Being overweight increases your risk of health conditions such as heart disease, high blood pressure, type 2 diabetes, and certain types of cancer   It can also increase your risk for osteoarthritis, sleep apnea, and other respiratory problems  Aim for a slow, steady weight loss  Even a small amount of weight loss can lower your risk of health problems  How to lose weight safely:  A safe and healthy way to lose weight is to eat fewer calories and get regular exercise  You can lose up about 1 pound a week by decreasing the number of calories you eat by 500 calories each day  Healthy meal plan for weight management:  A healthy meal plan includes a variety of foods, contains fewer calories, and helps you stay healthy  A healthy meal plan includes the following:  · Eat whole-grain foods more often  A healthy meal plan should contain fiber  Fiber is the part of grains, fruits, and vegetables that is not broken down by your body  Whole-grain foods are healthy and provide extra fiber in your diet  Some examples of whole-grain foods are whole-wheat breads and pastas, oatmeal, brown rice, and bulgur  · Eat a variety of vegetables every day  Include dark, leafy greens such as spinach, kale, ata greens, and mustard greens  Eat yellow and orange vegetables such as carrots, sweet potatoes, and winter squash  · Eat a variety of fruits every day  Choose fresh or canned fruit (canned in its own juice or light syrup) instead of juice  Fruit juice has very little or no fiber  · Eat low-fat dairy foods  Drink fat-free (skim) milk or 1% milk  Eat fat-free yogurt and low-fat cottage cheese  Try low-fat cheeses such as mozzarella and other reduced-fat cheeses  · Choose meat and other protein foods that are low in fat  Choose beans or other legumes such as split peas or lentils  Choose fish, skinless poultry (chicken or turkey), or lean cuts of red meat (beef or pork)  Before you cook meat or poultry, cut off any visible fat  · Use less fat and oil  Try baking foods instead of frying them  Add less fat, such as margarine, sour cream, regular salad dressing and mayonnaise to foods  Eat fewer high-fat foods   Some examples of high-fat foods include french fries, doughnuts, ice cream, and cakes  · Eat fewer sweets  Limit foods and drinks that are high in sugar  This includes candy, cookies, regular soda, and sweetened drinks  Exercise:  Exercise at least 30 minutes per day on most days of the week  Some examples of exercise include walking, biking, dancing, and swimming  You can also fit in more physical activity by taking the stairs instead of the elevator or parking farther away from stores  Ask your healthcare provider about the best exercise plan for you  © Copyright [employer] 2018 Information is for End User's use only and may not be sold, redistributed or otherwise used for commercial purposes   All illustrations and images included in CareNotes® are the copyrighted property of A D A M , [employer]  or [address] [place], [place] [address]talib

## 2021-03-29 NOTE — PROGRESS NOTES
Assessment and Plan:     Problem List Items Addressed This Visit     None      Visit Diagnoses     Swelling of foot joint, right    -  Primary    Relevant Orders    XR foot 3+ vw left    Ambulatory referral to Orthopedic Surgery    MRI foot/forefoot toes left wo contrast    Encounter for screening mammogram for malignant neoplasm of breast        Relevant Orders    Mammo screening bilateral w cad    Left foot pain        Relevant Orders    XR foot 3+ vw left    Ambulatory referral to Orthopedic Surgery    MRI foot/forefoot toes left wo contrast           Preventive health issues were discussed with patient, and age appropriate screening tests were ordered as noted in patient's After Visit Summary  Personalized health advice and appropriate referrals for health education or preventive services given if needed, as noted in patient's After Visit Summary       History of Present Illness:     Patient presents for Medicare Annual Wellness visit    Patient Care Team:  Nhung Stokes MD as PCP - General Nathanael Asencio MD     Problem List:     Patient Active Problem List   Diagnosis    Asthma    Benign essential hypertension    Hypercholesterolemia    Hyperparathyroidism ([address] )    Seasonal allergic rhinitis due to pollen    Localized osteoarthritis of right knee    Cough      Past Medical and Surgical History:     Past Medical History:   Diagnosis Date    Abnormal inflammatory bowel disease panel     last assessed: 09/03/14    Asthma     Chest wall contusion     last assessed: 04/21/17    Dermatomycosis 08/02/2011    Hordeolum internum of left eye     last assessed: 06/20/13    Hx of oral aphthous ulcers 08/22/2011    Hypercalcemia 01/11/2011    Hyperlipidemia     Hypertension     Sciatica 05/02/2008    Ulceration of intestine     last assessed: 9/03/14     Past Surgical History:   Procedure Laterality Date    MYRINGOTOMY Bilateral       Family History:     Family History   Problem Relation Age of Onset    No Known Problems Mother         macular degeneration per allscripts    No Known Problems Father       Social History:     E-Cigarette/Vaping    E-Cigarette Use Never User      E-Cigarette/Vaping Substances    Nicotine No     THC No     CBD No     Flavoring No     Other No     Unknown No      Social History     Socioeconomic History    Marital status: /Civil Union     Spouse name: None    Number of children: None    Years of education: None    Highest education level: None   Occupational History    None   Social Needs    Financial resource strain: None    Food insecurity     Worry: None     Inability: None    Transportation needs     Medical: None     Non-medical: None   Tobacco Use    Smoking status: Former Smoker     Packs/day: 2 00     Years: 20 00     Pack years: 40 00     Quit date: 6/20/2005     Years since quitting: 15 7    Smokeless tobacco: Never Used   Substance and Sexual Activity    Alcohol use: Yes     Comment: occasional     Drug use: No    Sexual activity: None   Lifestyle    Physical activity     Days per week: None     Minutes per session: None    Stress: None   Relationships    Social connections     Talks on phone: None     Gets together: None     Attends Mu-ism service: None     Active member of club or organization: None     Attends meetings of clubs or organizations: None     Relationship status: None    Intimate partner violence     Fear of current or ex partner: None     Emotionally abused: None     Physically abused: None     Forced sexual activity: None   Other Topics Concern    None   Social History Narrative    None      Medications and Allergies:     Current Outpatient Medications   Medication Sig Dispense Refill    albuterol (PROVENTIL HFA,VENTOLIN HFA) 90 mcg/act inhaler Inhale 2 puffs every 4 (four) hours as needed for wheezing      ascorbic acid (VITAMIN C) 500 mg tablet Take 500 mg by mouth 2 (two) times a day     [employer] 100-25 MCG/INH inhaler USE 1 INHALATION BY MOUTH  DAILY  RINSE MOUTH AFTER  USE  180 each 3    losartan (COZAAR) 50 mg tablet TAKE 1 TABLET BY MOUTH  DAILY 90 tablet 3    multivitamin (THERAGRAN) TABS Take 1 tablet by mouth daily      Olive Leaf Extract 500 MG CAPS Take by mouth as needed        rosuvastatin (CRESTOR) 10 MG tablet TAKE 1 TABLET BY MOUTH  DAILY AT BEDTIME 90 tablet 3     No current facility-administered medications for this visit  Allergies   Allergen Reactions    Molds & Smuts Shortness Of Breath     Mold and mildew      Immunizations:     Immunization History   Administered Date(s) Administered    Influenza Quadrivalent Preservative Free 3 years and older IM 02/16/2017    Influenza, high dose seasonal 0 7 mL 10/20/2020    SARS-CoV-2 / COVID-19 mRNA IM (Giancarlo Roxy) 02/06/2021, 03/08/2021      Health Maintenance:         Topic Date Due    Hepatitis C Screening  Never done    MAMMOGRAM  07/11/2018    Colorectal Cancer Screening  06/27/2024         Topic Date Due    DTaP,Tdap,and Td Vaccines (1 - Tdap) Never done    Pneumococcal Vaccine: 65+ Years (1 of 1 - PPSV23) Never done      Medicare Health Risk Assessment:     /86 (BP Location: Left arm, Patient Position: Sitting, Cuff Size: Standard)   Pulse 76   Temp (!) 96 5 °F (35 8 °C) (Tympanic)   Resp 16   Ht 5' 10" (1 778 m)   Wt 85 7 kg (189 lb)   BMI 27 12 kg/m²      Debe is here for her Subsequent Wellness visit  Health Risk Assessment:   Patient rates overall health as very good  Patient feels that their physical health rating is slightly worse  Patient is very satisfied with their life  Eyesight was rated as slightly worse  Hearing was rated as same  Patient feels that their emotional and mental health rating is slightly better  Patients states they are never, rarely angry  Patient states they are never, rarely unusually tired/fatigued  Pain experienced in the last 7 days has been some   Patient's pain rating has been 8/10  Patient states that she has experienced no weight loss or gain in last 6 months  L foot pain x 1 wk     Depression Screening:   PHQ-2 Score: 0      Fall Risk Screening: In the past year, patient has experienced: no history of falling in past year      Urinary Incontinence Screening:   Patient has not leaked urine accidently in the last six months  Home Safety:  Patient does not have trouble with stairs inside or outside of their home  Patient has working smoke alarms and has working carbon monoxide detector  Home safety hazards include: none  Nutrition:   Current diet is Regular  Medications:   Patient is currently taking over-the-counter supplements  OTC medications include: see medication list  Patient is able to manage medications  Activities of Daily Living (ADLs)/Instrumental Activities of Daily Living (IADLs):   Walk and transfer into and out of bed and chair?: Yes  Dress and groom yourself?: Yes    Bathe or shower yourself?: Yes    Feed yourself? Yes  Do your laundry/housekeeping?: Yes  Manage your money, pay your bills and track your expenses?: Yes  Make your own meals?: Yes    Do your own shopping?: Yes    Previous Hospitalizations:   Any hospitalizations or ED visits within the last 12 months?: No      Advance Care Planning:   Living will: Yes    Durable POA for healthcare:  Yes    Advanced directive: Yes      Cognitive Screening:   Provider or family/friend/caregiver concerned regarding cognition?: No    PREVENTIVE SCREENINGS      Cardiovascular Screening:    General: Screening Not Indicated, History Lipid Disorder and Patient Declines      Diabetes Screening:     General: Patient Declines      Colorectal Cancer Screening:     General: Screening Current      Breast Cancer Screening:       Due for: Mammogram        Cervical Cancer Screening:    General: Screening Not Indicated      Osteoporosis Screening:    General: Patient Declines      Abdominal Aortic Aneurysm (AAA) Screening: General: Screening Not Indicated      Lung Cancer Screening:     General: Screening Not Indicated      Hepatitis C Screening:    General: Screening Not Indicated    Screening, Brief Intervention, and Referral to Treatment (SBIRT)    Screening  Typical number of drinks in a day: 3  Typical number of drinks in a week: 20  Interpretation: Low risk drinking behavior  Single Item Drug Screening:  How often have you used an illegal drug (including marijuana) or a prescription medication for non-medical reasons in the past year? never    Single Item Drug Screen Score: 0  Interpretation: Negative screen for possible drug use disorder    Brief Intervention  Alcohol & drug use screenings were reviewed  No concerns regarding substance use disorder identified  Healthy alcohol use/limits discussed         Billy Herrera MD

## 2021-03-29 NOTE — PROGRESS NOTES
Chief Complaint   Patient presents with    Foot Pain     left foot    Medicare Wellness Visit        Patient ID: Missy Travis is a 78 y o  female  HPI  Pt is seeing for a acute L foot swelling with pain when walking -  Started few days ago - no injury recalled -  No therapy tried, started to walk daily for 30 min few wks prior     The following portions of the patient's history were reviewed and updated as appropriate: allergies, current medications, past family history, past medical history, past social history, past surgical history and problem list     Review of Systems   Constitutional: Negative  Respiratory: Negative  Cardiovascular: Negative  Gastrointestinal: Negative  Genitourinary: Negative  Skin: Negative  Neurological: Negative  Current Outpatient Medications   Medication Sig Dispense Refill    albuterol (PROVENTIL HFA,VENTOLIN HFA) 90 mcg/act inhaler Inhale 2 puffs every 4 (four) hours as needed for wheezing      ascorbic acid (VITAMIN C) 500 mg tablet Take 500 mg by mouth 2 (two) times a day      Breo Ellipta 100-25 MCG/INH inhaler USE 1 INHALATION BY MOUTH  DAILY  RINSE MOUTH AFTER  USE  180 each 3    losartan (COZAAR) 50 mg tablet TAKE 1 TABLET BY MOUTH  DAILY 90 tablet 3    multivitamin (THERAGRAN) TABS Take 1 tablet by mouth daily      Olive Leaf Extract 500 MG CAPS Take by mouth as needed        rosuvastatin (CRESTOR) 10 MG tablet TAKE 1 TABLET BY MOUTH  DAILY AT BEDTIME 90 tablet 3     No current facility-administered medications for this visit  Objective:    /86 (BP Location: Left arm, Patient Position: Sitting, Cuff Size: Standard)   Pulse 76   Temp (!) 96 5 °F (35 8 °C) (Tympanic)   Resp 16   Ht 5' 10" (1 778 m)   Wt 85 7 kg (189 lb)   BMI 27 12 kg/m²        Physical Exam  Constitutional:       Appearance: She is not ill-appearing  Cardiovascular:      Rate and Rhythm: Normal rate     Musculoskeletal:      Left ankle: She exhibits swelling  She exhibits normal range of motion and no ecchymosis  No tenderness  Right lower leg: No edema  Left foot: Normal range of motion  Tenderness, bony tenderness and swelling present  Feet:    Neurological:      Mental Status: She is alert  Labs in chart were reviewed  Assessment/Plan:         Diagnoses and all orders for this visit:    Swelling of foot joint, right  -     XR foot 3+ vw left; Future  -     Ambulatory referral to Orthopedic Surgery; Future  -     MRI foot/forefoot toes left wo contrast; Future  Rest  Ice  Elevation of L foot   Encounter for screening mammogram for malignant neoplasm of breast  -     Mammo screening bilateral w cad; Future    Left foot pain  -     XR foot 3+ vw left; Future  -     Ambulatory referral to Orthopedic Surgery; Future  -     MRI foot/forefoot toes left wo contrast; Future            BMI Counseling: Body mass index is 27 12 kg/m²  Discussed the patient's BMI with her  The BMI is above normal  Nutrition recommendations include reducing portion sizes, decreasing overall calorie intake, 3-5 servings of fruits/vegetables daily and reducing fast food intake  Exercise recommendations include exercising 3-5 times per week         rto kojo Davila MD

## 2021-04-08 NOTE — PROGRESS NOTES
Subjective     Ava Harrison is a 74 y o  female who presents for evaluation of follow up on sinusitis  Seen on 1/25/18 by another provider at this clinic  Was treated with zpack, pred, albuterol inhaler  She finished Zpack  Still taking inhalers (including flovent) and pred  She feels very tired and short of breath with activity  Reports hx of bronchitis complicated by asthma exacerbation  Denies hx of CHF, pneumonia  Symptoms include congestion, cough described as nonproductive, shortness of breath, wheezing and worsening fatigue  Onset of symptoms was 1 week ago and has been gradually worsening since that time  Treatment to date: antibiotics and nasal steroids  The following portions of the patient's history were reviewed and updated as appropriate: allergies, current medications, past family history, past medical history, past social history, past surgical history and problem list     Review of Systems  Constitutional: positive for anorexia, fatigue and fevers  Ears, nose, mouth, throat, and face: positive for nasal congestion  Respiratory: positive for asthma, cough and shortness of breath with exertion  Unable to lie down in bed  Cardiovascular: negative  Gastrointestinal: negative  Integument/breast: negative  Neurological: positive for dizziness  Objective     /80 (BP Location: Left arm, Patient Position: Sitting, Cuff Size: Adult)   Pulse 76   Temp 98 1 °F (36 7 °C) (Temporal)   Resp (!) 24   Ht 5' 10" (1 778 m)   Wt 83 9 kg (185 lb)   SpO2 98% Comment: room air  BMI 26 54 kg/m²   General appearance: alert and oriented, in no acute distress  Ears: abnormal TM right ear - dull and abnormal TM left ear - dull  Nose: clear discharge, turbinates red, swollen  Throat: abnormal findings: moderate oropharyngeal erythema  Lungs: rhonchi in bases   No exp wheezes  Heart: regular rate and rhythm, S1, S2 normal, no murmur, click, rub or gallop  Abdomen: soft, non-tender; bowel sounds normal; no masses,  no organomegaly  Pulses: 2+ and symmetric  Skin: Skin color, texture, turgor normal  No rashes or lesions  Lymph nodes: Cervical adenopathy: present  Neurologic: Grossly normal     Assessment/Plan     Bronchitis with asthma exacerbation  Must r/o community acquired pneumonia    Discussed diagnosis and treatment of URI  Suggested symptomatic OTC remedies  Nasal saline spray for congestion  Ceftin per orders  Nasal steroids per orders  Follow up as needed  If symptoms cont to worsen, go to ER for urgent evaluation  Refuses ER eval at this time  Patient declines recommendation of mucolytic "makes me feel off"  CXR today (stat) I will call with results once received    I have reviewed the instructions with the patient, answering all questions to her satisfaction  normal

## 2021-04-16 ENCOUNTER — TELEPHONE (OUTPATIENT)
Dept: FAMILY MEDICINE CLINIC | Facility: CLINIC | Age: 71
End: 2021-04-16

## 2021-04-19 NOTE — TELEPHONE ENCOUNTER
Left message on pt home answering machine -insurance declined mri will have to wait for ortho appt   We tried calling cell mailbox full

## 2021-04-22 ENCOUNTER — HOSPITAL ENCOUNTER (OUTPATIENT)
Dept: RADIOLOGY | Facility: HOSPITAL | Age: 71
Discharge: HOME | End: 2021-04-22
Attending: FAMILY MEDICINE
Payer: COMMERCIAL

## 2021-04-22 VITALS — BODY MASS INDEX: 25.77 KG/M2 | WEIGHT: 180 LBS | HEIGHT: 70 IN

## 2021-04-22 DIAGNOSIS — Z12.31 ENCOUNTER FOR SCREENING MAMMOGRAM FOR MALIGNANT NEOPLASM OF BREAST: ICD-10-CM

## 2021-04-22 PROCEDURE — 77063 BREAST TOMOSYNTHESIS BI: CPT

## 2021-04-22 PROCEDURE — 77067 SCR MAMMO BI INCL CAD: CPT

## 2021-04-23 ENCOUNTER — TELEPHONE (OUTPATIENT)
Dept: FAMILY MEDICINE CLINIC | Facility: CLINIC | Age: 71
End: 2021-04-23

## 2021-04-26 ENCOUNTER — TELEPHONE (OUTPATIENT)
Dept: FAMILY MEDICINE CLINIC | Facility: CLINIC | Age: 71
End: 2021-04-26

## 2021-04-26 NOTE — TELEPHONE ENCOUNTER
----- Message from Jimmy Garcia MD sent at 4/23/2021  9:07 AM EDT -----  Regarding: FW:  Please advise patient- normal mammogram  ----- Message -----  From: Venu Pollack MD  Sent: 4/22/2021  12:37 PM EDT  To: Jimmy Garcia MD

## 2021-04-26 NOTE — TELEPHONE ENCOUNTER
----- Message from Xavier Henderson MD sent at 4/23/2021  9:07 AM EDT -----  Regarding: FW:  Please advise patient- normal mammogram  ----- Message -----  From: Ilir Ascencio MD  Sent: 4/22/2021  12:37 PM EDT  To: Xavier Henderson MD

## 2021-07-07 DIAGNOSIS — I10 BENIGN ESSENTIAL HYPERTENSION: ICD-10-CM

## 2021-07-08 RX ORDER — LOSARTAN POTASSIUM 50 MG/1
TABLET ORAL
Qty: 90 TABLET | Refills: 3 | Status: SHIPPED | OUTPATIENT
Start: 2021-07-08 | End: 2022-06-27 | Stop reason: SDUPTHER

## 2022-01-05 DIAGNOSIS — J45.30 MILD PERSISTENT ASTHMA WITHOUT COMPLICATION: ICD-10-CM

## 2022-01-07 RX ORDER — FLUTICASONE FUROATE AND VILANTEROL TRIFENATATE 100; 25 UG/1; UG/1
POWDER RESPIRATORY (INHALATION)
Qty: 180 EACH | Refills: 3 | Status: SHIPPED | OUTPATIENT
Start: 2022-01-07

## 2022-04-05 ENCOUNTER — OFFICE VISIT (OUTPATIENT)
Dept: FAMILY MEDICINE CLINIC | Facility: CLINIC | Age: 72
End: 2022-04-05
Payer: COMMERCIAL

## 2022-04-05 VITALS
HEIGHT: 70 IN | TEMPERATURE: 97.5 F | HEART RATE: 83 BPM | RESPIRATION RATE: 18 BRPM | DIASTOLIC BLOOD PRESSURE: 94 MMHG | WEIGHT: 186.2 LBS | OXYGEN SATURATION: 98 % | BODY MASS INDEX: 26.66 KG/M2 | SYSTOLIC BLOOD PRESSURE: 148 MMHG

## 2022-04-05 DIAGNOSIS — F43.21 GRIEF: Primary | ICD-10-CM

## 2022-04-05 PROCEDURE — 3008F BODY MASS INDEX DOCD: CPT | Performed by: FAMILY MEDICINE

## 2022-04-05 PROCEDURE — 99213 OFFICE O/P EST LOW 20 MIN: CPT | Performed by: NURSE PRACTITIONER

## 2022-04-05 RX ORDER — ALPRAZOLAM 0.25 MG/1
0.25 TABLET ORAL 2 TIMES DAILY PRN
Qty: 15 TABLET | Refills: 0 | Status: SHIPPED | OUTPATIENT
Start: 2022-04-05

## 2022-04-05 NOTE — PROGRESS NOTES
Assessment/Plan:    1  Grief  Comments:  death of  1 week ago  Orders:  -     ALPRAZolam (XANAX) 0 25 mg tablet; Take 1 tablet (0 25 mg total) by mouth 2 (two) times a day as needed for anxiety        The case discussed with patient using patient centered shared decision making  The patient was counseled regarding instructions for management,-- risk factor reductions,-- prognosis,-- impressions,-- risks and benefits of treatment options,-- importance of compliance with treatment  I have reviewed the instructions with the patient, answering all questions to her satisfaction  Offered my condolences and reassurance  Pt seeking assistance for maintaining composure during upcoming services, etc    She will be accompanied by her sister and other family and will not be driving while taking alprazolam  If she experienced prolonged grief, ask that she returns to office to discuss further coping strategies  There are no Patient Instructions on file for this visit  Return in about 2 weeks (around 2022)  Subjective:      Patient ID: Tosin Yuan is a 73 y o  female  Chief Complaint   Patient presents with    other     Pt would like something for anxiety due to loss of  last week        Pt presents to discuss new grief reaction    last week  She is coping as one would expect  However, she is worried that she "will fall apart during services"  She is looking for a mild sedative to help her cope  She will be accompanied by family       The following portions of the patient's history were reviewed and updated as appropriate: allergies, current medications, past family history, past medical history, past social history, past surgical history and problem list     Review of Systems   Constitutional: Negative      Psychiatric/Behavioral:        Per hpi         Current Outpatient Medications   Medication Sig Dispense Refill    albuterol (PROVENTIL HFA,VENTOLIN HFA) 90 mcg/act inhaler Inhale 2 puffs every 4 (four) hours as needed for wheezing      Breo Ellipta 100-25 MCG/INH inhaler USE 1 INHALATION BY MOUTH  DAILY RINSE MOUTH AFTER  each 3    losartan (COZAAR) 50 mg tablet TAKE 1 TABLET BY MOUTH  DAILY 90 tablet 3    multivitamin (THERAGRAN) TABS Take 1 tablet by mouth daily      Olive Leaf Extract 500 MG CAPS Take by mouth as needed        rosuvastatin (CRESTOR) 10 MG tablet TAKE 1 TABLET BY MOUTH  DAILY AT BEDTIME 90 tablet 0    ALPRAZolam (XANAX) 0 25 mg tablet Take 1 tablet (0 25 mg total) by mouth 2 (two) times a day as needed for anxiety 15 tablet 0    ascorbic acid (VITAMIN C) 500 mg tablet Take 500 mg by mouth 2 (two) times a day       No current facility-administered medications for this visit  Objective:    /94 (BP Location: Left arm, Patient Position: Sitting, Cuff Size: Large)   Pulse 83   Temp 97 5 °F (36 4 °C)   Resp 18   Ht 5' 10" (1 778 m)   Wt 84 5 kg (186 lb 3 2 oz)   SpO2 98%   BMI 26 72 kg/m²        Physical Exam  Vitals and nursing note reviewed  Constitutional:       General: She is not in acute distress  Appearance: Normal appearance  Neurological:      General: No focal deficit present  Mental Status: She is alert  Psychiatric:         Mood and Affect: Affect is tearful           Behavior: Behavior normal                 CHANO Gomez

## 2022-04-11 ENCOUNTER — TELEMEDICINE (OUTPATIENT)
Dept: FAMILY MEDICINE CLINIC | Facility: CLINIC | Age: 72
End: 2022-04-11
Payer: COMMERCIAL

## 2022-04-11 DIAGNOSIS — U07.1 COVID-19: Primary | ICD-10-CM

## 2022-04-11 PROCEDURE — 1160F RVW MEDS BY RX/DR IN RCRD: CPT | Performed by: FAMILY MEDICINE

## 2022-04-11 PROCEDURE — 99213 OFFICE O/P EST LOW 20 MIN: CPT | Performed by: FAMILY MEDICINE

## 2022-04-11 PROCEDURE — 1036F TOBACCO NON-USER: CPT | Performed by: FAMILY MEDICINE

## 2022-04-11 NOTE — PROGRESS NOTES
COVID-19 Outpatient Progress Note    Assessment/Plan:    Problem List Items Addressed This Visit     None      Visit Diagnoses     COVID-19    -  Primary    Relevant Medications    molnupiravir 200 mg capsule       pt to call in 2 days with progress     rto prn      Encounter provider Francine Dean MD    Provider located at [address] [place] [id number]    Recent Visits  Date Type Provider Dept   04/05/22 Office Visit Era Cameron 50 recent visits within past 7 days and meeting all other requirements  Today's Visits  Date Type Provider Dept   04/11/22 Telemedicine Francine Dean  [facility] today's visits and meeting all other requirements  Future Appointments  No visits were found meeting these conditions  Showing future appointments within next 150 days and meeting all other requirements     This virtual check-in was done via [employer] and patient was informed that this is a secure, HIPAA-compliant platform  She agrees to proceed  Patient agrees to participate in a virtual check in via telephone or video visit instead of presenting to the office to address urgent/immediate medical needs  Patient is aware this is a billable service  After connecting through [employer], the patient was identified by name and date of birth  Safia Moncada was informed that this was a telemedicine visit and that the exam was being conducted confidentially over secure lines  My office door was closed  No one else was in the room  Safia Moncada acknowledged consent and understanding of privacy and security of the telemedicine visit  I informed the patient that I have reviewed her record in Epic and presented the opportunity for her to ask any questions regarding the visit today  The patient agreed to participate      Verification of patient location:  Patient is located in the [facility] in which I hold an active license: [place]    Subjective:   Abdoulaye Cortez is a 73 y o  female who is concerned about COVID-19  Patient's symptoms include fatigue, malaise, nasal congestion, rhinorrhea, sore throat and cough  Patient denies fever, chills, anosmia, loss of taste, shortness of breath, chest tightness, abdominal pain, nausea, vomiting, diarrhea, myalgias and headaches       - Date of symptom onset: 4/10/2022  - Date of exposure: 4/7/2022      COVID-19 vaccination status: Fully vaccinated with booster    Exposure:   Contact with a person who is under investigation (PUI) for or who is positive for COVID-19 within the last 14 days?: Yes    Hospitalized recently for fever and/or lower respiratory symptoms?: No      Currently a healthcare worker that is involved in direct patient care?: No      Works in a special setting where the risk of COVID-19 transmission may be high? (this may include long-term care, correctional and correction facilities; homeless shelters; assisted-living facilities and group homes ): No      Resident in a special setting where the risk of COVID-19 transmission may be high? (this may include long-term care, correctional and correction facilities; homeless shelters; assisted-living facilities and group homes ): No      Positive COVID home test today     No results found for: [address] [facility] [address], [address] [facility], [address] [facility] [address] & 15[facility], [address] [facility], [facility]  Past Medical History:   Diagnosis Date    Abnormal inflammatory bowel disease panel     last assessed: 09/03/14    Asthma     Chest wall contusion     last assessed: 04/21/17    Dermatomycosis 08/02/2011    Hordeolum internum of left eye     last assessed: 06/20/13    Hx of oral aphthous ulcers 08/22/2011    Hypercalcemia 01/11/2011    Hyperlipidemia     Hypertension     Sciatica 05/02/2008    Ulceration of intestine     last assessed: 9/03/14     Past Surgical History:   Procedure Laterality Date    MYRINGOTOMY Bilateral      Current Outpatient Medications   Medication Sig Dispense Refill    albuterol (PROVENTIL HFA,VENTOLIN HFA) 90 mcg/act inhaler Inhale 2 puffs every 4 (four) hours as needed for wheezing      ALPRAZolam (XANAX) 0 25 mg tablet Take 1 tablet (0 25 mg total) by mouth 2 (two) times a day as needed for anxiety 15 tablet 0    Breo Ellipta 100-25 MCG/INH inhaler USE 1 INHALATION BY MOUTH  DAILY RINSE MOUTH AFTER  each 3    losartan (COZAAR) 50 mg tablet TAKE 1 TABLET BY MOUTH  DAILY 90 tablet 3    multivitamin (THERAGRAN) TABS Take 1 tablet by mouth daily      Olive Leaf Extract 500 MG CAPS Take by mouth as needed        rosuvastatin (CRESTOR) 10 MG tablet TAKE 1 TABLET BY MOUTH  DAILY AT BEDTIME 90 tablet 0     No current facility-administered medications for this visit  Allergies   Allergen Reactions    Molds & Smuts Shortness Of Breath     Mold and mildew       Review of Systems   Constitutional: Positive for fatigue  Negative for chills and fever  HENT: Positive for congestion, rhinorrhea and sore throat  Respiratory: Positive for cough  Negative for chest tightness and shortness of breath  Gastrointestinal: Negative for abdominal pain, diarrhea, nausea and vomiting  Musculoskeletal: Negative for myalgias  Neurological: Negative for headaches  Objective: There were no vitals filed for this visit  Physical Exam  Constitutional:       General: She is not in acute distress  Appearance: She is not ill-appearing  Pulmonary:      Effort: Pulmonary effort is normal  No respiratory distress  Neurological:      Mental Status: She is alert and oriented to person, place, and time  VIRTUAL VISIT DISCLAIMER    Safia Moncada verbally agrees to participate in [employer]   Pt is aware that [employer] could be limited without vital signs or the ability to perform a full hands-on physical exam  Safia Moncada understands she or the provider may request at any time to terminate the video visit and request the patient to seek care or treatment in person

## 2022-05-02 DIAGNOSIS — E78.00 HYPERCHOLESTEROLEMIA: ICD-10-CM

## 2022-05-02 RX ORDER — ROSUVASTATIN CALCIUM 10 MG/1
TABLET, COATED ORAL
Qty: 90 TABLET | Refills: 3 | Status: SHIPPED | OUTPATIENT
Start: 2022-05-02

## 2022-06-27 DIAGNOSIS — I10 BENIGN ESSENTIAL HYPERTENSION: ICD-10-CM

## 2022-06-27 RX ORDER — LOSARTAN POTASSIUM 50 MG/1
50 TABLET ORAL DAILY
Qty: 90 TABLET | Refills: 3 | Status: SHIPPED | OUTPATIENT
Start: 2022-06-27

## 2022-06-27 NOTE — TELEPHONE ENCOUNTER
Patient wants medical recommendation regarding the second Covid booster and/or pneumonia shot based on her medical history  Please advise

## 2022-09-15 ENCOUNTER — OFFICE VISIT (OUTPATIENT)
Dept: FAMILY MEDICINE CLINIC | Facility: CLINIC | Age: 72
End: 2022-09-15
Payer: COMMERCIAL

## 2022-09-15 VITALS
DIASTOLIC BLOOD PRESSURE: 80 MMHG | BODY MASS INDEX: 25.77 KG/M2 | TEMPERATURE: 98.3 F | HEART RATE: 78 BPM | HEIGHT: 70 IN | WEIGHT: 180 LBS | RESPIRATION RATE: 14 BRPM | SYSTOLIC BLOOD PRESSURE: 120 MMHG

## 2022-09-15 DIAGNOSIS — J45.40 MODERATE PERSISTENT ASTHMA WITHOUT COMPLICATION: ICD-10-CM

## 2022-09-15 DIAGNOSIS — E21.3 HYPERPARATHYROIDISM, UNSPECIFIED (HCC): ICD-10-CM

## 2022-09-15 DIAGNOSIS — L02.213 CUTANEOUS ABSCESS OF CHEST WALL: ICD-10-CM

## 2022-09-15 DIAGNOSIS — I10 BENIGN ESSENTIAL HYPERTENSION: ICD-10-CM

## 2022-09-15 DIAGNOSIS — L08.9 SUPERFICIAL SKIN INFECTION: Primary | ICD-10-CM

## 2022-09-15 PROCEDURE — 99214 OFFICE O/P EST MOD 30 MIN: CPT | Performed by: STUDENT IN AN ORGANIZED HEALTH CARE EDUCATION/TRAINING PROGRAM

## 2022-09-15 PROCEDURE — 1160F RVW MEDS BY RX/DR IN RCRD: CPT | Performed by: STUDENT IN AN ORGANIZED HEALTH CARE EDUCATION/TRAINING PROGRAM

## 2022-09-15 RX ORDER — CEPHALEXIN 500 MG/1
500 CAPSULE ORAL EVERY 6 HOURS SCHEDULED
Qty: 28 CAPSULE | Refills: 0 | Status: SHIPPED | OUTPATIENT
Start: 2022-09-15 | End: 2022-09-22

## 2022-09-15 NOTE — PROGRESS NOTES
Clinic Visit Note  Safia Moncada 73 y o  female   MRN: [id number]    Assessment and Plan      Diagnoses and all orders for this visit:    Superficial skin infection  Appearance of underlying infected cyst on left chest wall anterior  Patient would like to hold off on I/D if possible  I recommend Keflex x7 days, if it is not improving by day 3 recommend follow-up with Dermatology or myself for I/D, definitive management  No systemic signs of infection  -     cephalexin (KEFLEX) 500 mg capsule; Take 1 capsule (500 mg total) by mouth every 6 (six) hours for 7 days  -     Ambulatory Referral to Dermatology; Future    Cutaneous abscess of chest wall  -     Ambulatory Referral to Dermatology; Future    [facility], [facility] )  Previously seen endocrinology, recommend vitamin-D supplementation, follow-up lab work recommended next visit    Moderate persistent asthma without complication  Stable, continue Breo, albuterol p r n  Benign essential hypertension  Stable, continue losartan    My impressions and treatment recommendations were discussed in detail with the patient who verbalized understanding and had no further questions  Discharge instructions were provided  Subjective     Chief Complaint:  Same-day visit    History of Present Illness:    Patient is a pleasant 68-year-old female coming in for a same-day visit secondary to boil on the left chest wall  She has had this for some time but now it is painful and red  This all started a few days ago  No systemic signs of infection including fever, chills, shortness of breath, chest pain  The following portions of the patient's history were reviewed and updated as appropriate: allergies, current medications, past family history, past medical history, past social history, past surgical history and problem list     REVIEW OF SYSTEMS:  A complete 12-point review of systems is negative other than that noted in the HPI      Review of Systems Constitutional: Negative for chills, fatigue and fever  Respiratory: Negative for cough, shortness of breath and wheezing  Cardiovascular: Negative for chest pain, palpitations and leg swelling  Skin: Positive for rash  Negative for wound  Current Outpatient Medications:     albuterol (PROVENTIL HFA,VENTOLIN HFA) 90 mcg/act inhaler, Inhale 2 puffs every 4 (four) hours as needed for wheezing, Disp: , Rfl:     Breo Ellipta 100-25 MCG/INH inhaler, USE 1 INHALATION BY MOUTH  DAILY RINSE MOUTH AFTER USE, Disp: 180 each, Rfl: 3    cephalexin (KEFLEX) 500 mg capsule, Take 1 capsule (500 mg total) by mouth every 6 (six) hours for 7 days, Disp: 28 capsule, Rfl: 0    losartan (COZAAR) 50 mg tablet, Take 1 tablet (50 mg total) by mouth daily, Disp: 90 tablet, Rfl: 3    multivitamin (THERAGRAN) TABS, Take 1 tablet by mouth daily, Disp: , Rfl:     Olive Leaf Extract 500 MG CAPS, Take by mouth as needed  , Disp: , Rfl:     rosuvastatin (CRESTOR) 10 MG tablet, TAKE 1 TABLET BY MOUTH  DAILY AT BEDTIME, Disp: 90 tablet, Rfl: 3  Past Medical History:   Diagnosis Date    Abnormal inflammatory bowel disease panel     last assessed: 09/03/14    Asthma     Chest wall contusion     last assessed: 04/21/17    Dermatomycosis 08/02/2011    Hordeolum internum of left eye     last assessed: 06/20/13    Hx of oral aphthous ulcers 08/22/2011    Hypercalcemia 01/11/2011    Hyperlipidemia     Hypertension     Sciatica 05/02/2008    Ulceration of intestine     last assessed: 9/03/14     Past Surgical History:   Procedure Laterality Date    MYRINGOTOMY Bilateral      Social History     Socioeconomic History    Marital status:       Spouse name: Not on file    Number of children: Not on file    Years of education: Not on file    Highest education level: Not on file   Occupational History    Not on file   Tobacco Use    Smoking status: Former Smoker     Packs/day: 2 00     Years: 20 00     Pack years: 40 00     Quit date: 2005     Years since quittin 2    Smokeless tobacco: Never Used   Vaping Use    Vaping Use: Never used   Substance and Sexual Activity    Alcohol use: Yes     Comment: occasional     Drug use: No    Sexual activity: Not on file   Other Topics Concern    Not on file   Social History Narrative    Not on file     Social Determinants of Health     Financial Resource Strain: Not on file   Food Insecurity: Not on file   Transportation Needs: Not on file   Physical Activity: Not on file   Stress: Not on file   Social Connections: Not on file   Intimate Partner Violence: Not on file   Housing Stability: Not on file     Family History   Problem Relation Age of Onset    No Known Problems Mother         macular degeneration per allscripts    No Known Problems Father     No Known Problems Maternal Aunt     No Known Problems Paternal Aunt      Allergies   Allergen Reactions    Molds & Smuts Shortness Of Breath     Mold and mildew       Objective     Vitals:    09/15/22 1324   BP: 120/80   BP Location: Left arm   Patient Position: Sitting   Cuff Size: Adult   Pulse: 78   Resp: 14   Temp: 98 3 °F (36 8 °C)   TempSrc: Temporal   Weight: 81 6 kg (180 lb)   Height: 5' 10" (1 778 m)       Physical Exam:     GENERAL: NAD, pleasant   HEENT:  NC/AT, PERRL, EOMI, no scleral icterus  PULMONARY:  non-labored breathing  ABDOMEN:  Soft, NT/ND, no rebound/guarding/rigidity  Extremities:  No edema, cyanosis, or clubbing  Musculoskeletal:  Full range of motion intact in all extremities   NEUROLOGIC: Grossly intact, no focal deficits  SKIN:  Left chest superficial skin infection with abscess formation, please see media  Psych: Normal affect, mood stable    ==  PLEASE NOTE:  This encounter was completed utilizing the [employer] Direct Speech Voice Recognition Software   Grammatical errors, random word insertions, pronoun errors and incomplete sentences are occasional consequences of the system due to software limitations, ambient noise and hardware issues  These may be missed by proof reading prior to affixing electronic signature  Any questions or concerns about the content, text or information contained within the body of this dictation should be directly addressed to the physician for clarification  Please do not hesitate to call me directly if you have any any questions or concerns      Jacqui Cooper DO  [facility]   [facility]

## 2022-09-30 ENCOUNTER — OFFICE VISIT (OUTPATIENT)
Dept: FAMILY MEDICINE CLINIC | Facility: CLINIC | Age: 72
End: 2022-09-30
Payer: COMMERCIAL

## 2022-09-30 VITALS
HEIGHT: 70 IN | TEMPERATURE: 97.3 F | DIASTOLIC BLOOD PRESSURE: 80 MMHG | RESPIRATION RATE: 16 BRPM | WEIGHT: 182.2 LBS | BODY MASS INDEX: 26.08 KG/M2 | SYSTOLIC BLOOD PRESSURE: 130 MMHG | HEART RATE: 82 BPM

## 2022-09-30 DIAGNOSIS — N39.0 URINARY TRACT INFECTION WITHOUT HEMATURIA, SITE UNSPECIFIED: Primary | ICD-10-CM

## 2022-09-30 PROBLEM — M17.11 LOCALIZED OSTEOARTHRITIS OF RIGHT KNEE: Status: RESOLVED | Noted: 2019-09-12 | Resolved: 2022-09-30

## 2022-09-30 PROBLEM — R05.9 COUGH: Status: RESOLVED | Noted: 2020-01-14 | Resolved: 2022-09-30

## 2022-09-30 LAB
SL AMB  POCT GLUCOSE, UA: ABNORMAL
SL AMB LEUKOCYTE ESTERASE,UA: 500
SL AMB POCT BILIRUBIN,UA: ABNORMAL
SL AMB POCT BLOOD,UA: 250
SL AMB POCT CLARITY,UA: ABNORMAL
SL AMB POCT COLOR,UA: ABNORMAL
SL AMB POCT KETONES,UA: ABNORMAL
SL AMB POCT NITRITE,UA: ABNORMAL
SL AMB POCT PH,UA: 605
SL AMB POCT SPECIFIC GRAVITY,UA: 1015
SL AMB POCT URINE PROTEIN: 30
SL AMB POCT UROBILINOGEN: ABNORMAL

## 2022-09-30 PROCEDURE — 81003 URINALYSIS AUTO W/O SCOPE: CPT | Performed by: FAMILY MEDICINE

## 2022-09-30 PROCEDURE — 99213 OFFICE O/P EST LOW 20 MIN: CPT | Performed by: FAMILY MEDICINE

## 2022-09-30 RX ORDER — CIPROFLOXACIN 250 MG/1
250 TABLET, FILM COATED ORAL EVERY 12 HOURS SCHEDULED
Qty: 6 TABLET | Refills: 1 | Status: SHIPPED | OUTPATIENT
Start: 2022-09-30 | End: 2022-10-03

## 2022-09-30 NOTE — PROGRESS NOTES
Chief Complaint   Patient presents with    uti symptoms     Pt c/o frequent urge to urinate and burning         Patient ID: Negro Yousif is a 81 y o  female  Urinary Tract Infection   This is a new problem  The current episode started today  The problem occurs every urination  The problem has been unchanged  The quality of the pain is described as burning  The pain is at a severity of 4/10  The pain is mild  There has been no fever  There is no history of pyelonephritis  Associated symptoms include frequency and urgency  Pertinent negatives include no chills, discharge, flank pain, hematuria, hesitancy, nausea, possible pregnancy, sweats or vomiting  She has tried nothing for the symptoms  The treatment provided no relief  There is no history of catheterization, kidney stones, recurrent UTIs, a single kidney, urinary stasis or a urological procedure  The following portions of the patient's history were reviewed and updated as appropriate: allergies, current medications, past family history, past medical history, past social history, past surgical history and problem list     Review of Systems   Constitutional: Negative  Negative for chills  Gastrointestinal: Negative  Negative for abdominal pain, nausea and vomiting  Genitourinary: Positive for frequency and urgency  Negative for flank pain, hematuria and hesitancy         Current Outpatient Medications   Medication Sig Dispense Refill    albuterol (PROVENTIL HFA,VENTOLIN HFA) 90 mcg/act inhaler Inhale 2 puffs every 4 (four) hours as needed for wheezing      Breo Ellipta 100-25 MCG/INH inhaler USE 1 INHALATION BY MOUTH  DAILY RINSE MOUTH AFTER  each 3    rosuvastatin (CRESTOR) 10 MG tablet TAKE 1 TABLET BY MOUTH  DAILY AT BEDTIME 90 tablet 3    losartan (COZAAR) 50 mg tablet Take 1 tablet (50 mg total) by mouth daily 90 tablet 3    multivitamin (THERAGRAN) TABS Take 1 tablet by mouth daily      Olive Leaf Extract 500 MG CAPS Take by mouth as needed         No current facility-administered medications for this visit  Objective:    /80 (BP Location: Left arm, Patient Position: Sitting, Cuff Size: Large)   Pulse 82   Temp (!) 97 3 °F (36 3 °C)   Resp 16   Ht 5' 10" (1 778 m)   Wt 82 6 kg (182 lb 3 2 oz)   BMI 26 14 kg/m²        Physical Exam  Constitutional:       Appearance: She is not ill-appearing  Cardiovascular:      Rate and Rhythm: Normal rate  Pulmonary:      Effort: Pulmonary effort is normal  No respiratory distress  Abdominal:      Palpations: Abdomen is soft  Tenderness: There is no abdominal tenderness  There is no right CVA tenderness or left CVA tenderness  Neurological:      Mental Status: She is alert  Labs in chart were reviewed  Recent Results (from the past 672 hour(s))   POCT urine dip auto non-scope    Collection Time: 09/30/22 10:40 AM   Result Value Ref Range     COLOR,UA jay jay     CLARITY,UA cloudy     SPECIFIC GRAVITY,UA 1,015      PH,     LEUKOCYTE ESTERASE,     NITRITE,UA pos     GLUCOSE, UA norm     KETONES,UA neg     BILIRUBIN,UA neg     BLOOD,     POCT URINE PROTEIN 30     SL AMB POCT UROBILINOGEN norm        Assessment/Plan:         Diagnoses and all orders for this visit:    Urinary tract infection without hematuria, site unspecified  -     POCT urine dip auto non-scope  -     Urine culture  -     ciprofloxacin (CIPRO) 250 mg tablet; Take 1 tablet (250 mg total) by mouth every 12 (twelve) hours for 3 days          BMI Counseling: Body mass index is 26 14 kg/m²  Discussed the patient's BMI with her  The BMI is above normal  Exercise recommendations include exercising 3-5 times per week         rto in 2 wks for [facility] [employer]

## 2022-10-06 LAB
BACTERIA UR CULT: ABNORMAL
Lab: ABNORMAL
SL AMB ANTIMICROBIAL SUSCEPTIBILITY: ABNORMAL

## 2022-10-14 ENCOUNTER — RA CDI HCC (OUTPATIENT)
Dept: OTHER | Facility: HOSPITAL | Age: 72
End: 2022-10-14

## 2022-10-14 NOTE — PROGRESS NOTES
[facility]  coding opportunities       Chart reviewed, no opportunity found:   [address]        Patients Insurance     Medicare Insurance: [employer]vantage

## 2022-10-20 ENCOUNTER — APPOINTMENT (OUTPATIENT)
Dept: LAB | Facility: CLINIC | Age: 72
End: 2022-10-20
Payer: COMMERCIAL

## 2022-10-20 ENCOUNTER — TELEMEDICINE (OUTPATIENT)
Dept: FAMILY MEDICINE CLINIC | Facility: CLINIC | Age: 72
End: 2022-10-20
Payer: COMMERCIAL

## 2022-10-20 ENCOUNTER — CONSULT (OUTPATIENT)
Dept: DERMATOLOGY | Age: 72
End: 2022-10-20
Payer: COMMERCIAL

## 2022-10-20 VITALS — HEIGHT: 70 IN | WEIGHT: 177 LBS | TEMPERATURE: 98.2 F | BODY MASS INDEX: 25.34 KG/M2

## 2022-10-20 DIAGNOSIS — L08.9 SUPERFICIAL SKIN INFECTION: ICD-10-CM

## 2022-10-20 DIAGNOSIS — I10 BENIGN ESSENTIAL HYPERTENSION: ICD-10-CM

## 2022-10-20 DIAGNOSIS — E21.3 HYPERPARATHYROIDISM (HCC): ICD-10-CM

## 2022-10-20 DIAGNOSIS — E78.00 HYPERCHOLESTEROLEMIA: ICD-10-CM

## 2022-10-20 DIAGNOSIS — L02.213 CUTANEOUS ABSCESS OF CHEST WALL: ICD-10-CM

## 2022-10-20 DIAGNOSIS — Z00.00 MEDICARE ANNUAL WELLNESS VISIT, SUBSEQUENT: Primary | ICD-10-CM

## 2022-10-20 DIAGNOSIS — R79.89 ELEVATED TSH: ICD-10-CM

## 2022-10-20 DIAGNOSIS — L82.1 SEBORRHEIC KERATOSES: Primary | ICD-10-CM

## 2022-10-20 DIAGNOSIS — L72.0 EPIDERMAL CYST: ICD-10-CM

## 2022-10-20 LAB
25(OH)D3 SERPL-MCNC: 35 NG/ML (ref 30–100)
ALBUMIN SERPL BCP-MCNC: 4.2 G/DL (ref 3.5–5)
ALP SERPL-CCNC: 77 U/L (ref 46–116)
ALT SERPL W P-5'-P-CCNC: 35 U/L (ref 12–78)
ANION GAP SERPL CALCULATED.3IONS-SCNC: 3 MMOL/L (ref 4–13)
AST SERPL W P-5'-P-CCNC: 25 U/L (ref 5–45)
BILIRUB SERPL-MCNC: 0.58 MG/DL (ref 0.2–1)
BUN SERPL-MCNC: 11 MG/DL (ref 5–25)
CALCIUM SERPL-MCNC: 11.3 MG/DL (ref 8.3–10.1)
CHLORIDE SERPL-SCNC: 106 MMOL/L (ref 96–108)
CHOLEST SERPL-MCNC: 179 MG/DL
CO2 SERPL-SCNC: 27 MMOL/L (ref 21–32)
CREAT SERPL-MCNC: 0.75 MG/DL (ref 0.6–1.3)
GFR SERPL CREATININE-BSD FRML MDRD: 81 ML/MIN/1.73SQ M
GLUCOSE P FAST SERPL-MCNC: 102 MG/DL (ref 65–99)
HDLC SERPL-MCNC: 95 MG/DL
LDLC SERPL CALC-MCNC: 74 MG/DL (ref 0–100)
NONHDLC SERPL-MCNC: 84 MG/DL
POTASSIUM SERPL-SCNC: 4.4 MMOL/L (ref 3.5–5.3)
PROT SERPL-MCNC: 7.5 G/DL (ref 6.4–8.4)
SODIUM SERPL-SCNC: 136 MMOL/L (ref 135–147)
TRIGL SERPL-MCNC: 52 MG/DL
TSH SERPL DL<=0.05 MIU/L-ACNC: 3.09 UIU/ML (ref 0.45–4.5)

## 2022-10-20 PROCEDURE — 99203 OFFICE O/P NEW LOW 30 MIN: CPT | Performed by: DERMATOLOGY

## 2022-10-20 PROCEDURE — G0439 PPPS, SUBSEQ VISIT: HCPCS | Performed by: FAMILY MEDICINE

## 2022-10-20 PROCEDURE — 83970 ASSAY OF PARATHORMONE: CPT

## 2022-10-20 PROCEDURE — 80053 COMPREHEN METABOLIC PANEL: CPT

## 2022-10-20 PROCEDURE — 36415 COLL VENOUS BLD VENIPUNCTURE: CPT

## 2022-10-20 PROCEDURE — 84443 ASSAY THYROID STIM HORMONE: CPT

## 2022-10-20 PROCEDURE — 82306 VITAMIN D 25 HYDROXY: CPT

## 2022-10-20 PROCEDURE — 80061 LIPID PANEL: CPT

## 2022-10-20 NOTE — PROGRESS NOTES
[facility] Note     Patient Name: Edgar Bejarano  Encounter Date: 10/20/2022    • Have you been cared for by a [facility] in the last 3 years and, if so, which one? No    · Have you traveled outside of the [address] [place] [address] in the past 3 months or outside of the [facility] area in the last 2 weeks? No    • May we call your Preferred Phone number to discuss your specific medical information? Yes    • May we leave a detailed message that includes your specific medical information? Yes      Today's Chief Concerns:  • Concern #1:  cyst  • Concern #2:      Past Medical History:  Have you personally ever had or currently have any of the following? · Skin cancer (such as Melanoma, Basal Cell Carcinoma, Squamous Cell Carcinoma? (If Yes, please provide more detail)- No  · Eczema: No  · Psoriasis: No  · HIV/AIDS: No  · Hepatitis B or C: No  · Tuberculosis: No  · Systemic Immunosuppression such as Diabetes, Biologic or Immunotherapy, Chemotherapy, Organ Transplantation, Bone Marrow Transplantation (If YES, please provide more detail): No  · Radiation Treatment (If YES, please provide more detail): No  · Any other major medical conditions/concerns? (If Yes, which types)- No    Social History:    • What is/was your primary occupation? retired        Family History:  Have any of your "first degree relatives" (parent, brother, sister, or child) had any of the following       · Skin cancer such as Melanoma or Merkel Cell Carcinoma or Pancreatic Cancer? No  · Eczema, Asthma, Hay Fever or Seasonal Allergies: No  · Psoriasis or Psoriatic Arthritis: No  · Do any other medical conditions seem to run in your family? If Yes, what condition and which relatives?   No    Current Medications:   (please update all dermatological medications before printing patient's AVS!)      Current Outpatient Medications:   •  albuterol (PROVENTIL HFA,VENTOLIN HFA) 90 mcg/act inhaler, Inhale 2 puffs every 4 (four) hours as needed for wheezing, Disp: , Rfl:   •  Breo Ellipta 100-25 MCG/INH inhaler, USE 1 INHALATION BY MOUTH  DAILY RINSE MOUTH AFTER USE, Disp: 180 each, Rfl: 3  •  losartan (COZAAR) 50 mg tablet, Take 1 tablet (50 mg total) by mouth daily, Disp: 90 tablet, Rfl: 3  •  multivitamin (THERAGRAN) TABS, Take 1 tablet by mouth daily, Disp: , Rfl:   •  rosuvastatin (CRESTOR) 10 MG tablet, TAKE 1 TABLET BY MOUTH  DAILY AT BEDTIME, Disp: 90 tablet, Rfl: 3      Review of Systems:  Have you recently had or currently have any of the following? If YES, what are you doing for the problem? · Fever, chills or unintended weight loss: No  · Sudden loss or change in your vision: No  · Nausea, vomiting or blood in your stool: No  · Painful or swollen joints: No  · Wheezing or cough: No  · Changing mole or non-healing wound: No  · Nosebleeds: No  · Excessive sweating: No  · Easy or prolonged bleeding? No  · Over the last 2 weeks, how often have you been bothered by the following problems? · Taking little interest or pleasure in doing things: 1 - Not at All  · Feeling down, depressed, or hopeless: 1 - Not at All  · Rapid heartbeat with epinephrine:  No    · FEMALES ONLY:    · Are you pregnant or planning to become pregnant? No  · Are you currently or planning to be nursing or breast feeding? No    · Any known allergies?       Allergies   Allergen Reactions   • Molds & Smuts Shortness Of Breath     Mold and mildew   ·       Physical Exam:    • Was a chaperone (Derm Clinical Assistant) present throughout the entire Physical Exam? Yes        CONSTITUTIONAL:   Vitals:    10/20/22 1054   Height: 5' 10" (1 778 m)           PSYCH: Normal mood and affect  EYES: Normal conjunctiva  ENT: Normal lips and oral mucosa  CARDIOVASCULAR: No edema  RESPIRATORY: Normal respirations  HEME/LYMPH/IMMUNO:  No regional lymphadenopathy except as noted below in "ASSESSMENT AND PLAN BY DIAGNOSIS"    SKIN:  FULL ORGAN SYSTEM EXAM Hair, Scalp, Ears, Face Normal except as noted below in Assessment   Neck, Cervical Chain Nodes Normal except as noted below in Assessment   Right Arm/Hand/Fingers Normal except as noted below in Assessment   Left Arm/Hand/Fingers Normal except as noted below in Assessment   Chest/Breasts/Axillae Viewed areas Normal except as noted below in Assessment        Assessment and Plan by Diagnosis:    History of Present Condition:    • Duration:  How long has this been an issue for you?    o  patient here for previously inflamed cyst took antibiotic currently inflammation just need evaluation to make sure nothing further needs to get done  Present for years  • Location Affected:  Where on the body is this affecting you?    o  left upper chest  • Quality:  Is there any bleeding, pain, itch, burning/irritation, or redness associated with the skin lesion? o  none  • Severity:  Describe any bleeding, pain, itch, burning/irritation, or redness on a scale of 1 to 10 (with 10 being the worst)  o  none  • Timing:  Does this condition seem to be there pretty constantly or do you notice it more at specific times throughout the day? o  consistent  • Context:  Have you ever noticed that this condition seems to be associated with specific activities you do?    o  nothing  • Modifying Factors:    o Anything that seems to make the condition worse?    -  nothing  o What have you tried to do to make the condition better?    -  antibiotics  • Associated Signs and Symptoms:  Does this skin lesion seem to be associated with any of the following:      EPIDERMAL INCLUSION CYST    Physical Exam:  • Anatomic Location Affected:  Left upper chest  • Morphological Description:  5 mm round subcutaneous papule  • Pertinent Positives:  • Pertinent Negatives:     Additional History of Present Condition:  See above    Assessment and Plan:  Based on a thorough discussion of this condition and the management approach to it (including a comprehensive discussion of the known risks, side effects and potential benefits of treatment), the patient (family) agrees to implement the following specific plan:  • Reassured benign  • Can be removed if so desire  What are epidermal inclusion cysts? Epidermal inclusion cysts are the most common, benign cutaneous cysts  There are many different names for epidermal inclusion cysts, including epidermoid cyst, epidermal cyst, infundibular cyst, inclusion cyst, and keratin cyst  These cysts can occur anywhere on the body and typically present as nodules directly underneath the skin  There is often a visible pore or opening in the center  The cysts are freely moveable and can range from a few millimeters to several centimeters in diameter  The center of epidermoid cysts almost always contains keratin, which has a cheesy appearance, and not sebum  They also do not originate from sebaceous glands  Therefore, epidermal inclusion cysts are not the same as sebaceous cysts  Cysts may remain stable or progressively enlarge over time  There are no reliable predictive factors to tell if an epidermal inclusion cyst will enlarge, become inflamed, or remain quiescent  Infected cysts tend to become larger, turn red, and are more noticeable to the patient  There may be accompanying pain and discomfort  What causes epidermal inclusion cysts? Epidermal inclusion cysts often appear out of the blue and are not contagious  They are due to a proliferation of epidermal cells within the dermis and are more common in men than women  They occur more frequently in patients in their 20s to 45s  Epidermal inclusion cysts by themselves are usually not inherited, but they can be hereditary in rare syndromes such as Masters syndrome, nodular elastosis with cysts and comedones (Favre-Racouchot syndrome), and basal cell nevus syndrome (Gorlin syndrome)   Elderly patients with chronic sun-damaged skin areas have a higher likelihood of developing epidermoid cysts  They often occur in areas where hair follicles have been inflamed or repeatedly irritated are more frequent in patients with acne vulgaris  In the  period, they are called milia  Patients on BRAF inhibitors such as imiquimod and cyclosporine have a higher incidence of epidermoid cysts of the face  How do we diagnose an epidermal inclusion cyst?  Epidermoid inclusion cysts are often diagnosed by history and physical exam  There is usually no need for biopsy prior to removal   Radiographic and laboratory exams, such as ultrasound studies, are unnecessary and not typically ordered unless the practitioner suspects a genetic condition  What is the treatment for an epidermal inclusion cyst?  Inflamed, uninfected epidermal inclusion cysts rarely resolve spontaneously without therapy or surgical intervention  Treatment is not emergent unless desired by the patient  Definitive treatment is via surgical excision with walls intact  This method will prevent recurrence  This is best done when the cyst is not inflamed, to decrease the probability of rupture during surgery  - A local anesthetic will be injected around the cyst  - A small incision is made in the skin overlying the cyst, and contents are expressed  - The incision is repaired with sutures    Another option is to use a 4mm punch biopsy with cyst extraction through the defect  Incision and drainage is often needed if the cyst is infected or inflamed  If there is surrounding cellulitis, oral antibiotic therapy may be necessary  The common agents used target methicillin sensitive Staphylococcal aureus and methicillin resistant S aureus in areas of high prevalence       SEBORRHEIC KERATOSIS; NON-INFLAMED    Physical Exam:  • Anatomic Location Affected:  Right forearm  • Morphological Description:  Flat and raised, waxy, smooth to warty textured, yellow to brownish-grey to dark brown to blackish, discrete, "stuck-on" appearing papules  • Pertinent Positives:  • Pertinent Negatives: Additional History of Present Condition:  Patient reports new bumps on the skin  Denies itch, burn, pain, bleeding or ulceration  Present constantly; nothing seems to make it worse or better  No prior treatment  Assessment and Plan:  Based on a thorough discussion of this condition and the management approach to it (including a comprehensive discussion of the known risks, side effects and potential benefits of treatment), the patient (family) agrees to implement the following specific plan:  • Reassured benign    Seborrheic Keratosis  A seborrheic keratosis is a harmless warty spot that appears during adult life as a common sign of skin aging  Seborrheic keratoses can arise on any area of skin, covered or uncovered, with the usual exception of the palms and soles  They do not arise from mucous membranes  Seborrheic keratoses can have highly variable appearance  Seborrheic keratoses are extremely common  It has been estimated that over 90% of adults over the age of 61 years have one or more of them  They occur in males and females of all races, typically beginning to erupt in the 35s or 45s  They are uncommon under the age of 21 years  The precise cause of seborrhoeic keratoses is not known  Seborrhoeic keratoses are considered degenerative in nature  As time goes by, seborrheic keratoses tend to become more numerous  Some people inherit a tendency to develop a very large number of them; some people may have hundreds of them  The name "seborrheic keratosis" is misleading, because these lesions are not limited to a seborrhoeic distribution (scalp, mid-face, chest, upper back), nor are they formed from sebaceous glands, nor are they associated with sebum -- which is greasy    Seborrheic keratosis may also be called "SK," "Seb K," "basal cell papilloma," "senile wart," or "barnacle "      Researchers have noted:  • Eruptive seborrhoeic keratoses can follow sunburn or dermatitis  • Skin friction may be the reason they appear in body folds  • Viral cause (e g , human papillomavirus) seems unlikely  • Stable and clonal mutations or activation of FRFR3, PIK3CA, ARELI, AKT1 and EGFR genes are found in seborrhoeic keratoses  • Seborrhoeic keratosis can arise from solar lentigo  • FRFR3 mutations also arise in solar lentigines  These mutations are associated with increased age and location on the head and neck, suggesting a role of ultraviolet radiation in these lesions  • Seborrheic keratoses do not harbour tumour suppressor gene mutations  • Epidermal growth factor receptor inhibitors, which are used to treat some cancers, often result in an increase in verrucal (warty) keratoses  There is no easy way to remove multiple lesions on a single occasion  Unless a specific lesion is "inflamed" and is causing pain or stinging/burning or is bleeding, most insurance companies do not authorize treatment      Scribe Attestation    I,:  Niya Diane am acting as a scribe while in the presence of the attending physician :       I,:  Yosef Barrera MD personally performed the services described in this documentation    as scribed in my presence :

## 2022-10-20 NOTE — PATIENT INSTRUCTIONS
Medicare Preventive Visit Patient Instructions  Thank you for completing your Welcome to Medicare Visit or Medicare Annual Wellness Visit today  Your next wellness visit will be due in one year (10/21/2023)  The screening/preventive services that you may require over the next 5-10 years are detailed below  Some tests may not apply to you based off risk factors and/or age  Screening tests ordered at today's visit but not completed yet may show as past due  Also, please note that scanned in results may not display below  Preventive Screenings:  Service Recommendations Previous Testing/Comments   Colorectal Cancer Screening  * Colonoscopy    * Fecal Occult Blood Test (FOBT)/Fecal Immunochemical Test (FIT)  * Fecal DNA/Cologuard Test  * Flexible Sigmoidoscopy Age: 39-70 years old   Colonoscopy: every 10 years (may be performed more frequently if at higher risk)  OR  FOBT/FIT: every 1 year  OR  Cologuard: every 3 years  OR  Sigmoidoscopy: every 5 years  Screening may be recommended earlier than age 39 if at higher risk for colorectal cancer  Also, an individualized decision between you and your healthcare provider will decide whether screening between the ages of 74-80 would be appropriate  Colonoscopy: 06/27/2014  FOBT/FIT: Not on file  Cologuard: Not on file  Sigmoidoscopy: Not on file          Breast Cancer Screening Age: 36 years old  Frequency: every 1-2 years  Not required if history of left and right mastectomy Mammogram: 04/22/2021        Cervical Cancer Screening Between the ages of 21-29, pap smear recommended once every 3 years  Between the ages of 33-67, can perform pap smear with HPV co-testing every 5 years     Recommendations may differ for women with a history of total hysterectomy, cervical cancer, or abnormal pap smears in past  Pap Smear: Not on file        Hepatitis C Screening Once for adults born between [facility]  More frequently in patients at high risk for Hepatitis C Hep C Antibody: Not on file        Diabetes Screening 1-2 times per year if you're at risk for diabetes or have pre-diabetes Fasting glucose: 91 mg/dL (10/25/2019)  A1C: No results in last 5 years (No results in last 5 years)      Cholesterol Screening Once every 5 years if you don't have a lipid disorder  May order more often based on risk factors  Lipid panel: 10/25/2019          Other Preventive Screenings Covered by Medicare:  1  Abdominal Aortic Aneurysm (AAA) Screening: covered once if your at risk  You're considered to be at risk if you have a family history of AAA  2  Lung Cancer Screening: covers low dose CT scan once per year if you meet all of the following conditions: (1) Age 50-69; (2) No signs or symptoms of lung cancer; (3) Current smoker or have quit smoking within the last 15 years; (4) You have a tobacco smoking history of at least 20 pack years (packs per day multiplied by number of years you smoked); (5) You get a written order from a healthcare provider  3  Glaucoma Screening: covered annually if you're considered high risk: (1) You have diabetes OR (2) Family history of glaucoma OR (3)  aged 48 and older OR (3)  American aged 72 and older  3  Osteoporosis Screening: covered every 2 years if you meet one of the following conditions: (1) You're estrogen deficient and at risk for osteoporosis based off medical history and other findings; (2) Have a vertebral abnormality; (3) On glucocorticoid therapy for more than 3 months; (4) Have primary hyperparathyroidism; (5) On osteoporosis medications and need to assess response to drug therapy  · Last bone density test (DXA Scan): 06/28/2016   5  HIV Screening: covered annually if you're between the age of 15-65  Also covered annually if you are younger than 13 and older than 72 with risk factors for HIV infection  For pregnant patients, it is covered up to 3 times per pregnancy      Immunizations:  Immunization Recommendations   Influenza Vaccine Annual influenza vaccination during flu season is recommended for all persons aged >= 6 months who do not have contraindications   Pneumococcal Vaccine   * Pneumococcal conjugate vaccine = PCV13 (Prevnar 13), PCV15 (Vaxneuvance), PCV20 (Prevnar 20)  * Pneumococcal polysaccharide vaccine = PPSV23 (Pneumovax) Adults 25-60 years old: 1-3 doses may be recommended based on certain risk factors  Adults 72 years old: 1-2 doses may be recommended based off what pneumonia vaccine you previously received   Hepatitis B Vaccine 3 dose series if at intermediate or high risk (ex: diabetes, end stage renal disease, liver disease)   Tetanus (Td) Vaccine - COST NOT COVERED BY MEDICARE PART B Following completion of primary series, a booster dose should be given every 10 years to maintain immunity against tetanus  Td may also be given as tetanus wound prophylaxis  Tdap Vaccine - COST NOT COVERED BY MEDICARE PART B Recommended at least once for all adults  For pregnant patients, recommended with each pregnancy  Shingles Vaccine (Shingrix) - COST NOT COVERED BY MEDICARE PART B  2 shot series recommended in those aged 48 and above     Health Maintenance Due:      Topic Date Due   • Hepatitis C Screening  Never done   • Breast Cancer Screening: Mammogram  04/22/2022   • Colorectal Cancer Screening  06/27/2024     Immunizations Due:      Topic Date Due   • Pneumococcal Vaccine: 65+ Years (1 - PCV) Never done   • COVID-19 Vaccine (4 - Booster for Moderna series) 02/26/2022   • Influenza Vaccine (1) 09/01/2022     Advance Directives   What are advance directives? Advance directives are legal documents that state your wishes and plans for medical care  These plans are made ahead of time in case you lose your ability to make decisions for yourself  Advance directives can apply to any medical decision, such as the treatments you want, and if you want to donate organs  What are the types of advance directives?   There are many types of advance directives, and each state has rules about how to use them  You may choose a combination of any of the following:  · Living will: This is a written record of the treatment you want  You can also choose which treatments you do not want, which to limit, and which to stop at a certain time  This includes surgery, medicine, IV fluid, and tube feedings  · Durable power of  for healthcare [facility]): This is a written record that states who you want to make healthcare choices for you when you are unable to make them for yourself  This person, called a proxy, is usually a family member or a friend  You may choose more than 1 proxy  · Do not resuscitate (DNR) order:  A DNR order is used in case your heart stops beating or you stop breathing  It is a request not to have certain forms of treatment, such as CPR  A DNR order may be included in other types of advance directives  · Medical directive: This covers the care that you want if you are in a coma, near death, or unable to make decisions for yourself  You can list the treatments you want for each condition  Treatment may include pain medicine, surgery, blood transfusions, dialysis, IV or tube feedings, and a ventilator (breathing machine)  · Values history: This document has questions about your views, beliefs, and how you feel and think about life  This information can help others choose the care that you would choose  Why are advance directives important? An advance directive helps you control your care  Although spoken wishes may be used, it is better to have your wishes written down  Spoken wishes can be misunderstood, or not followed  Treatments may be given even if you do not want them  An advance directive may make it easier for your family to make difficult choices about your care     Weight Management   Why it is important to manage your weight:  Being overweight increases your risk of health conditions such as heart disease, high blood pressure, type 2 diabetes, and certain types of cancer  It can also increase your risk for osteoarthritis, sleep apnea, and other respiratory problems  Aim for a slow, steady weight loss  Even a small amount of weight loss can lower your risk of health problems  How to lose weight safely:  A safe and healthy way to lose weight is to eat fewer calories and get regular exercise  You can lose up about 1 pound a week by decreasing the number of calories you eat by 500 calories each day  Healthy meal plan for weight management:  A healthy meal plan includes a variety of foods, contains fewer calories, and helps you stay healthy  A healthy meal plan includes the following:  · Eat whole-grain foods more often  A healthy meal plan should contain fiber  Fiber is the part of grains, fruits, and vegetables that is not broken down by your body  Whole-grain foods are healthy and provide extra fiber in your diet  Some examples of whole-grain foods are whole-wheat breads and pastas, oatmeal, brown rice, and bulgur  · Eat a variety of vegetables every day  Include dark, leafy greens such as spinach, kale, ata greens, and mustard greens  Eat yellow and orange vegetables such as carrots, sweet potatoes, and winter squash  · Eat a variety of fruits every day  Choose fresh or canned fruit (canned in its own juice or light syrup) instead of juice  Fruit juice has very little or no fiber  · Eat low-fat dairy foods  Drink fat-free (skim) milk or 1% milk  Eat fat-free yogurt and low-fat cottage cheese  Try low-fat cheeses such as mozzarella and other reduced-fat cheeses  · Choose meat and other protein foods that are low in fat  Choose beans or other legumes such as split peas or lentils  Choose fish, skinless poultry (chicken or turkey), or lean cuts of red meat (beef or pork)  Before you cook meat or poultry, cut off any visible fat  · Use less fat and oil  Try baking foods instead of frying them   Add less fat, such as margarine, sour cream, regular salad dressing and mayonnaise to foods  Eat fewer high-fat foods  Some examples of high-fat foods include french fries, doughnuts, ice cream, and cakes  · Eat fewer sweets  Limit foods and drinks that are high in sugar  This includes candy, cookies, regular soda, and sweetened drinks  Exercise:  Exercise at least 30 minutes per day on most days of the week  Some examples of exercise include walking, biking, dancing, and swimming  You can also fit in more physical activity by taking the stairs instead of the elevator or parking farther away from stores  Ask your healthcare provider about the best exercise plan for you  © Copyright [employer] 2018 Information is for End User's use only and may not be sold, redistributed or otherwise used for commercial purposes   All illustrations and images included in CareNotes® are the copyrighted property of A D A M , Inc  or [address] [place] [address]

## 2022-10-20 NOTE — PROGRESS NOTES
Assessment and Plan:     Problem List Items Addressed This Visit        Endocrine    Hyperparathyroidism ([address] )    Relevant Orders    PTH, intact    Vitamin D 25 hydroxy       Cardiovascular and Mediastinum    Benign essential hypertension    Relevant Orders    Comprehensive metabolic panel       Other    Hypercholesterolemia    Relevant Orders    Lipid panel      Other Visit Diagnoses     Medicare annual wellness visit, subsequent    -  Primary    Elevated TSH        Relevant Orders    TSH, 3rd generation           Preventive health issues were discussed with patient, and age appropriate screening tests were ordered as noted in patient's After Visit Summary  Personalized health advice and appropriate referrals for health education or preventive services given if needed, as noted in patient's After Visit Summary       History of Present Illness:     Patient presents for a Medicare Wellness Visit    HPI   Patient Care Team:  Terese Redmond MD as PCP - Ave Mckeon MD     Review of Systems:     Review of Systems     Problem List:     Patient Active Problem List   Diagnosis   • Asthma   • Benign essential hypertension   • Hypercholesterolemia   • Hyperparathyroidism [facility])   • Seasonal allergic rhinitis due to pollen      Past Medical and Surgical History:     Past Medical History:   Diagnosis Date   • Abnormal inflammatory bowel disease panel     last assessed: 09/03/14   • Asthma    • Chest wall contusion     last assessed: 04/21/17   • Dermatomycosis 08/02/2011   • Hordeolum internum of left eye     last assessed: 06/20/13   • Hx of oral aphthous ulcers 08/22/2011   • Hypercalcemia 01/11/2011   • Hyperlipidemia    • Hypertension    • Sciatica 05/02/2008   • Ulceration of intestine     last assessed: 9/03/14     Past Surgical History:   Procedure Laterality Date   • MYRINGOTOMY Bilateral       Family History:     Family History   Problem Relation Age of Onset   • No Known Problems Mother macular degeneration per allscripts   • No Known Problems Father    • No Known Problems Maternal Aunt    • No Known Problems Paternal Aunt       Social History:     Social History     Socioeconomic History   • Marital status:      Spouse name: None   • Number of children: None   • Years of education: None   • Highest education level: None   Occupational History   • None   Tobacco Use   • Smoking status: Former Smoker     Packs/day: 2 00     Years: 20 00     Pack years: 40 00     Quit date: 2005     Years since quittin 3   • Smokeless tobacco: Never Used   Vaping Use   • Vaping Use: Never used   Substance and Sexual Activity   • Alcohol use: Yes     Comment: occasional    • Drug use: No   • Sexual activity: None   Other Topics Concern   • None   Social History Narrative   • None     Social Determinants of Health     Financial Resource Strain: Low Risk    • Difficulty of Paying Living Expenses: Not hard at all   Food Insecurity: Not on file   Transportation Needs: No Transportation Needs   • Lack of Transportation (Medical): No   • Lack of Transportation (Non-Medical): No   Physical Activity: Not on file   Stress: Not on file   Social Connections: Not on file   Intimate Partner Violence: Not on file   Housing Stability: Not on file      Medications and Allergies:     Current Outpatient Medications   Medication Sig Dispense Refill   • albuterol (PROVENTIL HFA,VENTOLIN HFA) 90 mcg/act inhaler Inhale 2 puffs every 4 (four) hours as needed for wheezing     • Breo Ellipta 100-25 MCG/INH inhaler USE 1 INHALATION BY MOUTH  DAILY RINSE MOUTH AFTER  each 3   • losartan (COZAAR) 50 mg tablet Take 1 tablet (50 mg total) by mouth daily 90 tablet 3   • multivitamin (THERAGRAN) TABS Take 1 tablet by mouth daily     • rosuvastatin (CRESTOR) 10 MG tablet TAKE 1 TABLET BY MOUTH  DAILY AT BEDTIME 90 tablet 3     No current facility-administered medications for this visit       Allergies   Allergen Reactions   • Molds & Smuts Shortness Of Breath     Mold and mildew      Immunizations:     Immunization History   Administered Date(s) Administered   • COVID-19 MODERNA VACC 0 5 ML IM 02/06/2021, 03/08/2021, 10/26/2021   • Influenza Quadrivalent Preservative Free 3 years and older IM 02/16/2017   • Influenza, high dose seasonal 0 7 mL 10/20/2020      Health Maintenance:         Topic Date Due   • Hepatitis C Screening  Never done   • Breast Cancer Screening: Mammogram  04/22/2022   • Colorectal Cancer Screening  06/27/2024         Topic Date Due   • Pneumococcal Vaccine: 65+ Years (1 - PCV) Never done   • COVID-19 Vaccine (4 - Booster for Moderna series) 02/26/2022   • Influenza Vaccine (1) 09/01/2022      Medicare Screening Tests and Risk Assessments:     Safia is here for her Subsequent Wellness visit  Health Risk Assessment:   Patient rates overall health as good  Patient feels that their physical health rating is same  Patient is satisfied with their life  Eyesight was rated as same  Hearing was rated as same  Patient feels that their emotional and mental health rating is same  Patients states they are never, rarely angry  Patient states they are never, rarely unusually tired/fatigued  Pain experienced in the last 7 days has been none  Patient states that she has experienced no weight loss or gain in last 6 months  Fall Risk Screening: In the past year, patient has experienced: history of falling in past year    Number of falls: 2 or more  Injured during fall?: No    Feels unsteady when standing or walking?: No    Worried about falling?: No      Urinary Incontinence Screening:   Patient has not leaked urine accidently in the last six months  Home Safety:  Patient does not have trouble with stairs inside or outside of their home  Patient has working smoke alarms and has working carbon monoxide detector  Home safety hazards include: none  Nutrition:   Current diet is Regular       Medications:   Patient is currently taking over-the-counter supplements  OTC medications include: see medication list  Patient is able to manage medications  Activities of Daily Living (ADLs)/Instrumental Activities of Daily Living (IADLs):   Walk and transfer into and out of bed and chair?: Yes  Dress and groom yourself?: Yes    Bathe or shower yourself?: Yes    Feed yourself? Yes  Do your laundry/housekeeping?: Yes  Manage your money, pay your bills and track your expenses?: Yes  Make your own meals?: Yes    Do your own shopping?: Yes    Previous Hospitalizations:   Any hospitalizations or ED visits within the last 12 months?: No      Advance Care Planning:   Living will: No    Durable POA for healthcare: No    Advanced directive: No    Advanced directive counseling given: Yes      Cognitive Screening:   Provider or family/friend/caregiver concerned regarding cognition?: No    PREVENTIVE SCREENINGS      Cardiovascular Screening:    General: Screening Not Indicated and History Lipid Disorder    Due for: Lipid Panel      Diabetes Screening:       Due for: Blood Glucose      Colorectal Cancer Screening:     General: Screening Current      Breast Cancer Screening:     General: Screening Current and Patient Declines      Cervical Cancer Screening:    General: Screening Not Indicated      Osteoporosis Screening:    General: Patient Declines      Abdominal Aortic Aneurysm (AAA) Screening:        General: Screening Not Indicated      Lung Cancer Screening:     General: Screening Not Indicated      Hepatitis C Screening:    General: Screening Not Indicated    Screening, Brief Intervention, and Referral to Treatment (SBIRT)    Screening  Typical number of drinks in a day: 1  Typical number of drinks in a week: 7  Interpretation: Low risk drinking behavior      Single Item Drug Screening:  How often have you used an illegal drug (including marijuana) or a prescription medication for non-medical reasons in the past year? never    Single Item Drug Screen Score: 0  Interpretation: Negative screen for possible drug use disorder    No exam data present     Physical Exam:     There were no vitals taken for this visit  Physical Exam     Bryson Cruz MD  Virtual AWV Consent    Verification of patient location:    Patient is located in the [facility] in which I hold an active license [place]    Reason for visit is AWV    Encounter provider Bryson Cruz MD    Provider located at [address] [place] [id number]      Recent Visits  No visits were found meeting these conditions  Showing recent visits within past 7 days and meeting all other requirements  Today's Visits  Date Type Provider Dept   10/20/22 Telemedicine Bryson [facility]  [facility] today's visits and meeting all other requirements  Future Appointments  No visits were found meeting these conditions  Showing future appointments within next 150 days and meeting all other requirements       After connecting through [employer], the patient was identified by name and date of birth  Safia Moncada was informed that this is a telemedicine visit and that the visit is being conducted through the [address] [employer] platform  She agrees to proceed  My office door was closed  No one else was in the room  She acknowledged consent and understanding of privacy and security of the video platform  Safia Moncada verbally agrees to participate in [employer]  Pt is aware that [employer] could be limited without vital signs or the ability to perform a full hands-on physical exam  Safia Moncada understands she or the provider may request at any time to terminate the video visit and request the patient to seek care or treatment in person  Patient is aware this is a billable service

## 2022-10-20 NOTE — PATIENT INSTRUCTIONS
EPIDERMAL INCLUSION CYST    Assessment and Plan:  Based on a thorough discussion of this condition and the management approach to it (including a comprehensive discussion of the known risks, side effects and potential benefits of treatment), the patient (family) agrees to implement the following specific plan:  Reassured benign  Can be removed if so desire  What are epidermal inclusion cysts? Epidermal inclusion cysts are the most common, benign cutaneous cysts  There are many different names for epidermal inclusion cysts, including epidermoid cyst, epidermal cyst, infundibular cyst, inclusion cyst, and keratin cyst  These cysts can occur anywhere on the body and typically present as nodules directly underneath the skin  There is often a visible pore or opening in the center  The cysts are freely moveable and can range from a few millimeters to several centimeters in diameter  The center of epidermoid cysts almost always contains keratin, which has a cheesy appearance, and not sebum  They also do not originate from sebaceous glands  Therefore, epidermal inclusion cysts are not the same as sebaceous cysts  Cysts may remain stable or progressively enlarge over time  There are no reliable predictive factors to tell if an epidermal inclusion cyst will enlarge, become inflamed, or remain quiescent  Infected cysts tend to become larger, turn red, and are more noticeable to the patient  There may be accompanying pain and discomfort  What causes epidermal inclusion cysts? Epidermal inclusion cysts often appear out of the blue and are not contagious  They are due to a proliferation of epidermal cells within the dermis and are more common in men than women  They occur more frequently in patients in their 20s to 45s   Epidermal inclusion cysts by themselves are usually not inherited, but they can be hereditary in rare syndromes such as Masters syndrome, nodular elastosis with cysts and comedones (Favre-Racouchot syndrome), and basal cell nevus syndrome (Gorlin syndrome)  Elderly patients with chronic sun-damaged skin areas have a higher likelihood of developing epidermoid cysts  They often occur in areas where hair follicles have been inflamed or repeatedly irritated are more frequent in patients with acne vulgaris  In the  period, they are called milia  Patients on BRAF inhibitors such as imiquimod and cyclosporine have a higher incidence of epidermoid cysts of the face  How do we diagnose an epidermal inclusion cyst?  Epidermoid inclusion cysts are often diagnosed by history and physical exam  There is usually no need for biopsy prior to removal   Radiographic and laboratory exams, such as ultrasound studies, are unnecessary and not typically ordered unless the practitioner suspects a genetic condition  What is the treatment for an epidermal inclusion cyst?  Inflamed, uninfected epidermal inclusion cysts rarely resolve spontaneously without therapy or surgical intervention  Treatment is not emergent unless desired by the patient  Definitive treatment is via surgical excision with walls intact  This method will prevent recurrence  This is best done when the cyst is not inflamed, to decrease the probability of rupture during surgery  A local anesthetic will be injected around the cyst  A small incision is made in the skin overlying the cyst, and contents are expressed  The incision is repaired with sutures    Another option is to use a 4mm punch biopsy with cyst extraction through the defect  Incision and drainage is often needed if the cyst is infected or inflamed  If there is surrounding cellulitis, oral antibiotic therapy may be necessary  The common agents used target methicillin sensitive Staphylococcal aureus and methicillin resistant S aureus in areas of high prevalence       SEBORRHEIC KERATOSIS; NON-INFLAMED    Assessment and Plan:  Based on a thorough discussion of this condition and the management approach to it (including a comprehensive discussion of the known risks, side effects and potential benefits of treatment), the patient (family) agrees to implement the following specific plan:  Reassured benign    Seborrheic Keratosis  A seborrheic keratosis is a harmless warty spot that appears during adult life as a common sign of skin aging  Seborrheic keratoses can arise on any area of skin, covered or uncovered, with the usual exception of the palms and soles  They do not arise from mucous membranes  Seborrheic keratoses can have highly variable appearance  Seborrheic keratoses are extremely common  It has been estimated that over 90% of adults over the age of 61 years have one or more of them  They occur in males and females of all races, typically beginning to erupt in the 35s or 45s  They are uncommon under the age of 21 years  The precise cause of seborrhoeic keratoses is not known  Seborrhoeic keratoses are considered degenerative in nature  As time goes by, seborrheic keratoses tend to become more numerous  Some people inherit a tendency to develop a very large number of them; some people may have hundreds of them  The name "seborrheic keratosis" is misleading, because these lesions are not limited to a seborrhoeic distribution (scalp, mid-face, chest, upper back), nor are they formed from sebaceous glands, nor are they associated with sebum -- which is greasy    Seborrheic keratosis may also be called "SK," "Seb K," "basal cell papilloma," "senile wart," or "barnacle "      Researchers have noted:  Eruptive seborrhoeic keratoses can follow sunburn or dermatitis  Skin friction may be the reason they appear in body folds  Viral cause (e g , human papillomavirus) seems unlikely  Stable and clonal mutations or activation of FRFR3, PIK3CA, ARELI, AKT1 and EGFR genes are found in seborrhoeic keratoses  Seborrhoeic keratosis can arise from solar lentigo  FRFR3 mutations also arise in solar lentigines  These mutations are associated with increased age and location on the head and neck, suggesting a role of ultraviolet radiation in these lesions  Seborrheic keratoses do not harbour tumour suppressor gene mutations  Epidermal growth factor receptor inhibitors, which are used to treat some cancers, often result in an increase in verrucal (warty) keratoses  There is no easy way to remove multiple lesions on a single occasion  Unless a specific lesion is "inflamed" and is causing pain or stinging/burning or is bleeding, most insurance companies do not authorize treatment

## 2022-10-21 LAB — PTH-INTACT SERPL-MCNC: 154.6 PG/ML (ref 18.4–80.1)

## 2022-12-14 DIAGNOSIS — J45.30 MILD PERSISTENT ASTHMA WITHOUT COMPLICATION: ICD-10-CM

## 2022-12-16 RX ORDER — FLUTICASONE FUROATE AND VILANTEROL TRIFENATATE 100; 25 UG/1; UG/1
POWDER RESPIRATORY (INHALATION)
Qty: 180 EACH | Refills: 3 | Status: SHIPPED | OUTPATIENT
Start: 2022-12-16

## 2023-01-12 ENCOUNTER — OFFICE VISIT (OUTPATIENT)
Dept: FAMILY MEDICINE CLINIC | Facility: CLINIC | Age: 73
End: 2023-01-12

## 2023-01-12 VITALS
HEIGHT: 71 IN | HEART RATE: 84 BPM | WEIGHT: 181.8 LBS | DIASTOLIC BLOOD PRESSURE: 64 MMHG | SYSTOLIC BLOOD PRESSURE: 126 MMHG | TEMPERATURE: 97.8 F | RESPIRATION RATE: 16 BRPM | BODY MASS INDEX: 25.45 KG/M2

## 2023-01-12 DIAGNOSIS — M25.531 WRIST PAIN, ACUTE, RIGHT: Primary | ICD-10-CM

## 2023-01-12 RX ORDER — METHYLPREDNISOLONE 4 MG/1
TABLET ORAL
Qty: 21 EACH | Refills: 0 | Status: SHIPPED | OUTPATIENT
Start: 2023-01-12

## 2023-01-12 NOTE — PROGRESS NOTES
Chief Complaint   Patient presents with   • Wrist Pain     Right wrist pain x 3 weeks         Patient ID: Jia Jones is a 81 y o  female  HPI  Pt is seen for R wrist pain x 2 wks -  No therapy tried, worsening with twisting movement  -  No swelling or redness  - not better -  No injury reported     The following portions of the patient's history were reviewed and updated as appropriate: allergies, current medications, past family history, past medical history, past social history, past surgical history and problem list     Review of Systems   All other systems reviewed and are negative  Current Outpatient Medications   Medication Sig Dispense Refill   • albuterol (PROVENTIL HFA,VENTOLIN HFA) 90 mcg/act inhaler Inhale 2 puffs every 4 (four) hours as needed for wheezing     • Breo Ellipta 100-25 MCG/ACT inhaler USE 1 INHALATION BY MOUTH  ONCE DAILY AT THE SAME TIME EACH DAY   RINSE MOUTH  AFTER  each 3   • losartan (COZAAR) 50 mg tablet Take 1 tablet (50 mg total) by mouth daily 90 tablet 3   • multivitamin (THERAGRAN) TABS Take 1 tablet by mouth daily     • rosuvastatin (CRESTOR) 10 MG tablet TAKE 1 TABLET BY MOUTH  DAILY AT BEDTIME 90 tablet 3     No current facility-administered medications for this visit  Objective:    /64 (BP Location: Left arm, Patient Position: Sitting, Cuff Size: Large)   Pulse 84   Temp 97 8 °F (36 6 °C)   Resp 16   Ht 5' 11" (1 803 m)   Wt 82 5 kg (181 lb 12 8 oz)   BMI 25 36 kg/m²        Physical Exam  Musculoskeletal:      Right forearm: Normal       Right wrist: No swelling, deformity, tenderness or bony tenderness  Normal range of motion        Right hand: Normal                  Assessment/Plan:         Diagnoses and all orders for this visit:    Wrist pain, acute, right  -     XR wrist 3+ vw right; Future  -     methylPREDNISolone 4 MG tablet therapy pack; Use as directed on package      Voltaren gel  Wrist brace     rto prn               Royce Forbes Ami Pruitt MD

## 2023-05-12 ENCOUNTER — OFFICE VISIT (OUTPATIENT)
Dept: FAMILY MEDICINE CLINIC | Facility: CLINIC | Age: 73
End: 2023-05-12

## 2023-05-12 VITALS
WEIGHT: 178.4 LBS | TEMPERATURE: 97.6 F | BODY MASS INDEX: 24.98 KG/M2 | RESPIRATION RATE: 18 BRPM | HEIGHT: 71 IN | SYSTOLIC BLOOD PRESSURE: 136 MMHG | DIASTOLIC BLOOD PRESSURE: 80 MMHG | HEART RATE: 72 BPM

## 2023-05-12 DIAGNOSIS — G89.29 CHRONIC RIGHT SHOULDER PAIN: Primary | ICD-10-CM

## 2023-05-12 DIAGNOSIS — M25.511 CHRONIC RIGHT SHOULDER PAIN: Primary | ICD-10-CM

## 2023-05-12 DIAGNOSIS — Z12.31 ENCOUNTER FOR SCREENING MAMMOGRAM FOR MALIGNANT NEOPLASM OF BREAST: ICD-10-CM

## 2023-05-12 NOTE — PROGRESS NOTES
Chief Complaint   Patient presents with   • Shoulder Pain     Patient complains of right should pain x 4 months        Patient ID: Priya Aranda is a 81 y o  female  Shoulder Pain   The pain is present in the right shoulder  This is a chronic problem  The current episode started more than 1 year ago  There has been no history of extremity trauma  The problem occurs constantly  The problem has been gradually worsening  The quality of the pain is described as aching  The pain is at a severity of 4/10  The pain is moderate  Associated symptoms include a limited range of motion  Pertinent negatives include no fever, itching, joint locking, joint swelling, numbness, stiffness or tingling  The symptoms are aggravated by activity  She has tried nothing for the symptoms  The treatment provided no relief  Family history does not include rheumatoid arthritis  There is no history of osteoarthritis  The following portions of the patient's history were reviewed and updated as appropriate: allergies, current medications, past family history, past medical history, past social history, past surgical history and problem list     Review of Systems   Constitutional: Negative for fever  Musculoskeletal: Positive for arthralgias  Negative for back pain, gait problem, joint swelling, myalgias, neck pain, neck stiffness and stiffness  Skin: Negative  Negative for itching  Neurological: Negative for tingling and numbness  Current Outpatient Medications   Medication Sig Dispense Refill   • albuterol (PROVENTIL HFA,VENTOLIN HFA) 90 mcg/act inhaler Inhale 2 puffs every 4 (four) hours as needed for wheezing     • Breo Ellipta 100-25 MCG/ACT inhaler USE 1 INHALATION BY MOUTH  ONCE DAILY AT THE SAME TIME EACH DAY    RINSE MOUTH  AFTER  each 3   • losartan (COZAAR) 50 mg tablet Take 1 tablet (50 mg total) by mouth daily 90 tablet 3   • multivitamin (THERAGRAN) TABS Take 1 tablet by mouth daily     • rosuvastatin "(CRESTOR) 10 MG tablet TAKE 1 TABLET BY MOUTH  DAILY AT BEDTIME 90 tablet 3     No current facility-administered medications for this visit  Objective:    /80 (BP Location: Left arm, Patient Position: Sitting, Cuff Size: Large)   Pulse 72   Temp 97 6 °F (36 4 °C)   Resp 18   Ht 5' 11\" (1 803 m)   Wt 80 9 kg (178 lb 6 4 oz)   BMI 24 88 kg/m²        Physical Exam  Constitutional:       General: She is not in acute distress  Appearance: She is not ill-appearing  Musculoskeletal:      Right shoulder: Crepitus present  No swelling or deformity  Decreased range of motion  Right lower leg: No edema  Left lower leg: No edema  Neurological:      Mental Status: She is alert  Assessment/Plan:         Diagnoses and all orders for this visit:    Chronic right shoulder pain  -     XR shoulder 2+ vw right; Future  -     Ambulatory Referral to Orthopedic Surgery; Future  -     Ambulatory Referral to Physical Therapy;  Future    Encounter for screening mammogram for malignant neoplasm of breast  -     Mammo screening bilateral w 3d & cad; Future        rto prn                   Ruth Cobian MD      "

## 2023-05-17 ENCOUNTER — APPOINTMENT (OUTPATIENT)
Dept: RADIOLOGY | Facility: CLINIC | Age: 73
End: 2023-05-17

## 2023-05-17 DIAGNOSIS — M25.511 CHRONIC RIGHT SHOULDER PAIN: ICD-10-CM

## 2023-05-17 DIAGNOSIS — G89.29 CHRONIC RIGHT SHOULDER PAIN: ICD-10-CM

## 2023-05-25 DIAGNOSIS — I10 BENIGN ESSENTIAL HYPERTENSION: ICD-10-CM

## 2023-05-26 RX ORDER — LOSARTAN POTASSIUM 50 MG/1
TABLET ORAL
Qty: 90 TABLET | Refills: 3 | Status: SHIPPED | OUTPATIENT
Start: 2023-05-26

## 2023-06-08 ENCOUNTER — OFFICE VISIT (OUTPATIENT)
Dept: OBGYN CLINIC | Facility: CLINIC | Age: 73
End: 2023-06-08
Payer: COMMERCIAL

## 2023-06-08 VITALS
BODY MASS INDEX: 24.92 KG/M2 | HEIGHT: 71 IN | DIASTOLIC BLOOD PRESSURE: 79 MMHG | HEART RATE: 71 BPM | WEIGHT: 178 LBS | SYSTOLIC BLOOD PRESSURE: 129 MMHG

## 2023-06-08 DIAGNOSIS — M12.811 ROTATOR CUFF TEAR ARTHROPATHY, RIGHT: ICD-10-CM

## 2023-06-08 DIAGNOSIS — M75.101 ROTATOR CUFF TEAR ARTHROPATHY, RIGHT: ICD-10-CM

## 2023-06-08 PROCEDURE — 99203 OFFICE O/P NEW LOW 30 MIN: CPT | Performed by: ORTHOPAEDIC SURGERY

## 2023-06-08 NOTE — PROGRESS NOTES
Assessment/Plan:  1  Rotator cuff tear arthropathy, right  Ambulatory Referral to Orthopedic Surgery    Ambulatory Referral to Physical Therapy    MRI shoulder right wo contrast        Scribe Attestation    I,:  Hope Monsivais am acting as a scribe while in the presence of the attending physician :       I,:  Kaden Huynh, DO personally performed the services described in this documentation    as scribed in my presence :             Safia and I reviewed her x-rays together  There is evidence of a high riding humeral head  I explained to the patient that this is likely due to an injury to the rotator cuff  This correlates well my physical examination  She has significant weakness with shoulder abduction  We discussed treatment options moving forward  I would like her to have an MRI of the right shoulder questioning rotator cuff tear arthropathy  Should a rotator cuff tear be found she may require operative intervention to restore nonpainful function of her shoulder  In addition to the MRI I would like her to begin with formal physical therapy  Once the MRI has been completed and a report has been provided by radiology she will return to see me and I will delineate a plan of care  Subjective:   Safia Moncada is a 81 y o  female who presents to the office today for evaluation of her right shoulder  Safia states that she has been experiencing shoulder pain for quite some time now  She does not recall a specific injury to her shoulder though admits to falling off the last rung of a ladder approximately 1 year ago  For the past several months she has been experiencing an intermittent sharp and stabbing pain about the lateral aspect of the right shoulder that will radiate anteriorly  This is exacerbated with overhead movements  She finds it quite difficult placing objects into her cupboards  She is also experiencing a pain anteriorly when reaching forward and performing twisting type motions    This causes a sharp pain as well  She has tried Voltaren cream to the shoulder with no relief  She denies distal paresthesias  Review of Systems   Constitutional: Negative for chills, fever and unexpected weight change  HENT: Negative for hearing loss, nosebleeds and sore throat  Eyes: Negative for pain, redness and visual disturbance  Respiratory: Negative for cough, shortness of breath and wheezing  Cardiovascular: Negative for chest pain, palpitations and leg swelling  Gastrointestinal: Negative for abdominal pain, nausea and vomiting  Endocrine: Negative for polydipsia and polyuria  Genitourinary: Negative for dysuria and hematuria  Musculoskeletal:        See HPI   Skin: Negative for rash and wound  Neurological: Negative for dizziness, numbness and headaches  Psychiatric/Behavioral: Negative for decreased concentration and suicidal ideas  The patient is not nervous/anxious            Past Medical History:   Diagnosis Date   • Abnormal inflammatory bowel disease panel     last assessed: 14   • Asthma    • Chest wall contusion     last assessed: 17   • Dermatomycosis 2011   • Hordeolum internum of left eye     last assessed: 13   • Hx of oral aphthous ulcers 2011   • Hypercalcemia 2011   • Hyperlipidemia    • Hypertension    • Sciatica 2008   • Ulceration of intestine     last assessed: 14       Past Surgical History:   Procedure Laterality Date   • MYRINGOTOMY Bilateral        Family History   Problem Relation Age of Onset   • No Known Problems Mother         macular degeneration per allscripts   • No Known Problems Father    • No Known Problems Maternal Aunt    • No Known Problems Paternal Aunt        Social History     Occupational History   • Not on file   Tobacco Use   • Smoking status: Former     Packs/day: 2 00     Years: 20 00     Total pack years: 40 00     Types: Cigarettes     Quit date: 2005     Years since quittin 9   • Smokeless tobacco: Never   Vaping Use   • Vaping Use: Never used   Substance and Sexual Activity   • Alcohol use: Yes     Comment: occasional    • Drug use: No   • Sexual activity: Not on file         Current Outpatient Medications:   •  albuterol (PROVENTIL HFA,VENTOLIN HFA) 90 mcg/act inhaler, Inhale 2 puffs every 4 (four) hours as needed for wheezing, Disp: , Rfl:   •  Breo Ellipta 100-25 MCG/ACT inhaler, USE 1 INHALATION BY MOUTH  ONCE DAILY AT THE SAME TIME EACH DAY   RINSE MOUTH  AFTER USE, Disp: 180 each, Rfl: 3  •  losartan (COZAAR) 50 mg tablet, TAKE 1 TABLET BY MOUTH  DAILY, Disp: 90 tablet, Rfl: 3  •  multivitamin (THERAGRAN) TABS, Take 1 tablet by mouth daily, Disp: , Rfl:   •  rosuvastatin (CRESTOR) 10 MG tablet, TAKE 1 TABLET BY MOUTH  DAILY AT BEDTIME, Disp: 90 tablet, Rfl: 3    Allergies   Allergen Reactions   • Molds & Smuts Shortness Of Breath     Mold and mildew       Objective:  Vitals:    06/08/23 1100   BP: 129/79   Pulse: 71       Right Shoulder Exam     Tenderness   The patient is experiencing tenderness in the biceps tendon  Range of Motion   Active abduction: 150 (painful)   External rotation: 80   Forward flexion: 150 (painful)   Internal rotation 0 degrees: L3     Muscle Strength   Abduction: 4/5   Internal rotation: 5/5   External rotation: 5/5   Supraspinatus: 4/5   Subscapularis: 5/5   Biceps: 4/5     Tests   Impingement: negative    Other   Sensation: normal (2+ radial)  Pulse: present (2+ radial)    Comments:  (+) Speed's            Physical Exam  Vitals reviewed  Constitutional:       Appearance: She is well-developed  HENT:      Head: Normocephalic and atraumatic  Eyes:      General:         Right eye: No discharge  Left eye: No discharge  Conjunctiva/sclera: Conjunctivae normal    Cardiovascular:      Rate and Rhythm: Regular rhythm  Pulmonary:      Effort: Pulmonary effort is normal  No respiratory distress  Breath sounds: No stridor     Musculoskeletal: Cervical back: Normal range of motion and neck supple  Comments: As noted in the HPI   Skin:     General: Skin is warm and dry  Neurological:      Mental Status: She is alert and oriented to person, place, and time  Psychiatric:         Behavior: Behavior normal          I have personally reviewed pertinent films in PACS and my interpretation is as follows:  Xrays of the right shoulder ordered by Dr Percy Walden and performed on 5/17/23 were reviewed  There is evidence of a high riding humeral head  There is no evidence of acute fracture or other osseous abnormality  This document was created using speech voice recognition software  Grammatical errors, random word insertions, pronoun errors, and incomplete sentences are an occasional consequence of this system due to software limitations, ambient noise, and hardware issues  Any formal questions or concerns about content, text, or information contained within the body of this dictation should be directly addressed to the provider for clarification

## 2023-06-14 ENCOUNTER — EVALUATION (OUTPATIENT)
Dept: PHYSICAL THERAPY | Facility: CLINIC | Age: 73
End: 2023-06-14
Payer: COMMERCIAL

## 2023-06-14 DIAGNOSIS — M12.811 ROTATOR CUFF TEAR ARTHROPATHY, RIGHT: ICD-10-CM

## 2023-06-14 DIAGNOSIS — M25.511 CHRONIC RIGHT SHOULDER PAIN: Primary | ICD-10-CM

## 2023-06-14 DIAGNOSIS — G89.29 CHRONIC RIGHT SHOULDER PAIN: Primary | ICD-10-CM

## 2023-06-14 DIAGNOSIS — M75.101 ROTATOR CUFF TEAR ARTHROPATHY, RIGHT: ICD-10-CM

## 2023-06-14 PROCEDURE — 97161 PT EVAL LOW COMPLEX 20 MIN: CPT | Performed by: PHYSICAL MEDICINE & REHABILITATION

## 2023-06-14 NOTE — PROGRESS NOTES
PT Evaluation     Today's date: 2023  Patient name: Umesh Willett  : 1950  MRN: [id number]  Referring provider: Rich Sanchze DO  Dx:   Encounter Diagnosis     ICD-10-CM    1  Chronic right shoulder pain  M25 511     G89 29       2  Rotator cuff tear arthropathy, right  M75 101 Ambulatory Referral to Physical Therapy    M12 811                      Assessment  Assessment details: Umesh Willett is an 81 y o  female  arriving to clinic with complaints of right shoulder pain  Patient presents with limitations of right shoulder active range of motion due to pain, limitations in strengths in all planes, and (+) special tests indicating RTC involvement  Patient also presents with impaired thoracic mobility into extension, and poor posture  Due to current deficits, patient is limited functionally with ADL's, recreational activities, lifting/carrying, reaching  Patient has been educated in home exercise program and plan of care  Patient would benefit from skilled physical therapy services to address their aforementioned functional limitations and progress towards prior level of function and independence with home exercise program     Impairments: abnormal or restricted ROM, activity intolerance, impaired physical strength, lacks appropriate home exercise program, pain with function, scapular dyskinesis and poor posture     Symptom irritability: high  Goals  Short term goals: 4 weeks  1  Initiate comprehensive HEP to move towards symptom reduction and return to function  2  Improve postural awareness and shoulder mechanics to improve overhead activity  3  Improve shoulder ROM to better tolerance with overhead activity and self care   4  Patient to reduce pain to 4/10 at its worst to improve activity tolerance    Long term goals: 8 weeks  1  Patient to exhibit independence with home exercise program  2  Patient to improve shoulder ROM to [facility] and with minimal pain  3   Improve gross shoulder strength to 4+/5 to allow to return to heavy household activity and self care  4  Patient to reduce pain to 1/10 at its worst to improve QOL and return to function      Plan  Patient would benefit from: skilled physical therapy  Planned modality interventions: TENS, unattended electrical stimulation, thermotherapy: hydrocollator packs and cryotherapy  Planned therapy interventions: abdominal trunk stabilization, flexibility, functional ROM exercises, home exercise program, therapeutic exercise, therapeutic activities, stretching, strengthening, self care, postural training, patient education, neuromuscular re-education, massage, manual therapy and joint mobilization  Frequency: 2x week  Duration in weeks: 12  Treatment plan discussed with: patient        Subjective Evaluation    History of Present Illness  Mechanism of injury: Patient reports a chronic history of right shoulder pain that has been going on for a couple of years  Patient notes that she began to notice weakness  Patient notes due to recent increase in pain she followed up an ortho who took x-rays that showed mild osteoarthritis and a high riding humeral head  Patient was referred to physical therapy and will be having an MRI in early July  Patient notes at this time she has a difficult time lifting, reaching overhead, reaching forward  Patient notes she feels both weak and in pain            Recurrent probem    Pain  Current pain ratin  At best pain ratin  At worst pain ratin  Location: R shld  Quality: sharp, discomfort and dull ache  Relieving factors: rest  Progression: worsening    Hand dominance: right      Diagnostic Tests  X-ray: abnormal (Mild OA of right shoulder, high riding humeral head)  Treatments  No previous or current treatments  Patient Goals  Patient goals for therapy: increased motion, increased strength and decreased pain          Objective  C-SPINE:  Cervical spine cleared with no symptom referral    * = pain    MMT:    Right  Left    Shoulder flexion:  3+/5*  4+/5  Shoulder abduction:  3+/5*  4+/5  Shoulder extension:  4+/5*  5/5  Shoulder IR neutral:  4/5*  5/5  Shoulder ER neutral:  3+/5*  5/5  Shoulder IR 90-90:  4+/5*  5/5   Shoulder ER 90-90:  3+/5*  5/5    AROM:    Right  Left    Shoulder flexion:  170*  170  Shoulder abduction:  170*  165  Shoulder extension:  70  70  Shoulder IR:    T12  T12  Shoulder ER:   T1*  T3       PALPATION:  TTP to RTC insertion    FLEXIBILITY:  No flexibility deficits noted    SPECIAL TESTS:    Impingement cluster: (3/5 or 1-3)  7-Saqisfn-Ygdmkme: (+) R  2-External rotation resistance: (+) R  3-Painful arc: (+) R  4-Empty can: (+) R  5-Neer: (+) R    RTC full thickness tear cluster: (3/3)  Painful arc: (+) R  Drop arm: (-)  External rotation resistance: (+) R    Others:  Sulcus: (-)  O'Brians active compression: (-)  Jobes relocation test: (-)  Apprehension test: (-)           Precautions:   Past Medical History:   Diagnosis Date   • Abnormal inflammatory bowel disease panel     last assessed: 09/03/14   • Asthma    • Chest wall contusion     last assessed: 04/21/17   • Dermatomycosis 08/02/2011   • Hordeolum internum of left eye     last assessed: 06/20/13   • Hx of oral aphthous ulcers 08/22/2011   • Hypercalcemia 01/11/2011   • Hyperlipidemia    • Hypertension    • Sciatica 05/02/2008   • Ulceration of intestine     last assessed: 9/03/14         Date: 06/14/2023       Visit # 1 Eval       Manuals        PROM R shld                                Neuro Re-Ed        Scap retractions        Shoulder ER seated        Banded rows        Banded shld extension        Scaption                        Ther Ex        FIS        Thoracic extension in chair        Wall slides flexion        AAROM shoulder flexion in supine        Prone shoulder extension                                                Ther Activity                        Gait Training                        Modalities Access Code: Massimoia Gabe  URL: [email or web address]  Date: 06/14/2023  Prepared by: John Hand    Exercises  - Seated Scapular Retraction  - 1 x daily - 7 x weekly - 2 sets - 10 reps  - Shoulder External Rotation and Scapular Retraction  - 1 x daily - 7 x weekly - 2 sets - 10 reps  - Sidelying Shoulder External Rotation  - 1 x daily - 7 x weekly - 2 sets - 10 reps  - Shoulder Flexion Wall Slide with Towel  - 1 x daily - 7 x weekly - 1 sets - 10 reps

## 2023-06-19 ENCOUNTER — OFFICE VISIT (OUTPATIENT)
Dept: PHYSICAL THERAPY | Facility: CLINIC | Age: 73
End: 2023-06-19
Payer: COMMERCIAL

## 2023-06-19 DIAGNOSIS — M75.101 ROTATOR CUFF TEAR ARTHROPATHY, RIGHT: Primary | ICD-10-CM

## 2023-06-19 DIAGNOSIS — G89.29 CHRONIC RIGHT SHOULDER PAIN: ICD-10-CM

## 2023-06-19 DIAGNOSIS — M25.511 CHRONIC RIGHT SHOULDER PAIN: ICD-10-CM

## 2023-06-19 DIAGNOSIS — M12.811 ROTATOR CUFF TEAR ARTHROPATHY, RIGHT: Primary | ICD-10-CM

## 2023-06-19 PROCEDURE — 97110 THERAPEUTIC EXERCISES: CPT

## 2023-06-19 PROCEDURE — 97140 MANUAL THERAPY 1/> REGIONS: CPT

## 2023-06-19 NOTE — PROGRESS NOTES
Daily Note     Today's date: 2023  Patient name: Curly Kehr  : 1950  MRN: [id number]  Referring provider: Aditya Guillermo DO  Dx:   Encounter Diagnosis     ICD-10-CM    1  Rotator cuff tear arthropathy, right  M75 101     M12 811       2  Chronic right shoulder pain  M25 511     G89 29                      Subjective: Patient reports compliance with HEP  I walk about 2 miles a day  Objective: See treatment diary below      Assessment: Tolerated treatment fair  Patient demonstrated fatigue post treatment, exhibited good technique with therapeutic exercises and would benefit from continued PT    HEP reviewed with patient  Plan: Progress treatment as tolerated         Precautions:   Past Medical History:   Diagnosis Date   • Abnormal inflammatory bowel disease panel     last assessed: 14   • Asthma    • Chest wall contusion     last assessed: 17   • Dermatomycosis 2011   • Hordeolum internum of left eye     last assessed: 13   • Hx of oral aphthous ulcers 2011   • Hypercalcemia 2011   • Hyperlipidemia    • Hypertension    • Sciatica 2008   • Ulceration of intestine     last assessed: 14         Date: 2023      Visit # 1 Eval 2      Manuals        PROM R shld  AD                              Neuro Re-Ed        Scap retractions  10x 5 sec hold (supine)      Shoulder ER seated  10x 5 sec hold       Banded rows  RTB x 20 reps       Banded shld extension  RTB x 20 reps       Scaption  ---                      Ther Ex        FIS  10x 5 sec hold blue PB      Thoracic extension in chair  10x 5 sec hold (arms crossed)      Wall slides flexion  10x 5 sec hold (pillow case)      AAROM shoulder flexion in supine  5x      Prone shoulder extension  1# 2 x 10 reps                                               Ther Activity                        Gait Training                        Modalities                          Access Code: Fracisco Mahoney  URL: [email or web address]  Date: 06/14/2023  Prepared by: Samantha Chakraborty    Exercises  - Seated Scapular Retraction  - 1 x daily - 7 x weekly - 2 sets - 10 reps  - Shoulder External Rotation and Scapular Retraction  - 1 x daily - 7 x weekly - 2 sets - 10 reps  - Sidelying Shoulder External Rotation  - 1 x daily - 7 x weekly - 2 sets - 10 reps  - Shoulder Flexion Wall Slide with Towel  - 1 x daily - 7 x weekly - 1 sets - 10 reps

## 2023-06-22 ENCOUNTER — OFFICE VISIT (OUTPATIENT)
Dept: PHYSICAL THERAPY | Facility: CLINIC | Age: 73
End: 2023-06-22
Payer: COMMERCIAL

## 2023-06-22 DIAGNOSIS — G89.29 CHRONIC RIGHT SHOULDER PAIN: ICD-10-CM

## 2023-06-22 DIAGNOSIS — M12.811 ROTATOR CUFF TEAR ARTHROPATHY, RIGHT: Primary | ICD-10-CM

## 2023-06-22 DIAGNOSIS — M25.511 CHRONIC RIGHT SHOULDER PAIN: ICD-10-CM

## 2023-06-22 DIAGNOSIS — M75.101 ROTATOR CUFF TEAR ARTHROPATHY, RIGHT: Primary | ICD-10-CM

## 2023-06-22 PROCEDURE — 97140 MANUAL THERAPY 1/> REGIONS: CPT

## 2023-06-22 PROCEDURE — 97110 THERAPEUTIC EXERCISES: CPT

## 2023-06-22 PROCEDURE — 97112 NEUROMUSCULAR REEDUCATION: CPT

## 2023-06-22 NOTE — PROGRESS NOTES
"Daily Note     Today's date: 2023  Patient name: Janell Hutchison  : 1950  MRN: [id number]  Referring provider: Bel Sanchez DO  Dx:   Encounter Diagnosis     ICD-10-CM    1  Rotator cuff tear arthropathy, right  M75 101     M12 811       2  Chronic right shoulder pain  M25 511     G89 29           Start Time: 1445  Stop Time: 1525  Total time in clinic (min): 40 minutes    Subjective: Patient reporting significant improvement in pain since working with DIEGO Otto Jimy at prior session  Patient noting minimal pain on arrival today  Objective: See treatment diary below      Assessment: Tolerated treatment well and with pain in shoulder with ER and abd abolished with repeated cervical retractions  Patient demonstrated fatigue post treatment, exhibited good technique with therapeutic exercises and would benefit from continued PT      Plan: Continue per plan of care  Progress treatment as tolerated         Precautions:   Past Medical History:   Diagnosis Date   • Abnormal inflammatory bowel disease panel     last assessed: 14   • Asthma    • Chest wall contusion     last assessed: 17   • Dermatomycosis 2011   • Hordeolum internum of left eye     last assessed: 13   • Hx of oral aphthous ulcers 2011   • Hypercalcemia 2011   • Hyperlipidemia    • Hypertension    • Sciatica 2008   • Ulceration of intestine     last assessed: 14         Date: 2023     Visit # 1 Eval 2 3     Manuals   5'     PROM R shld  AD LS all planes                             Neuro Re-Ed   15'     Scap retractions  10x 5 sec hold (supine) 10x5\" hold supine     Shoulder ER seated  10x 5 sec hold  S/L AAROM 1x10, AROM seated 2x10     Banded rows  RTB x 20 reps  RTB 2x10 cues for form     Banded shld extension  RTB x 20 reps  RTB 2x10 cues for form     Scaption  ---      S/L shld abd   8x AAROM w/ PT assist, initially ok, then inc pain noted                                   " "  Ther Ex   18'     FIS  10x 5 sec hold blue PB 15x5\" blue BP     Thoracic extension in chair  10x 5 sec hold (arms crossed)      Wall slides flexion  10x 5 sec hold (pillow case)      AAROM shoulder flexion in supine  5x 15x flx AAROM, std shld flx 1x15 AROM no pain after cervical retraction     Prone shoulder extension  1# 2 x 10 reps       Cervical retraction   2x10 with dec pain, inc ROM noted following performance, 1x10 later in session     Shoulder abd in std   1x w/ pain, then 10x without pain after cervical retraction     Pt education   Mechanical correction, impact of nerve impingement on muscle strength and pain                     Ther Activity                        Gait Training                        Modalities                          Access Code: [email or web address]  URL: [email or web address]  Date: 06/14/2023  Prepared by: Lexy Roberson    Exercises  - Seated Scapular Retraction  - 1 x daily - 7 x weekly - 2 sets - 10 reps  - Shoulder External Rotation and Scapular Retraction  - 1 x daily - 7 x weekly - 2 sets - 10 reps  - Sidelying Shoulder External Rotation  - 1 x daily - 7 x weekly - 2 sets - 10 reps  - Shoulder Flexion Wall Slide with Towel  - 1 x daily - 7 x weekly - 1 sets - 10 reps           "

## 2023-06-26 ENCOUNTER — OFFICE VISIT (OUTPATIENT)
Dept: PHYSICAL THERAPY | Facility: CLINIC | Age: 73
End: 2023-06-26
Payer: COMMERCIAL

## 2023-06-26 DIAGNOSIS — M25.511 CHRONIC RIGHT SHOULDER PAIN: ICD-10-CM

## 2023-06-26 DIAGNOSIS — M12.811 ROTATOR CUFF TEAR ARTHROPATHY, RIGHT: Primary | ICD-10-CM

## 2023-06-26 DIAGNOSIS — M75.101 ROTATOR CUFF TEAR ARTHROPATHY, RIGHT: Primary | ICD-10-CM

## 2023-06-26 DIAGNOSIS — G89.29 CHRONIC RIGHT SHOULDER PAIN: ICD-10-CM

## 2023-06-26 PROCEDURE — 97110 THERAPEUTIC EXERCISES: CPT

## 2023-06-26 PROCEDURE — 97112 NEUROMUSCULAR REEDUCATION: CPT

## 2023-06-26 PROCEDURE — 97140 MANUAL THERAPY 1/> REGIONS: CPT

## 2023-06-26 NOTE — PROGRESS NOTES
"Daily Note     Today's date: 2023  Patient name: Torito Tripp  : 1950  MRN: [id number]  Referring provider: Bethany Courtney DO  Dx:   Encounter Diagnosis     ICD-10-CM    1  Rotator cuff tear arthropathy, right  M75 101     M12 811       2  Chronic right shoulder pain  M25 511     G89 29              Stop Time: 1303       Subjective: Patient reports continued improvement with decreased R shoulder pain & increased motion  Objective: See treatment diary below      Assessment: Tolerated treatment fair  Patient demonstrated fatigue post treatment, exhibited good technique with therapeutic exercises and would benefit from continued PT      Plan: Progress treatment as tolerated         Precautions:   Past Medical History:   Diagnosis Date   • Abnormal inflammatory bowel disease panel     last assessed: 14   • Asthma    • Chest wall contusion     last assessed: 17   • Dermatomycosis 2011   • Hordeolum internum of left eye     last assessed: 13   • Hx of oral aphthous ulcers 2011   • Hypercalcemia 2011   • Hyperlipidemia    • Hypertension    • Sciatica 2008   • Ulceration of intestine     last assessed: 14         Date: 2023    Visit # 1 Eval 2 3 4    Manuals   5'     PROM R shld  AD LS all planes AD                            Neuro Re-Ed   15'     Scap retractions  10x 5 sec hold (supine) 10x5\" hold supine 10x5\" hold supine    Shoulder ER seated  10x 5 sec hold  S/L AAROM 1x10, AROM seated 2x10 10x 5 sec hold     Banded rows  RTB x 20 reps  RTB 2x10 cues for form RTB x 20 reps     Banded shld extension  RTB x 20 reps  RTB 2x10 cues for form RTB x 20 reps     Scaption  ---  ---    S/L shld abd   8x AAROM w/ PT assist, initially ok, then inc pain noted 5x AAROM (painfree)         S/L R shoulder ER 2 x 10 reps (cues for form)                        Standing 2# cane 10 x each (IR/ext)    Ther Ex   18'     FIS  10x 5 sec hold blue PB 15x5\" " blue BP 10x 5 sec hold blue PB    Thoracic extension in chair  10x 5 sec hold (arms crossed)  10x 5 sec hold (arms crossed)    Wall slides flexion  10x 5 sec hold (pillow case)  (Pillow case) 10x flexion ; 10x abduction    AAROM shoulder flexion in supine  5x 15x flx AAROM, std shld flx 1x15 AROM no pain after cervical retraction ----    Prone shoulder extension  1# 2 x 10 reps   2# 2 x 10 reps    Cervical retraction   2x10 with dec pain, inc ROM noted following performance, 1x10 later in session Seated (beginning of session) 10 x 5 sec hold     Shoulder abd in std   1x w/ pain, then 10x without pain after cervical retraction --    Pt education   Mechanical correction, impact of nerve impingement on muscle strength and pain                     Ther Activity                        Gait Training                        Modalities                          Access Code: [email or web address]  URL: [email or web address]  Date: 06/14/2023  Prepared by: Serenity Jack    Exercises  - Seated Scapular Retraction  - 1 x daily - 7 x weekly - 2 sets - 10 reps  - Shoulder External Rotation and Scapular Retraction  - 1 x daily - 7 x weekly - 2 sets - 10 reps  - Sidelying Shoulder External Rotation  - 1 x daily - 7 x weekly - 2 sets - 10 reps  - Shoulder Flexion Wall Slide with Towel  - 1 x daily - 7 x weekly - 1 sets - 10 reps

## 2023-06-28 ENCOUNTER — OFFICE VISIT (OUTPATIENT)
Dept: PHYSICAL THERAPY | Facility: CLINIC | Age: 73
End: 2023-06-28
Payer: COMMERCIAL

## 2023-06-28 DIAGNOSIS — G89.29 CHRONIC RIGHT SHOULDER PAIN: ICD-10-CM

## 2023-06-28 DIAGNOSIS — M25.511 CHRONIC RIGHT SHOULDER PAIN: ICD-10-CM

## 2023-06-28 DIAGNOSIS — M12.811 ROTATOR CUFF TEAR ARTHROPATHY, RIGHT: Primary | ICD-10-CM

## 2023-06-28 DIAGNOSIS — M75.101 ROTATOR CUFF TEAR ARTHROPATHY, RIGHT: Primary | ICD-10-CM

## 2023-06-28 PROCEDURE — 97112 NEUROMUSCULAR REEDUCATION: CPT | Performed by: PHYSICAL MEDICINE & REHABILITATION

## 2023-06-28 PROCEDURE — 97110 THERAPEUTIC EXERCISES: CPT | Performed by: PHYSICAL MEDICINE & REHABILITATION

## 2023-06-28 NOTE — PROGRESS NOTES
"Daily Note     Today's date: 2023  Patient name: Yasmeen Oropeza  : 1950  MRN: [id number]  Referring provider: Cecy Pike DO  Dx:   Encounter Diagnosis     ICD-10-CM    1  Rotator cuff tear arthropathy, right  M75 101     M12 811       2  Chronic right shoulder pain  M25 511     G89 29                      Subjective: Patient reports continued improvements, noting that reaching overhead has become better however states reaching forward is better  Objective: See treatment diary below      Assessment: Tolerated treatment fair  Patient demonstrated fatigue post treatment, exhibited good technique with therapeutic exercises and would benefit from continued PT      Plan: Progress treatment as tolerated         Precautions:   Past Medical History:   Diagnosis Date   • Abnormal inflammatory bowel disease panel     last assessed: 14   • Asthma    • Chest wall contusion     last assessed: 17   • Dermatomycosis 2011   • Hordeolum internum of left eye     last assessed: 13   • Hx of oral aphthous ulcers 2011   • Hypercalcemia 2011   • Hyperlipidemia    • Hypertension    • Sciatica 2008   • Ulceration of intestine     last assessed: 14         Date: 2023   Visit # 1 Eval 2 3 4  5 FOTO   Manuals   5'     PROM R shld  AD LS all planes AD                            Neuro Re-Ed   15'     Scap retractions  10x 5 sec hold (supine) 10x5\" hold supine 10x5\" hold supine 10x5\" hold supine   Shoulder ER seated  10x 5 sec hold  S/L AAROM 1x10, AROM seated 2x10 10x 5 sec hold  10x 5 sec hold    Banded rows  RTB x 20 reps  RTB 2x10 cues for form RTB x 20 reps  RTB x 20 reps    Banded shld extension  RTB x 20 reps  RTB 2x10 cues for form RTB x 20 reps  RTB x 20 reps    Scaption  ---  ---    S/L shld abd   8x AAROM w/ PT assist, initially ok, then inc pain noted 5x AAROM (painfree)  5x AAROM (painfree)        S/L R shoulder ER 2 x 10 reps (cues " "for form) S/L R shoulder ER 2 x 10 reps (cues for form)                   Standing cane IR/ext    Standing 2# cane 10 x each (IR/ext) #2 10x   Ther Ex   18'     FIS  10x 5 sec hold blue PB 15x5\" blue BP 10x 5 sec hold blue PB 10x 5 sec hold blue PB   Thoracic extension in chair  10x 5 sec hold (arms crossed)  10x 5 sec hold (arms crossed) 10x 5 sec hold (arms crossed)   Wall slides flexion  10x 5 sec hold (pillow case)  (Pillow case) 10x flexion ; 10x abduction (Pillow case) 10x flexion ; 10x abduction   AAROM shoulder flexion in supine  5x 15x flx AAROM, std shld flx 1x15 AROM no pain after cervical retraction ----    Prone shoulder extension  1# 2 x 10 reps   2# 2 x 10 reps 2# 2 x 10 reps   Cervical retraction   2x10 with dec pain, inc ROM noted following performance, 1x10 later in session Seated (beginning of session) 10 x 5 sec hold  Seated 2x10 beginning of session   Shoulder abd in std   1x w/ pain, then 10x without pain after cervical retraction --    Pt education   Mechanical correction, impact of nerve impingement on muscle strength and pain                     Ther Activity                        Gait Training                        Modalities                          Access Code: [email or web address]  URL: [email or web address]  Date: 06/14/2023  Prepared by: Yosef Kendall    Exercises  - Seated Scapular Retraction  - 1 x daily - 7 x weekly - 2 sets - 10 reps  - Shoulder External Rotation and Scapular Retraction  - 1 x daily - 7 x weekly - 2 sets - 10 reps  - Sidelying Shoulder External Rotation  - 1 x daily - 7 x weekly - 2 sets - 10 reps  - Shoulder Flexion Wall Slide with Towel  - 1 x daily - 7 x weekly - 1 sets - 10 reps             "

## 2023-07-03 ENCOUNTER — OFFICE VISIT (OUTPATIENT)
Dept: PHYSICAL THERAPY | Facility: CLINIC | Age: 73
End: 2023-07-03
Payer: COMMERCIAL

## 2023-07-03 DIAGNOSIS — M25.511 CHRONIC RIGHT SHOULDER PAIN: ICD-10-CM

## 2023-07-03 DIAGNOSIS — M12.811 ROTATOR CUFF TEAR ARTHROPATHY, RIGHT: Primary | ICD-10-CM

## 2023-07-03 DIAGNOSIS — G89.29 CHRONIC RIGHT SHOULDER PAIN: ICD-10-CM

## 2023-07-03 DIAGNOSIS — M75.101 ROTATOR CUFF TEAR ARTHROPATHY, RIGHT: Primary | ICD-10-CM

## 2023-07-03 PROCEDURE — 97110 THERAPEUTIC EXERCISES: CPT

## 2023-07-03 PROCEDURE — 97112 NEUROMUSCULAR REEDUCATION: CPT

## 2023-07-03 NOTE — PROGRESS NOTES
Daily Note     Today's date: 7/3/2023  Patient name: Trice Russo  : 1950  MRN: [id number]  Referring provider: Hazel Gunn DO  Dx:   Encounter Diagnosis     ICD-10-CM    1. Rotator cuff tear arthropathy, right  M75.101     M12.811       2. Chronic right shoulder pain  M25.511     G89.29           Start Time: 1015  Stop Time: 1100  Total time in clinic (min): 45 minutes    Subjective: Patient reports that she thinks she hurt her shoulder again while working outside on the few days it wasn't raining when starting the [employer]. Objective: See treatment diary below      Assessment: Tolerated treatment well and without increased pain. Noted to have gradually improving scapular control overall. patient progressing well overall. Patient demonstrated fatigue post treatment, exhibited good technique with therapeutic exercises and would benefit from continued PT      Plan: Continue per plan of care. Progress treatment as tolerated.        Precautions:   Past Medical History:   Diagnosis Date   • Abnormal inflammatory bowel disease panel     last assessed: 14   • Asthma    • Chest wall contusion     last assessed: 17   • Dermatomycosis 2011   • Hordeolum internum of left eye     last assessed: 13   • Hx of oral aphthous ulcers 2011   • Hypercalcemia 2011   • Hyperlipidemia    • Hypertension    • Sciatica 2008   • Ulceration of intestine     last assessed: 14         Date: 7/3/23    2023   Visit # 6     5 FOTO   Manuals 5'       PROM R shld        GH mobs LS post, inf R shld       IASTM  Ant shld-LS               Neuro Re-Ed 15'       Scap retractions 10x5" hold    10x5" hold supine   Shoulder ER seated 2x10     10x 5 sec hold    Banded rows RTB 2x10    RTB x 20 reps    Banded shld extension RTB 2x10    RTB x 20 reps    Scaption        S/L shld abd     5x AAROM (painfree)    S/L R Shld ER 2x10 w/ cues t/l for scapular stabilization    S/L R shoulder ER 2 x 10 reps (cues for form)                                   Standing cane IR/ext     #2 10x   Ther Ex 15'       FIS     10x 5 sec hold blue PB   Thoracic extension in chair     10x 5 sec hold (arms crossed)   Wall slides flexion 10x5" hold 10x 5" hold w/ pillow case    (Pillow case) 10x flexion ; 10x abduction   AAROM shoulder flexion in supine W/ cane 2x10; then 5xAROM       Prone shoulder extension     2# 2 x 10 reps   Cervical retraction 2x10 to start    Seated 2x10 beginning of session   Shoulder abd in std 2x10       Pt education Reviewed scapular positioning, avoiding excessive irritation of shoulder, and gradually increasing activity. Ther Activity                        Gait Training                        Modalities                          Access Code: Helen Blandon  URL: [email or web address]  Date: 06/14/2023  Prepared by: Sony Gregg    Exercises  - Seated Scapular Retraction  - 1 x daily - 7 x weekly - 2 sets - 10 reps  - Shoulder External Rotation and Scapular Retraction  - 1 x daily - 7 x weekly - 2 sets - 10 reps  - Sidelying Shoulder External Rotation  - 1 x daily - 7 x weekly - 2 sets - 10 reps  - Shoulder Flexion Wall Slide with Towel  - 1 x daily - 7 x weekly - 1 sets - 10 reps

## 2023-07-05 ENCOUNTER — HOSPITAL ENCOUNTER (OUTPATIENT)
Dept: RADIOLOGY | Facility: HOSPITAL | Age: 73
Discharge: HOME | End: 2023-07-05
Attending: ORTHOPAEDIC SURGERY
Payer: COMMERCIAL

## 2023-07-05 DIAGNOSIS — M75.101 ROTATOR CUFF TEAR ARTHROPATHY, RIGHT: ICD-10-CM

## 2023-07-05 DIAGNOSIS — M12.811 ROTATOR CUFF TEAR ARTHROPATHY, RIGHT: ICD-10-CM

## 2023-07-05 PROCEDURE — 73221 MRI JOINT UPR EXTREM W/O DYE: CPT

## 2023-07-05 PROCEDURE — G1004 CDSM NDSC: HCPCS

## 2023-07-06 ENCOUNTER — OFFICE VISIT (OUTPATIENT)
Dept: PHYSICAL THERAPY | Facility: CLINIC | Age: 73
End: 2023-07-06
Payer: COMMERCIAL

## 2023-07-06 ENCOUNTER — TELEPHONE (OUTPATIENT)
Dept: OBGYN CLINIC | Facility: HOSPITAL | Age: 73
End: 2023-07-06

## 2023-07-06 DIAGNOSIS — G89.29 CHRONIC RIGHT SHOULDER PAIN: ICD-10-CM

## 2023-07-06 DIAGNOSIS — M25.511 CHRONIC RIGHT SHOULDER PAIN: ICD-10-CM

## 2023-07-06 DIAGNOSIS — M12.811 ROTATOR CUFF TEAR ARTHROPATHY, RIGHT: Primary | ICD-10-CM

## 2023-07-06 DIAGNOSIS — M75.101 ROTATOR CUFF TEAR ARTHROPATHY, RIGHT: Primary | ICD-10-CM

## 2023-07-06 PROCEDURE — 97110 THERAPEUTIC EXERCISES: CPT | Performed by: PHYSICAL MEDICINE & REHABILITATION

## 2023-07-06 PROCEDURE — 97112 NEUROMUSCULAR REEDUCATION: CPT | Performed by: PHYSICAL MEDICINE & REHABILITATION

## 2023-07-06 NOTE — TELEPHONE ENCOUNTER
Caller: patient    Doctor: Patricia Golden    Reason for call: pt calling about her MRI results.   She is asking the results is it going to change her PT and she is also asking if someone can explain them to her    Call back#: [phone]

## 2023-07-06 NOTE — PROGRESS NOTES
Daily Note     Today's date: 2023  Patient name: Glenna Ren  : 1950  MRN: [id number]  Referring provider: Shaq Abbasi DO  Dx:   Encounter Diagnosis     ICD-10-CM    1. Rotator cuff tear arthropathy, right  M75.101     M12.811       2. Chronic right shoulder pain  M25.511     G89.29                      Subjective: Patient reports since recent flair up the soreness has eased. Patient notes she did undergo an MRI and is awaiting results. Patient notes overall improvements since initiating physical therapy however notes she is still experiencing weakness. Objective: See treatment diary below      Assessment: Tolerated treatment well. Patient responds well to manual intervention. Emphasized posture and thoracic mobility to prevent impingement in shoulder. Updated HEP. Patient demonstrated fatigue post treatment, exhibited good technique with therapeutic exercises and would benefit from continued PT      Plan: Continue per plan of care. Progress treatment as tolerated.        Precautions:   Past Medical History:   Diagnosis Date   • Abnormal inflammatory bowel disease panel     last assessed: 14   • Asthma    • Chest wall contusion     last assessed: 17   • Dermatomycosis 2011   • Hordeolum internum of left eye     last assessed: 13   • Hx of oral aphthous ulcers 2011   • Hypercalcemia 2011   • Hyperlipidemia    • Hypertension    • Sciatica 2008   • Ulceration of intestine     last assessed: 14         Date: 7/3/23 2023   2023   Visit # 6 7    5 FOTO   Manuals 5'       PROM R shld        GH mobs LS post, inf R shld Post, inf R shld      IASTM  Ant shld-LS STM to posterior capsule              Neuro Re-Ed 15'       Scap retractions 10x5" hold 10x5" hold   10x5" hold supine   Shoulder ER seated 2x10  2x10 no resistance  2x10 with YTB   10x 5 sec hold    Banded rows RTB 2x10 RTB 2x10   RTB x 20 reps    Banded shld extension RTB 2x10 RTB 2x10 RTB x 20 reps    Scaption        S/L shld abd     5x AAROM (painfree)    S/L R Shld ER 2x10 w/ cues t/l for scapular stabilization 2x10 w/ cues t/l for scapular stabilization   S/L R shoulder ER 2 x 10 reps (cues for form)   Standing shld resisted IR/ER  YTB 2x10 each                              Standing cane IR/ext     #2 10x   Ther Ex 15'       FIS     10x 5 sec hold blue PB   Thoracic extension in chair     10x 5 sec hold (arms crossed)   Wall slides flexion 10x5" hold 10x 5" hold w/ pillow case 10x5" hold 10x 5" hold w/ pillow case   (Pillow case) 10x flexion ; 10x abduction   AAROM shoulder flexion in supine W/ cane 2x10; then 5xAROM w/ cane 2x10; then 5xAROM      Prone shoulder extension     2# 2 x 10 reps   Cervical retraction 2x10 to start    Seated 2x10 beginning of session   Shoulder abd in std 2x10       Pt education Reviewed scapular positioning, avoiding excessive irritation of shoulder, and gradually increasing activity. HEP                      Ther Activity                        Gait Training                        Modalities                          Access Code: Brett John  URL: [email or web address]  Date: 06/14/2023  Prepared by: Kem Anton    Exercises  - Seated Scapular Retraction  - 1 x daily - 7 x weekly - 2 sets - 10 reps  - Shoulder External Rotation and Scapular Retraction  - 1 x daily - 7 x weekly - 2 sets - 10 reps  - Sidelying Shoulder External Rotation  - 1 x daily - 7 x weekly - 2 sets - 10 reps  - Shoulder Flexion Wall Slide with Towel  - 1 x daily - 7 x weekly - 1 sets - 10 reps

## 2023-07-06 NOTE — TELEPHONE ENCOUNTER
Safia had an MRI of her right shoulder which showed rotator cuff tears into of the 4 rotator cuff tendons. These were full-thickness tears and explain why her x-ray of her shoulder looked the way it did. They also saw small loose bodies within the shoulder which are likely from cartilage defects within the shoulder. She also had fluid around her biceps consistent with biceps tendinitis. Due to the tears in her shoulder, I would like for her to see Dr. La logan who is our shoulder surgeon and discuss treatment options. He will have the MRI Images available to review with her at the time of the visit.

## 2023-07-10 ENCOUNTER — EVALUATION (OUTPATIENT)
Dept: PHYSICAL THERAPY | Facility: CLINIC | Age: 73
End: 2023-07-10
Payer: COMMERCIAL

## 2023-07-10 DIAGNOSIS — M25.511 CHRONIC RIGHT SHOULDER PAIN: ICD-10-CM

## 2023-07-10 DIAGNOSIS — G89.29 CHRONIC RIGHT SHOULDER PAIN: ICD-10-CM

## 2023-07-10 DIAGNOSIS — M75.101 ROTATOR CUFF TEAR ARTHROPATHY, RIGHT: Primary | ICD-10-CM

## 2023-07-10 DIAGNOSIS — M12.811 ROTATOR CUFF TEAR ARTHROPATHY, RIGHT: Primary | ICD-10-CM

## 2023-07-10 PROCEDURE — 97110 THERAPEUTIC EXERCISES: CPT | Performed by: PHYSICAL MEDICINE & REHABILITATION

## 2023-07-10 PROCEDURE — 97112 NEUROMUSCULAR REEDUCATION: CPT | Performed by: PHYSICAL MEDICINE & REHABILITATION

## 2023-07-10 NOTE — PROGRESS NOTES
PT Re-Evaluation     Today's date: 7/10/2023  Patient name: Kalvin Romberg  : 1950  MRN: [id number]  Referring provider: Jack Plunkett DO  Dx:   Encounter Diagnosis     ICD-10-CM    1. Rotator cuff tear arthropathy, right  M75.101     M12.811       2. Chronic right shoulder pain  M25.511     G89.29                      Assessment  Assessment details: Initial evaluation: Kalvin Romberg is an 81 y.o. female  arriving to clinic with complaints of right shoulder pain. Patient presents with limitations of right shoulder active range of motion due to pain, limitations in strengths in all planes, and (+) special tests indicating RTC involvement. Patient also presents with impaired thoracic mobility into extension, and poor posture. Due to current deficits, patient is limited functionally with ADL's, recreational activities, lifting/carrying, reaching. Patient has been educated in home exercise program and plan of care. Patient would benefit from skilled physical therapy services to address their aforementioned functional limitations and progress towards prior level of function and independence with home exercise program.    Re-evaluation (07/10/2023): Patient is arriving to clinic for repeat evaluation of right shoulder following 8 formal physical therapy visits. Patient has been consistent with attendance to therapy, motivated during each treatment session, and has shown compliance with her home exercise program. Patient does continue to exhibit significant weakness in her right shoulder with elevation, ER, and impaired shoulder stability. (+) special tests indicate RTC involvement. Patient has progressed with strength and is exhibiting compensatory patterns to improve functional capacity. Patient will be following up with an orthopedic surgeon tomorrow 2023. Patient would benefit from continued skilled physical therapy at this time to progress strength and function.     Impairments: abnormal or restricted ROM, activity intolerance, impaired physical strength, lacks appropriate home exercise program, pain with function, scapular dyskinesis and poor posture     Symptom irritability: moderate  Goals  Short term goals: 4 weeks  1. Initiate comprehensive HEP to move towards symptom reduction and return to function  2. Improve postural awareness and shoulder mechanics to improve overhead activity  3. Improve shoulder ROM to better tolerance with overhead activity and self care   4. Patient to reduce pain to 4/10 at its worst to improve activity tolerance    Long term goals: 8 weeks  1. Patient to exhibit independence with home exercise program  2. Patient to improve shoulder ROM to [facility] and with minimal pain  3. Improve gross shoulder strength to 4+/5 to allow to return to heavy household activity and self care  4. Patient to reduce pain to 1/10 at its worst to improve QOL and return to function      Plan  Patient would benefit from: skilled physical therapy  Planned modality interventions: TENS, unattended electrical stimulation, thermotherapy: hydrocollator packs and cryotherapy  Planned therapy interventions: abdominal trunk stabilization, flexibility, functional ROM exercises, home exercise program, therapeutic exercise, therapeutic activities, stretching, strengthening, self care, postural training, patient education, neuromuscular re-education, massage, manual therapy and joint mobilization  Frequency: 2x week  Duration in weeks: 12  Treatment plan discussed with: patient        Subjective Evaluation    History of Present Illness  Mechanism of injury: Initial evaluation: Patient reports a chronic history of right shoulder pain that has been going on for a couple of years. Patient notes that she began to notice weakness. Patient notes due to recent increase in pain she followed up an ortho who took x-rays that showed mild osteoarthritis and a high riding humeral head.  Patient was referred to physical therapy and will be having an MRI in early July. Patient notes at this time she has a difficult time lifting, reaching overhead, reaching forward. Patient notes she feels both weak and in pain. Re-evaluation (07/10/2023): Patient reports since initiation of therapy she has noticed an improvement, noting that the pain in her shoulder is less severe, and less constant. Patient notes that she is continuing to have difficulties with overhead activities due to weakness. Patient states she will be following up with a surgeon tomorrow regarding her MRI results.           Recurrent probem    Pain  Current pain ratin  At best pain ratin  At worst pain rating: 3  Location: R shld  Quality: discomfort and dull ache  Relieving factors: rest  Progression: improved    Hand dominance: right      Diagnostic Tests  X-ray: abnormal (Mild OA of right shoulder, high riding humeral head)  Treatments  No previous or current treatments  Patient Goals  Patient goals for therapy: increased motion, increased strength and decreased pain          Objective  C-SPINE:  Cervical spine cleared with no symptom referral    * = pain    MMT:    Right  Left    Shoulder flexion:  4/5*  4+/5  Shoulder abduction:  4-/5*  4+/5  Shoulder extension:  5/5  5/5  Shoulder IR neutral:  4+/5  5/5  Shoulder ER neutral:  4-/5*  5/5  Shoulder IR 90-90:  4+/5*  5/5   Shoulder ER 90-90:  3+/5*  5/5    AROM:    Right  Left    Shoulder flexion:  170*  170  Shoulder abduction:  170*  165  Shoulder extension:  70  70  Shoulder IR:    T12  T12  Shoulder ER:   T1  T3       PALPATION:  TTP to RTC insertion    FLEXIBILITY:  No flexibility deficits noted    SPECIAL TESTS:    Impingement cluster: (3/5 or 1-3)  0-W[place]umsi-Orqnsgh: (+) R  2-External rotation resistance: (+) R  3-Painful arc: (+) R  4-Empty can: (+) R  5-Neer: (+) R    RTC full thickness tear cluster: (3/3)  Painful arc: (+) R  Drop arm: (-)  External rotation resistance: (+) R    Others:  Sulcus: (-)  O'Brians active compression: (-)  Jobes relocation test: (-)  Apprehension test: (-)           Precautions:   Past Medical History:   Diagnosis Date   • Abnormal inflammatory bowel disease panel     last assessed: 09/03/14   • Asthma    • Chest wall contusion     last assessed: 04/21/17   • Dermatomycosis 08/02/2011   • Hordeolum internum of left eye     last assessed: 06/20/13   • Hx of oral aphthous ulcers 08/22/2011   • Hypercalcemia 01/11/2011   • Hyperlipidemia    • Hypertension    • Sciatica 05/02/2008   • Ulceration of intestine     last assessed: 9/03/14         Date: 7/3/23 07/05/2023 07/10/2023  06/28/2023   Visit # 6 7 8 RE   5 FOTO   Manuals 5'       PROM R shld        GH mobs LS post, inf R shld Post, inf R shld      IASTM  Ant shld-LS STM to posterior capsule              Neuro Re-Ed 15'       Scap retractions 10x5" hold 10x5" hold   10x5" hold supine   Shoulder ER seated 2x10  2x10 no resistance  2x10 with YTB 2x10 no resistance  2x10 with YTB  10x 5 sec hold    Banded rows RTB 2x10 RTB 2x10 RTB 2x10  RTB x 20 reps    Banded shld extension RTB 2x10 RTB 2x10 RTB 2x10  RTB x 20 reps    Scaption        S/L shld abd     5x AAROM (painfree)    S/L R Shld ER 2x10 w/ cues t/l for scapular stabilization 2x10 w/ cues t/l for scapular stabilization 2x10 w/ cues t/l for scapular stabilization  S/L R shoulder ER 2 x 10 reps (cues for form)   Standing shld resisted IR/ER  YTB 2x10 each YTB 2x10 each                             Standing cane IR/ext     #2 10x   Ther Ex 15'       FIS     10x 5 sec hold blue PB   Thoracic extension in chair     10x 5 sec hold (arms crossed)   Wall slides flexion 10x5" hold 10x 5" hold w/ pillow case 10x5" hold 10x 5" hold w/ pillow case 10x5" hold 10x 5" hold w/ pillow case  (Pillow case) 10x flexion ; 10x abduction   AAROM shoulder flexion in supine W/ cane 2x10; then 5xAROM w/ cane 2x10; then 5xAROM w/ cane 2x10; then 5xAROM     Scaption   With ball 20x     Prone shoulder extension     2# 2 x 10 reps   Cervical retraction 2x10 to start    Seated 2x10 beginning of session   Shoulder abd in std 2x10       Pt education Reviewed scapular positioning, avoiding excessive irritation of shoulder, and gradually increasing activity. HEP                      Ther Activity                        Gait Training                        Modalities                        Access Code: Olvin García  URL: [email or web address]  Date: 06/14/2023  Prepared by: Sudha Living    Exercises  - Seated Scapular Retraction  - 1 x daily - 7 x weekly - 2 sets - 10 reps  - Shoulder External Rotation and Scapular Retraction  - 1 x daily - 7 x weekly - 2 sets - 10 reps  - Sidelying Shoulder External Rotation  - 1 x daily - 7 x weekly - 2 sets - 10 reps  - Shoulder Flexion Wall Slide with Towel  - 1 x daily - 7 x weekly - 1 sets - 10 reps

## 2023-07-11 ENCOUNTER — OFFICE VISIT (OUTPATIENT)
Dept: OBGYN CLINIC | Facility: CLINIC | Age: 73
End: 2023-07-11
Payer: COMMERCIAL

## 2023-07-11 VITALS
HEIGHT: 71 IN | HEART RATE: 79 BPM | WEIGHT: 178 LBS | BODY MASS INDEX: 24.92 KG/M2 | DIASTOLIC BLOOD PRESSURE: 80 MMHG | SYSTOLIC BLOOD PRESSURE: 121 MMHG

## 2023-07-11 DIAGNOSIS — M75.101 ROTATOR CUFF TEAR ARTHROPATHY, RIGHT: Primary | ICD-10-CM

## 2023-07-11 DIAGNOSIS — M12.811 ROTATOR CUFF TEAR ARTHROPATHY, RIGHT: Primary | ICD-10-CM

## 2023-07-11 PROCEDURE — 99213 OFFICE O/P EST LOW 20 MIN: CPT | Performed by: ORTHOPAEDIC SURGERY

## 2023-07-11 NOTE — PROGRESS NOTES
Orthopedic Sports Medicine New Patient Visit     Assesment:   81 y.o. female with right shoulder rotator cuff arthropathy    Plan:    The patient feels much better today than she did during previous visit with Dr. Vidhi Sheikh. She has had great results with physical therapy so far and she is getting continue doing this. I recommended against any surgical intervention at this time due to the fact that she feels so good overall and has returned to all of her normal activities of daily living. I did discuss that I do not believe that her rotator cuff tear would have been reliably good chance of healing with a repair and that she would likely require a reverse total shoulder arthroplasty if she got to the point of wanting surgery. However, based on her excellent function and minimal pain today I do not believe this is warranted at this time. She is good to continue physical therapy and return if her symptoms worsen again in the future to consider additional treatment options including injections, additional physical therapy, versus surgical intervention. Follow up:    Return if symptoms worsen or fail to improve. Chief Complaint   Patient presents with   • Right Shoulder - Pain       History of Present Illness: The patient is a 81 y.o., right hand dominant, female, presenting with atraumatic pain in the right shoulder. She was seen by Dr. Vidhi Sheikh and sent for an MRI who referred her to see me today. Pain was worse with overhead and reaching activities and was limiting her ability to perform activities daily living. She has started physical therapy recently and is feeling much better. She has minimal pain today and the shoulder is not stopping her from any specific activities at this time. She denies any numbness or tingling.     Affected shoulder SANE score: 80    Shoulder Surgical History:  None    Past Medical, Social and Family History:  Past Medical History:   Diagnosis Date   • Abnormal inflammatory bowel disease panel     last assessed: 14   • Asthma    • Chest wall contusion     last assessed: 17   • Dermatomycosis 2011   • Hordeolum internum of left eye     last assessed: 13   • Hx of oral aphthous ulcers 2011   • Hypercalcemia 2011   • Hyperlipidemia    • Hypertension    • Sciatica 2008   • Ulceration of intestine     last assessed: 14     Past Surgical History:   Procedure Laterality Date   • MYRINGOTOMY Bilateral      Allergies   Allergen Reactions   • Molds & Smuts Shortness Of Breath     Mold and mildew     Current Outpatient Medications on File Prior to Visit   Medication Sig Dispense Refill   • albuterol (PROVENTIL HFA,VENTOLIN HFA) 90 mcg/act inhaler Inhale 2 puffs every 4 (four) hours as needed for wheezing     • Breo Ellipta 100-25 MCG/ACT inhaler USE 1 INHALATION BY MOUTH  ONCE DAILY AT THE SAME TIME EACH DAY . RINSE MOUTH  AFTER  each 3   • losartan (COZAAR) 50 mg tablet TAKE 1 TABLET BY MOUTH  DAILY 90 tablet 3   • multivitamin (THERAGRAN) TABS Take 1 tablet by mouth daily     • rosuvastatin (CRESTOR) 10 MG tablet TAKE 1 TABLET BY MOUTH  DAILY AT BEDTIME 90 tablet 3     No current facility-administered medications on file prior to visit. Social History     Socioeconomic History   • Marital status:       Spouse name: Not on file   • Number of children: Not on file   • Years of education: Not on file   • Highest education level: Not on file   Occupational History   • Not on file   Tobacco Use   • Smoking status: Former     Packs/day: 2.00     Years: 20.00     Total pack years: 40.00     Types: Cigarettes     Quit date: 2005     Years since quittin.0   • Smokeless tobacco: Never   Vaping Use   • Vaping Use: Never used   Substance and Sexual Activity   • Alcohol use: Yes     Comment: occasional    • Drug use: No   • Sexual activity: Not on file   Other Topics Concern   • Not on file   Social History Narrative   • Not on file     Social Determinants of Health     Financial Resource Strain: Low Risk  (10/20/2022)    Overall Financial Resource Strain (CARDIA)    • Difficulty of Paying Living Expenses: Not hard at all   Food Insecurity: Not on file   Transportation Needs: No Transportation Needs (10/20/2022)    PRAPARE - Transportation    • Lack of Transportation (Medical): No    • Lack of Transportation (Non-Medical): No   Physical Activity: Not on file   Stress: Not on file   Social Connections: Not on file   Intimate Partner Violence: Not on file   Housing Stability: Not on file         I have reviewed the past medical, surgical, social and family history, medications and allergies as documented in the EMR. Review of systems: ROS is negative other than that noted in the HPI. Constitutional: Negative for fatigue and fever. HENT: Negative for sore throat. Respiratory: Negative for shortness of breath. Cardiovascular: Negative for chest pain. Gastrointestinal: Negative for abdominal pain. Endocrine: Negative for cold intolerance and heat intolerance. Genitourinary: Negative for flank pain. Musculoskeletal: Negative for back pain. Skin: Negative for rash. Allergic/Immunologic: Negative for immunocompromised state. Neurological: Negative for dizziness. Psychiatric/Behavioral: Negative for agitation. Physical Exam:    Blood pressure 121/80, pulse 79, height 5' 11" (1.803 m), weight 80.7 kg (178 lb), not currently breastfeeding. General/Constitutional: NAD, well developed, well nourished  HENT: Normocephalic, atraumatic  CV: Intact distal pulses, regular rate  Resp: No respiratory distress or labored breathing  Abdomen: soft, nondistended, non tender   Lymphatic: No lymphadenopathy palpated  Neuro: Alert and Oriented x 3, no focal deficits  Psych: Normal mood, normal affect  Skin: Warm, dry, no rashes, no erythema      Shoulder Exam:      Inspection: No ecchymosis, edema, or deformity.  No visualized wounds or skin lesions   Palpation: Mild AC and bicipital groove tenderness  Active Motion:       FF: 160       ER: 85       IR: 60  Strength: 4+/5 empty can, 4+/5 ER,  5/5 IR   Sensory - SILT in the Radial / Ulnar / Median / Axillary nerve distributions  Motor - AIN / PIN / Radial / Ulnar / Median / Axillary motor nerves in tact  Palpable Radial pulse  Cap refill <2secs in all digits    + Rush  + Damaris's      Imaging    I reviewed and interpreted X-rays of the right shoulder which show no fractures. There is slight superior migration of the humeral head. Mild to moderate degenerative changes. I reviewed and interpreted MRI of the right shoulder which shows large retracted rotator cuff tear with moderate atrophy. Loose bodies likely related to degenerative joint disease. Bicipital tendinosis.

## 2023-07-13 ENCOUNTER — OFFICE VISIT (OUTPATIENT)
Dept: PHYSICAL THERAPY | Facility: CLINIC | Age: 73
End: 2023-07-13
Payer: COMMERCIAL

## 2023-07-13 DIAGNOSIS — M25.511 CHRONIC RIGHT SHOULDER PAIN: ICD-10-CM

## 2023-07-13 DIAGNOSIS — M75.101 ROTATOR CUFF TEAR ARTHROPATHY, RIGHT: Primary | ICD-10-CM

## 2023-07-13 DIAGNOSIS — M12.811 ROTATOR CUFF TEAR ARTHROPATHY, RIGHT: Primary | ICD-10-CM

## 2023-07-13 DIAGNOSIS — G89.29 CHRONIC RIGHT SHOULDER PAIN: ICD-10-CM

## 2023-07-13 PROCEDURE — 97112 NEUROMUSCULAR REEDUCATION: CPT

## 2023-07-13 PROCEDURE — 97110 THERAPEUTIC EXERCISES: CPT

## 2023-07-13 NOTE — PROGRESS NOTES
Daily Note     Today's date: 2023  Patient name: May Turner  : 1950  MRN: [id number]  Referring provider: Alex Figueroa DO  Dx:   Encounter Diagnosis     ICD-10-CM    1. Rotator cuff tear arthropathy, right  M75.101     M12.811       2. Chronic right shoulder pain  M25.511     G89.29                      Subjective: I saw the Dr & he's pleased with my progress. Objective: See treatment diary below      Assessment: Tolerated treatment fair. Patient demonstrated fatigue post treatment, exhibited good technique with therapeutic exercises and would benefit from continued PT      Plan: Progress treatment as tolerated.        Precautions:   Past Medical History:   Diagnosis Date   • Abnormal inflammatory bowel disease panel     last assessed: 14   • Asthma    • Chest wall contusion     last assessed: 17   • Dermatomycosis 2011   • Hordeolum internum of left eye     last assessed: 13   • Hx of oral aphthous ulcers 2011   • Hypercalcemia 2011   • Hyperlipidemia    • Hypertension    • Sciatica 2008   • Ulceration of intestine     last assessed: 14         Date: 7/3/23 2023 07/10/2023 7/13    Visit # 6 7 8 RE 9    Manuals 5'       PROM R shld    ----    GH mobs LS post, inf R shld Post, inf R shld  Post, inf R shld    IASTM  Ant shld-LS STM to posterior capsule  STM to posterior capsule            Neuro Re-Ed 15'       Scap retractions 10x5" hold 10x5" hold  --- 10x5" hold supine   Shoulder ER seated 2x10  2x10 no resistance  2x10 with YTB 2x10 no resistance  2x10 with YTB YTB 2 x 10 reps  10x 5 sec hold    Banded rows RTB 2x10 RTB 2x10 RTB 2x10 2x10 reps RTB RTB x 20 reps    Banded shld extension RTB 2x10 RTB 2x10 RTB 2x10 2x10 reps RTB RTB x 20 reps    Scaption        S/L shld abd     5x AAROM (painfree)    S/L R Shld ER 2x10 w/ cues t/l for scapular stabilization 2x10 w/ cues t/l for scapular stabilization 2x10 w/ cues t/l for scapular stabilization 2x10 w/ cues t/l for scapular stabilization S/L R shoulder ER 2 x 10 reps (cues for form)   Standing shld resisted IR/ER  YTB 2x10 each YTB 2x10 each 2x10 reps YTB                            Standing cane IR/ext     #2 10x   Ther Ex 15'       FIS     10x 5 sec hold blue PB   Thoracic extension in chair     10x 5 sec hold (arms crossed)   Wall slides flexion 10x5" hold 10x 5" hold w/ pillow case 10x5" hold 10x 5" hold w/ pillow case 10x5" hold 10x 5" hold w/ pillow case 10x5" hold 10x 5" hold w/ pillow case (Pillow case) 10x flexion ; 10x abduction   AAROM shoulder flexion in supine W/ cane 2x10; then 5xAROM w/ cane 2x10; then 5xAROM w/ cane 2x10; then 5xAROM w/ cane 2x10; then 5xAROM    Scaption   With ball 20x 20x w/ ball    Prone shoulder extension     2# 2 x 10 reps   Cervical retraction 2x10 to start    Seated 2x10 beginning of session   Shoulder abd in std 2x10       Pt education Reviewed scapular positioning, avoiding excessive irritation of shoulder, and gradually increasing activity. HEP                      Ther Activity                        Gait Training                        Modalities                        Access Code: Blade Frazier  URL: [email or web address]  Date: 06/14/2023  Prepared by: Indy Osborn    Exercises  - Seated Scapular Retraction  - 1 x daily - 7 x weekly - 2 sets - 10 reps  - Shoulder External Rotation and Scapular Retraction  - 1 x daily - 7 x weekly - 2 sets - 10 reps  - Sidelying Shoulder External Rotation  - 1 x daily - 7 x weekly - 2 sets - 10 reps  - Shoulder Flexion Wall Slide with Towel  - 1 x daily - 7 x weekly - 1 sets - 10 reps

## 2023-07-18 ENCOUNTER — OFFICE VISIT (OUTPATIENT)
Dept: PHYSICAL THERAPY | Facility: CLINIC | Age: 73
End: 2023-07-18
Payer: COMMERCIAL

## 2023-07-18 DIAGNOSIS — M75.101 ROTATOR CUFF TEAR ARTHROPATHY, RIGHT: Primary | ICD-10-CM

## 2023-07-18 DIAGNOSIS — M12.811 ROTATOR CUFF TEAR ARTHROPATHY, RIGHT: Primary | ICD-10-CM

## 2023-07-18 DIAGNOSIS — M25.511 CHRONIC RIGHT SHOULDER PAIN: ICD-10-CM

## 2023-07-18 DIAGNOSIS — G89.29 CHRONIC RIGHT SHOULDER PAIN: ICD-10-CM

## 2023-07-18 PROCEDURE — 97110 THERAPEUTIC EXERCISES: CPT

## 2023-07-18 PROCEDURE — 97112 NEUROMUSCULAR REEDUCATION: CPT

## 2023-07-18 NOTE — PROGRESS NOTES
Daily Note     Today's date: 2023  Patient name: Ramón Ariza  : 1950  MRN: [id number]  Referring provider: Joe Díaz DO  Dx:   Encounter Diagnosis     ICD-10-CM    1. Rotator cuff tear arthropathy, right  M75.101     M12.811       2. Chronic right shoulder pain  M25.511     G89.29                      Subjective: I was able to start the  with my R arm. Objective: See treatment diary below      Assessment: Tolerated treatment well. Patient demonstrated fatigue post treatment, exhibited good technique with therapeutic exercises and would benefit from continued PT      Plan: Progress treatment as tolerated.        Precautions:   Past Medical History:   Diagnosis Date   • Abnormal inflammatory bowel disease panel     last assessed: 14   • Asthma    • Chest wall contusion     last assessed: 17   • Dermatomycosis 2011   • Hordeolum internum of left eye     last assessed: 13   • Hx of oral aphthous ulcers 2011   • Hypercalcemia 2011   • Hyperlipidemia    • Hypertension    • Sciatica 2008   • Ulceration of intestine     last assessed: 14         Date: 7/3/23 2023 07/10/2023 7/13 7/18   Visit # 6 7 8 RE 9 10   Manuals 5'       PROM R shld    ----    GH mobs LS post, inf R shld Post, inf R shld  Post, inf R shld Post, inf R shld   IASTM  Ant shld-LS STM to posterior capsule  STM to posterior capsule STM to posterior capsule           Neuro Re-Ed 15'       Scap retractions 10x5" hold 10x5" hold  --- ----   Shoulder ER seated 2x10  2x10 no resistance  2x10 with YTB 2x10 no resistance  2x10 with YTB YTB 2 x 10 reps  10x 5 sec hold YTB   Banded rows RTB 2x10 RTB 2x10 RTB 2x10 2x10 reps RTB RTB x 20 reps    Banded shld extension RTB 2x10 RTB 2x10 RTB 2x10 2x10 reps RTB RTB x 20 reps    Scaption     ---   S/L shld abd     ---    S/L R Shld ER 2x10 w/ cues t/l for scapular stabilization 2x10 w/ cues t/l for scapular stabilization 2x10 w/ cues t/l for scapular stabilization 2x10 w/ cues t/l for scapular stabilization S/L R shoulder ER 2 x 10 reps (cues for form)   Standing shld resisted IR/ER  YTB 2x10 each YTB 2x10 each 2x10 reps YTB 2x10 reps YTB                           Standing cane IR/ext     #2 10x   Ther Ex 15'       FIS     10x 5 sec hold blue PB   Thoracic extension in chair     10x 5 sec hold (arms crossed)   Wall slides flexion 10x5" hold 10x 5" hold w/ pillow case 10x5" hold 10x 5" hold w/ pillow case 10x5" hold 10x 5" hold w/ pillow case 10x5" hold 10x 5" hold w/ pillow case -----   AAROM shoulder flexion in supine W/ cane 2x10; then 5xAROM w/ cane 2x10; then 5xAROM w/ cane 2x10; then 5xAROM w/ cane 2x10; then 5xAROM ------   Scaption   With ball 20x 20x w/ ball 20x w/ ball   Prone shoulder extension     2# - 20x each - rows , ext   Cervical retraction 2x10 to start    ----   Shoulder abd in std 2x10    ----   Pt education Reviewed scapular positioning, avoiding excessive irritation of shoulder, and gradually increasing activity. HEP                      Ther Activity                        Gait Training                        Modalities                        Access Code: Michael Parikh  URL: [email or web address]  Date: 06/14/2023  Prepared by: Timoteo Sharma    Exercises  - Seated Scapular Retraction  - 1 x daily - 7 x weekly - 2 sets - 10 reps  - Shoulder External Rotation and Scapular Retraction  - 1 x daily - 7 x weekly - 2 sets - 10 reps  - Sidelying Shoulder External Rotation  - 1 x daily - 7 x weekly - 2 sets - 10 reps  - Shoulder Flexion Wall Slide with Towel  - 1 x daily - 7 x weekly - 1 sets - 10 reps

## 2023-07-20 ENCOUNTER — OFFICE VISIT (OUTPATIENT)
Dept: PHYSICAL THERAPY | Facility: CLINIC | Age: 73
End: 2023-07-20
Payer: COMMERCIAL

## 2023-07-20 DIAGNOSIS — G89.29 CHRONIC RIGHT SHOULDER PAIN: ICD-10-CM

## 2023-07-20 DIAGNOSIS — M25.511 CHRONIC RIGHT SHOULDER PAIN: ICD-10-CM

## 2023-07-20 DIAGNOSIS — M12.811 ROTATOR CUFF TEAR ARTHROPATHY, RIGHT: Primary | ICD-10-CM

## 2023-07-20 DIAGNOSIS — M75.101 ROTATOR CUFF TEAR ARTHROPATHY, RIGHT: Primary | ICD-10-CM

## 2023-07-20 PROCEDURE — 97112 NEUROMUSCULAR REEDUCATION: CPT

## 2023-07-20 PROCEDURE — 97110 THERAPEUTIC EXERCISES: CPT

## 2023-07-25 ENCOUNTER — OFFICE VISIT (OUTPATIENT)
Dept: PHYSICAL THERAPY | Facility: CLINIC | Age: 73
End: 2023-07-25
Payer: COMMERCIAL

## 2023-07-25 DIAGNOSIS — M12.811 ROTATOR CUFF TEAR ARTHROPATHY, RIGHT: Primary | ICD-10-CM

## 2023-07-25 DIAGNOSIS — M25.511 CHRONIC RIGHT SHOULDER PAIN: ICD-10-CM

## 2023-07-25 DIAGNOSIS — G89.29 CHRONIC RIGHT SHOULDER PAIN: ICD-10-CM

## 2023-07-25 DIAGNOSIS — M75.101 ROTATOR CUFF TEAR ARTHROPATHY, RIGHT: Primary | ICD-10-CM

## 2023-07-25 PROCEDURE — 97110 THERAPEUTIC EXERCISES: CPT

## 2023-07-25 PROCEDURE — 97112 NEUROMUSCULAR REEDUCATION: CPT

## 2023-07-27 ENCOUNTER — OFFICE VISIT (OUTPATIENT)
Dept: PHYSICAL THERAPY | Facility: CLINIC | Age: 73
End: 2023-07-27
Payer: COMMERCIAL

## 2023-07-27 DIAGNOSIS — M75.101 ROTATOR CUFF TEAR ARTHROPATHY, RIGHT: Primary | ICD-10-CM

## 2023-07-27 DIAGNOSIS — M25.511 CHRONIC RIGHT SHOULDER PAIN: ICD-10-CM

## 2023-07-27 DIAGNOSIS — M12.811 ROTATOR CUFF TEAR ARTHROPATHY, RIGHT: Primary | ICD-10-CM

## 2023-07-27 DIAGNOSIS — G89.29 CHRONIC RIGHT SHOULDER PAIN: ICD-10-CM

## 2023-07-27 PROCEDURE — 97110 THERAPEUTIC EXERCISES: CPT

## 2023-07-27 PROCEDURE — 97112 NEUROMUSCULAR REEDUCATION: CPT

## 2023-07-27 NOTE — PROGRESS NOTES
Daily Note     Today's date: 2023  Patient name: Cody Lawler  : 1950  MRN: [id number]  Referring provider: Renetta Redmond DO  Dx:   Encounter Diagnosis     ICD-10-CM    1. Rotator cuff tear arthropathy, right  M75.101     M12.811       2. Chronic right shoulder pain  M25.511     G89.29           Start Time: 1014          Subjective: Patient reports continued improvement. Objective: See treatment diary below      Assessment: Tolerated treatment well. Patient demonstrated fatigue post treatment and would benefit from continued PT      Plan: Progress treatment as tolerated.        Precautions:   Past Medical History:   Diagnosis Date   • Abnormal inflammatory bowel disease panel     last assessed: 14   • Asthma    • Chest wall contusion     last assessed: 17   • Dermatomycosis 2011   • Hordeolum internum of left eye     last assessed: 13   • Hx of oral aphthous ulcers 2011   • Hypercalcemia 2011   • Hyperlipidemia    • Hypertension    • Sciatica 2008   • Ulceration of intestine     last assessed: 14         Date: 7/3/23 2023 07/10/2023 7/13 7/27   Visit # 6 7 8 RE 9 13   Manuals 5'       PROM R shld    ----    GH mobs LS post, inf R shld Post, inf R shld  Post, inf R shld Post, inf R shld   IASTM  Ant shld-LS STM to posterior capsule  STM to posterior capsule STM to posterior capsule           Neuro Re-Ed 15'       Scap retractions 10x5" hold 10x5" hold  --- ----   Shoulder ER seated 2x10  2x10 no resistance  2x10 with YTB 2x10 no resistance  2x10 with YTB YTB 2 x 10 reps  10x 5 sec hold YTB   Banded rows RTB 2x10 RTB 2x10 RTB 2x10 2x10 reps RTB RTB x 20 reps    Banded shld extension RTB 2x10 RTB 2x10 RTB 2x10 2x10 reps RTB RTB x 20 reps    Scaption     ---   S/L shld abd     ---    S/L R Shld ER 2x10 w/ cues t/l for scapular stabilization 2x10 w/ cues t/l for scapular stabilization 2x10 w/ cues t/l for scapular stabilization 2x10 w/ cues t/l for scapular stabilization S/L R shoulder ER 2 x 10 reps (cues for form)   Standing shld resisted IR/ER  YTB 2x10 each YTB 2x10 each 2x10 reps YTB 2x10 reps YTB        Wall walks YTB 3 x 10 reps  R/L        Supine serratus 1# 2 x 10 reps            Standing cane IR/ext     #2 10x   Ther Ex 15'       FIS     Pulleys x 20 reps    Thoracic extension in chair     10x 5 sec hold (arms crossed)   Wall slides flexion 10x5" hold 10x 5" hold w/ pillow case 10x5" hold 10x 5" hold w/ pillow case 10x5" hold 10x 5" hold w/ pillow case 10x5" hold 10x 5" hold w/ pillow case -----   AAROM shoulder flexion in supine W/ cane 2x10; then 5xAROM w/ cane 2x10; then 5xAROM w/ cane 2x10; then 5xAROM w/ cane 2x10; then 5xAROM ------   Scaption   With ball 20x 20x w/ ball ----   Prone shoulder extension     3# - 20x each - rows , ext   Cervical retraction 2x10 to start    ----   Shoulder abd in std 2x10    ----   Pt education Reviewed scapular positioning, avoiding excessive irritation of shoulder, and gradually increasing activity. HEP                      Ther Activity                        Gait Training                        Modalities                        Access Code: Bradford Chin  URL: [email or web address]  Date: 06/14/2023  Prepared by: Charles Client    Exercises  - Seated Scapular Retraction  - 1 x daily - 7 x weekly - 2 sets - 10 reps  - Shoulder External Rotation and Scapular Retraction  - 1 x daily - 7 x weekly - 2 sets - 10 reps  - Sidelying Shoulder External Rotation  - 1 x daily - 7 x weekly - 2 sets - 10 reps  - Shoulder Flexion Wall Slide with Towel  - 1 x daily - 7 x weekly - 1 sets - 10 reps

## 2023-07-28 ENCOUNTER — HOSPITAL ENCOUNTER (OUTPATIENT)
Dept: RADIOLOGY | Facility: HOSPITAL | Age: 73
Discharge: HOME | End: 2023-07-28
Attending: FAMILY MEDICINE
Payer: COMMERCIAL

## 2023-07-28 VITALS — HEIGHT: 71 IN | BODY MASS INDEX: 24.92 KG/M2 | WEIGHT: 178 LBS

## 2023-07-28 DIAGNOSIS — Z12.31 ENCOUNTER FOR SCREENING MAMMOGRAM FOR MALIGNANT NEOPLASM OF BREAST: ICD-10-CM

## 2023-07-28 PROCEDURE — 77063 BREAST TOMOSYNTHESIS BI: CPT

## 2023-07-28 PROCEDURE — 77067 SCR MAMMO BI INCL CAD: CPT

## 2023-08-01 ENCOUNTER — OFFICE VISIT (OUTPATIENT)
Dept: PHYSICAL THERAPY | Facility: CLINIC | Age: 73
End: 2023-08-01
Payer: COMMERCIAL

## 2023-08-01 DIAGNOSIS — G89.29 CHRONIC RIGHT SHOULDER PAIN: ICD-10-CM

## 2023-08-01 DIAGNOSIS — M12.811 ROTATOR CUFF TEAR ARTHROPATHY, RIGHT: Primary | ICD-10-CM

## 2023-08-01 DIAGNOSIS — M75.101 ROTATOR CUFF TEAR ARTHROPATHY, RIGHT: Primary | ICD-10-CM

## 2023-08-01 DIAGNOSIS — M25.511 CHRONIC RIGHT SHOULDER PAIN: ICD-10-CM

## 2023-08-01 PROCEDURE — 97112 NEUROMUSCULAR REEDUCATION: CPT

## 2023-08-01 PROCEDURE — 97110 THERAPEUTIC EXERCISES: CPT

## 2023-08-01 NOTE — PROGRESS NOTES
Daily Note     Today's date: 2023  Patient name: Wes Paulino  : 1950  MRN: [id number]  Referring provider: Aditi Rapp DO  Dx:   Encounter Diagnosis     ICD-10-CM    1. Rotator cuff tear arthropathy, right  M75.101     M12.811       2. Chronic right shoulder pain  M25.511     G89.29           Start Time: 1015          Subjective: My R arm is stronger with less pain. Objective: See treatment diary below      Assessment: Tolerated treatment fair. Patient demonstrated fatigue post treatment, exhibited good technique with therapeutic exercises and would benefit from continued PT  Written HEP provided & reviewed with patient verbalizing understanding. Plan: Potential discharge next visit.      Precautions:   Past Medical History:   Diagnosis Date   • Abnormal inflammatory bowel disease panel     last assessed: 14   • Asthma    • Chest wall contusion     last assessed: 17   • Dermatomycosis 2011   • Hordeolum internum of left eye     last assessed: 13   • Hx of oral aphthous ulcers 2011   • Hypercalcemia 2011   • Hyperlipidemia    • Hypertension    • Sciatica 2008   • Ulceration of intestine     last assessed: 14         Date: 7/3/23 2023 07/10/2023 7/13 8/1   Visit # 6 7 8 RE 9 14   Manuals 5'       PROM R shld    ----    GH mobs LS post, inf R shld Post, inf R shld  Post, inf R shld Post, inf R shld   IASTM  Ant shld-LS STM to posterior capsule  STM to posterior capsule STM to posterior capsule           Neuro Re-Ed 15'       Scap retractions 10x5" hold 10x5" hold  --- ----   Shoulder ER seated 2x10  2x10 no resistance  2x10 with YTB 2x10 no resistance  2x10 with YTB YTB 2 x 10 reps  10x 5 sec hold YTB   Banded rows RTB 2x10 RTB 2x10 RTB 2x10 2x10 reps RTB RTB x 20 reps    Banded shld extension RTB 2x10 RTB 2x10 RTB 2x10 2x10 reps RTB RTB x 20 reps    Scaption     ---   S/L shld abd     ---    S/L R Shld ER 2x10 w/ cues t/l for scapular stabilization 2x10 w/ cues t/l for scapular stabilization 2x10 w/ cues t/l for scapular stabilization 2x10 w/ cues t/l for scapular stabilization S/L R shoulder ER 2 x 10 reps (cues for form)   Standing shld resisted IR/ER  YTB 2x10 each YTB 2x10 each 2x10 reps YTB 2x10 reps YTB        Wall walks YTB 3 x 10 reps  R/L        Supine serratus 1# 2 x 10 reps            Standing cane IR/ext     #2 10x   Ther Ex 15'       FIS     Pulleys x 20 reps    Thoracic extension in chair     10x 5 sec hold (arms crossed)   Wall slides flexion 10x5" hold 10x 5" hold w/ pillow case 10x5" hold 10x 5" hold w/ pillow case 10x5" hold 10x 5" hold w/ pillow case 10x5" hold 10x 5" hold w/ pillow case -----   AAROM shoulder flexion in supine W/ cane 2x10; then 5xAROM w/ cane 2x10; then 5xAROM w/ cane 2x10; then 5xAROM w/ cane 2x10; then 5xAROM ------   Scaption   With ball 20x 20x w/ ball ----   Prone shoulder extension     3# - 20x each - rows , ext   Cervical retraction 2x10 to start    ----   Shoulder abd in std 2x10    ----   Pt education Reviewed scapular positioning, avoiding excessive irritation of shoulder, and gradually increasing activity. HEP                      Ther Activity                        Gait Training                        Modalities                        Access Code: Helen Barber  URL: [email or web address]  Date: 06/14/2023  Prepared by: Sony Gregg    Exercises  - Seated Scapular Retraction  - 1 x daily - 7 x weekly - 2 sets - 10 reps  - Shoulder External Rotation and Scapular Retraction  - 1 x daily - 7 x weekly - 2 sets - 10 reps  - Sidelying Shoulder External Rotation  - 1 x daily - 7 x weekly - 2 sets - 10 reps  - Shoulder Flexion Wall Slide with Towel  - 1 x daily - 7 x weekly - 1 sets - 10 reps

## 2023-08-04 ENCOUNTER — OFFICE VISIT (OUTPATIENT)
Dept: PHYSICAL THERAPY | Facility: CLINIC | Age: 73
End: 2023-08-04
Payer: COMMERCIAL

## 2023-08-04 DIAGNOSIS — G89.29 CHRONIC RIGHT SHOULDER PAIN: ICD-10-CM

## 2023-08-04 DIAGNOSIS — M25.511 CHRONIC RIGHT SHOULDER PAIN: ICD-10-CM

## 2023-08-04 DIAGNOSIS — M75.101 ROTATOR CUFF TEAR ARTHROPATHY, RIGHT: Primary | ICD-10-CM

## 2023-08-04 DIAGNOSIS — M12.811 ROTATOR CUFF TEAR ARTHROPATHY, RIGHT: Primary | ICD-10-CM

## 2023-08-04 PROCEDURE — 97110 THERAPEUTIC EXERCISES: CPT

## 2023-08-04 PROCEDURE — 97112 NEUROMUSCULAR REEDUCATION: CPT

## 2023-08-04 NOTE — PROGRESS NOTES
Daily Note     Today's date: 2023  Patient name: Janna Skinner  : 1950  MRN: [id number]  Referring provider: Latoya Guillermo DO  Dx:   Encounter Diagnosis     ICD-10-CM    1. Rotator cuff tear arthropathy, right  M75.101     M12.811       2. Chronic right shoulder pain  M25.511     G89.29           Start Time: 0930          Subjective: Patient reports continued improvement in R shoulder & going to try to perform HEP daily in order to increase strength. Objective: See treatment diary below      Assessment: Tolerated treatment well.  Patient demonstrated fatigue post treatment and exhibited good technique with therapeutic exercises      Plan: D/C with HEP     Precautions:   Past Medical History:   Diagnosis Date   • Abnormal inflammatory bowel disease panel     last assessed: 14   • Asthma    • Chest wall contusion     last assessed: 17   • Dermatomycosis 2011   • Hordeolum internum of left eye     last assessed: 13   • Hx of oral aphthous ulcers 2011   • Hypercalcemia 2011   • Hyperlipidemia    • Hypertension    • Sciatica 2008   • Ulceration of intestine     last assessed: 14         Date: 7/3/23 2023 07/10/2023 7/13 8/4   Visit # 6 7 8 RE 9 15   Manuals 5'       PROM R shld    ----    GH mobs LS post, inf R shld Post, inf R shld  Post, inf R shld Post, inf R shld   IASTM  Ant shld-LS STM to posterior capsule  STM to posterior capsule STM to posterior capsule           Neuro Re-Ed 15'       Scap retractions 10x5" hold 10x5" hold  --- ----   Shoulder ER seated 2x10  2x10 no resistance  2x10 with YTB 2x10 no resistance  2x10 with YTB YTB 2 x 10 reps  10x 5 sec hold YTB   Banded rows RTB 2x10 RTB 2x10 RTB 2x10 2x10 reps RTB RTB x 20 reps    Banded shld extension RTB 2x10 RTB 2x10 RTB 2x10 2x10 reps RTB RTB x 20 reps    Scaption     ---   S/L shld abd     ---    S/L R Shld ER 2x10 w/ cues t/l for scapular stabilization 2x10 w/ cues t/l for scapular stabilization 2x10 w/ cues t/l for scapular stabilization 2x10 w/ cues t/l for scapular stabilization S/L R shoulder ER 2 x 10 reps (cues for form)   Standing shld resisted IR/ER  YTB 2x10 each YTB 2x10 each 2x10 reps YTB 2x10 reps YTB        Wall walks YTB 3 x 10 reps  R/L        Supine serratus 1# 2 x 10 reps            Standing cane IR/ext     #2 10x   Ther Ex 15'       FIS     Pulleys x 20 reps    Thoracic extension in chair     10x 5 sec hold (arms crossed)   Wall slides flexion 10x5" hold 10x 5" hold w/ pillow case 10x5" hold 10x 5" hold w/ pillow case 10x5" hold 10x 5" hold w/ pillow case 10x5" hold 10x 5" hold w/ pillow case -----   AAROM shoulder flexion in supine W/ cane 2x10; then 5xAROM w/ cane 2x10; then 5xAROM w/ cane 2x10; then 5xAROM w/ cane 2x10; then 5xAROM ------   Scaption   With ball 20x 20x w/ ball ----   Prone shoulder extension     3# - 20x each - rows , ext   Cervical retraction 2x10 to start    ----   Shoulder abd in std 2x10    ----   Pt education Reviewed scapular positioning, avoiding excessive irritation of shoulder, and gradually increasing activity. HEP                      Ther Activity                        Gait Training                        Modalities                        Access Code: Francine Barcenas  URL: [email or web address]  Date: 06/14/2023  Prepared by: Marina Gonzalez    Exercises  - Seated Scapular Retraction  - 1 x daily - 7 x weekly - 2 sets - 10 reps  - Shoulder External Rotation and Scapular Retraction  - 1 x daily - 7 x weekly - 2 sets - 10 reps  - Sidelying Shoulder External Rotation  - 1 x daily - 7 x weekly - 2 sets - 10 reps  - Shoulder Flexion Wall Slide with Towel  - 1 x daily - 7 x weekly - 1 sets - 10 reps

## 2023-11-15 ENCOUNTER — OFFICE VISIT (OUTPATIENT)
Dept: FAMILY MEDICINE CLINIC | Facility: CLINIC | Age: 73
End: 2023-11-15
Payer: COMMERCIAL

## 2023-11-15 VITALS
RESPIRATION RATE: 14 BRPM | DIASTOLIC BLOOD PRESSURE: 70 MMHG | BODY MASS INDEX: 25.76 KG/M2 | HEART RATE: 84 BPM | OXYGEN SATURATION: 97 % | SYSTOLIC BLOOD PRESSURE: 110 MMHG | TEMPERATURE: 98 F | WEIGHT: 184 LBS | HEIGHT: 71 IN

## 2023-11-15 DIAGNOSIS — J45.40 MODERATE PERSISTENT ASTHMA WITHOUT COMPLICATION: ICD-10-CM

## 2023-11-15 DIAGNOSIS — I10 BENIGN ESSENTIAL HYPERTENSION: Primary | ICD-10-CM

## 2023-11-15 DIAGNOSIS — Z00.00 MEDICARE ANNUAL WELLNESS VISIT, SUBSEQUENT: ICD-10-CM

## 2023-11-15 DIAGNOSIS — E78.00 HYPERCHOLESTEROLEMIA: ICD-10-CM

## 2023-11-15 DIAGNOSIS — E21.3 HYPERPARATHYROIDISM (HCC): ICD-10-CM

## 2023-11-15 DIAGNOSIS — Z12.2 SCREENING FOR LUNG CANCER: ICD-10-CM

## 2023-11-15 DIAGNOSIS — Z78.0 POSTMENOPAUSAL: ICD-10-CM

## 2023-11-15 DIAGNOSIS — Z23 ENCOUNTER FOR IMMUNIZATION: ICD-10-CM

## 2023-11-15 PROCEDURE — 99214 OFFICE O/P EST MOD 30 MIN: CPT | Performed by: FAMILY MEDICINE

## 2023-11-15 PROCEDURE — G0439 PPPS, SUBSEQ VISIT: HCPCS | Performed by: FAMILY MEDICINE

## 2023-11-15 PROCEDURE — 90677 PCV20 VACCINE IM: CPT | Performed by: FAMILY MEDICINE

## 2023-11-15 PROCEDURE — G0009 ADMIN PNEUMOCOCCAL VACCINE: HCPCS | Performed by: FAMILY MEDICINE

## 2023-11-15 RX ORDER — SODIUM FLUORIDE AND POTASSIUM NITRATE 5.8; 57.5 MG/ML; MG/ML
GEL, DENTIFRICE DENTAL
COMMUNITY
Start: 2023-08-10

## 2023-11-15 NOTE — PATIENT INSTRUCTIONS
Medicare Preventive Visit Patient Instructions  Thank you for completing your Welcome to Medicare Visit or Medicare Annual Wellness Visit today. Your next wellness visit will be due in one year (11/15/2024). The screening/preventive services that you may require over the next 5-10 years are detailed below. Some tests may not apply to you based off risk factors and/or age. Screening tests ordered at today's visit but not completed yet may show as past due. Also, please note that scanned in results may not display below. Preventive Screenings:  Service Recommendations Previous Testing/Comments   Colorectal Cancer Screening  * Colonoscopy    * Fecal Occult Blood Test (FOBT)/Fecal Immunochemical Test (FIT)  * Fecal DNA/Cologuard Test  * Flexible Sigmoidoscopy Age: 43-73 years old   Colonoscopy: every 10 years (may be performed more frequently if at higher risk)  OR  FOBT/FIT: every 1 year  OR  Cologuard: every 3 years  OR  Sigmoidoscopy: every 5 years  Screening may be recommended earlier than age 39 if at higher risk for colorectal cancer. Also, an individualized decision between you and your healthcare provider will decide whether screening between the ages of 77-80 would be appropriate. Colonoscopy: 06/27/2014  FOBT/FIT: Not on file  Cologuard: Not on file  Sigmoidoscopy: Not on file          Breast Cancer Screening Age: 36 years old  Frequency: every 1-2 years  Not required if history of left and right mastectomy Mammogram: 07/28/2023        Cervical Cancer Screening Between the ages of 21-29, pap smear recommended once every 3 years. Between the ages of 32-69, can perform pap smear with HPV co-testing every 5 years.    Recommendations may differ for women with a history of total hysterectomy, cervical cancer, or abnormal pap smears in past. Pap Smear: Not on file        Hepatitis C Screening Once for adults born between [address] [place] [address]  More frequently in patients at high risk for Hepatitis C Hep C Antibody: Not on file        Diabetes Screening 1-2 times per year if you're at risk for diabetes or have pre-diabetes Fasting glucose: 102 mg/dL (10/20/2022)  A1C: No results in last 5 years (No results in last 5 years)      Cholesterol Screening Once every 5 years if you don't have a lipid disorder. May order more often based on risk factors. Lipid panel: 10/20/2022          Other Preventive Screenings Covered by Medicare:  Abdominal Aortic Aneurysm (AAA) Screening: covered once if your at risk. You're considered to be at risk if you have a family history of AAA. Lung Cancer Screening: covers low dose CT scan once per year if you meet all of the following conditions: (1) Age 48-67; (2) No signs or symptoms of lung cancer; (3) Current smoker or have quit smoking within the last 15 years; (4) You have a tobacco smoking history of at least 20 pack years (packs per day multiplied by number of years you smoked); (5) You get a written order from a healthcare provider. Glaucoma Screening: covered annually if you're considered high risk: (1) You have diabetes OR (2) Family history of glaucoma OR (3)  aged 48 and older OR (3)  American aged 72 and older  Osteoporosis Screening: covered every 2 years if you meet one of the following conditions: (1) You're estrogen deficient and at risk for osteoporosis based off medical history and other findings; (2) Have a vertebral abnormality; (3) On glucocorticoid therapy for more than 3 months; (4) Have primary hyperparathyroidism; (5) On osteoporosis medications and need to assess response to drug therapy. Last bone density test (DXA Scan): 06/28/2016. HIV Screening: covered annually if you're between the age of 14-79. Also covered annually if you are younger than 13 and older than 72 with risk factors for HIV infection. For pregnant patients, it is covered up to 3 times per pregnancy.     Immunizations:  Immunization Recommendations   Influenza Vaccine Annual influenza vaccination during flu season is recommended for all persons aged >= 6 months who do not have contraindications   Pneumococcal Vaccine   * Pneumococcal conjugate vaccine = PCV13 (Prevnar 13), PCV15 (Vaxneuvance), PCV20 (Prevnar 20)  * Pneumococcal polysaccharide vaccine = PPSV23 (Pneumovax) Adults 39-05 yo with certain risk factors or if 69+ yo  If never received any pneumonia vaccine: recommend Prevnar 20 (PCV20)  Give PCV20 if previously received 1 dose of PCV13 or PPSV23   Hepatitis B Vaccine 3 dose series if at intermediate or high risk (ex: diabetes, end stage renal disease, liver disease)   Respiratory syncytial virus (RSV) Vaccine - COVERED BY MEDICARE PART D  * RSVPreF3 (Arexvy) CDC recommends that adults 61years of age and older may receive a single dose of RSV vaccine using shared clinical decision-making (SCDM)   Tetanus (Td) Vaccine - COST NOT COVERED BY MEDICARE PART B Following completion of primary series, a booster dose should be given every 10 years to maintain immunity against tetanus. Td may also be given as tetanus wound prophylaxis. Tdap Vaccine - COST NOT COVERED BY MEDICARE PART B Recommended at least once for all adults. For pregnant patients, recommended with each pregnancy. Shingles Vaccine (Shingrix) - COST NOT COVERED BY MEDICARE PART B  2 shot series recommended in those 19 years and older who have or will have weakened immune systems or those 50 years and older     Health Maintenance Due:      Topic Date Due   • Hepatitis C Screening  Never done   • Colorectal Cancer Screening  06/27/2024   • Breast Cancer Screening: Mammogram  07/28/2024     Immunizations Due:      Topic Date Due   • Pneumococcal Vaccine: 65+ Years (1 - PCV) Never done   • COVID-19 Vaccine (6 - Moderna series) 12/21/2021   • Influenza Vaccine (1) 09/01/2023     Advance Directives   What are advance directives? Advance directives are legal documents that state your wishes and plans for medical care.  These plans are made ahead of time in case you lose your ability to make decisions for yourself. Advance directives can apply to any medical decision, such as the treatments you want, and if you want to donate organs. What are the types of advance directives? There are many types of advance directives, and each state has rules about how to use them. You may choose a combination of any of the following:  Living will: This is a written record of the treatment you want. You can also choose which treatments you do not want, which to limit, and which to stop at a certain time. This includes surgery, medicine, IV fluid, and tube feedings. Durable power of  for healthcare [facility]): This is a written record that states who you want to make healthcare choices for you when you are unable to make them for yourself. This person, called a proxy, is usually a family member or a friend. You may choose more than 1 proxy. Do not resuscitate (DNR) order:  A DNR order is used in case your heart stops beating or you stop breathing. It is a request not to have certain forms of treatment, such as CPR. A DNR order may be included in other types of advance directives. Medical directive: This covers the care that you want if you are in a coma, near death, or unable to make decisions for yourself. You can list the treatments you want for each condition. Treatment may include pain medicine, surgery, blood transfusions, dialysis, IV or tube feedings, and a ventilator (breathing machine). Values history: This document has questions about your views, beliefs, and how you feel and think about life. This information can help others choose the care that you would choose. Why are advance directives important? An advance directive helps you control your care. Although spoken wishes may be used, it is better to have your wishes written down. Spoken wishes can be misunderstood, or not followed.  Treatments may be given even if you do not want them. An advance directive may make it easier for your family to make difficult choices about your care. © Copyright [employer] 2018 Information is for End User's use only and may not be sold, redistributed or otherwise used for commercial purposes.  All illustrations and images included in CareNotes® are the copyrighted property of [employer] or [address] [place] [address]

## 2023-11-15 NOTE — PROGRESS NOTES
Assessment and Plan:     Problem List Items Addressed This Visit        Endocrine    Hyperparathyroidism ([address])    Relevant Orders    PTH, intact    Vitamin D 25 hydroxy       Respiratory    Asthma       Cardiovascular and Mediastinum    Benign essential hypertension - Primary    Relevant Orders    Lipid panel    TSH, 3rd generation    Comprehensive metabolic panel       Other    Hypercholesterolemia    Relevant Orders    Lipid panel   Other Visit Diagnoses     Postmenopausal        Relevant Orders    DXA bone density spine hip and pelvis    Medicare annual wellness visit, subsequent        Screening for lung cancer        Relevant Orders    CT lung screening program    Encounter for immunization        Relevant Orders    Pneumococcal Conjugate Vaccine 20-valent (Pcv20) (Completed)        BMI Counseling: Body mass index is 25.66 kg/m². The BMI is above normal. Exercise recommendations include exercising 3-5 times per week. Rationale for BMI follow-up plan is due to patient being overweight or obese. Depression Screening and Follow-up Plan: Patient was screened for depression during today's encounter. They screened negative with a PHQ-2 score of 0. Preventive health issues were discussed with patient, and age appropriate screening tests were ordered as noted in patient's After Visit Summary. Personalized health advice and appropriate referrals for health education or preventive services given if needed, as noted in patient's After Visit Summary. History of Present Illness:     Patient presents for a Medicare Wellness Visit    Pt is seeing for f/u HTN, HLD, Asthma, Hyperparathyroidism -  all stable  -  was seen by endo in the past -  since pt is asymptomatic,  monitoring was recommended -  baseline thyroid US in 2019        Patient Care Team:  Michael Abdalla MD as PCP - Conor Pike MD     Review of Systems:     Review of Systems   Constitutional: Negative. HENT: Negative. Except for R ear "underwater feeling"  -  will see ENT    Respiratory: Negative. Cardiovascular: Negative. Gastrointestinal: Negative. Genitourinary: Negative. Musculoskeletal: Negative. Skin: Negative. Neurological: Negative. Psychiatric/Behavioral: Negative. Problem List:     Patient Active Problem List   Diagnosis   • Asthma   • Benign essential hypertension   • Hypercholesterolemia   • Hyperparathyroidism ([address])   • Seasonal allergic rhinitis due to pollen      Past Medical and Surgical History:     Past Medical History:   Diagnosis Date   • Abnormal inflammatory bowel disease panel     last assessed: 14   • Asthma    • Chest wall contusion     last assessed: 17   • Dermatomycosis 2011   • Hordeolum internum of left eye     last assessed: 13   • Hx of oral aphthous ulcers 2011   • Hypercalcemia 2011   • Hyperlipidemia    • Hypertension    • Sciatica 2008   • Ulceration of intestine     last assessed: 14     Past Surgical History:   Procedure Laterality Date   • MYRINGOTOMY Bilateral       Family History:     Family History   Problem Relation Age of Onset   • No Known Problems Mother         macular degeneration per allscripts   • No Known Problems Father    • No Known Problems Maternal Aunt    • No Known Problems Paternal Aunt       Social History:     Social History     Socioeconomic History   • Marital status:       Spouse name: None   • Number of children: None   • Years of education: None   • Highest education level: None   Occupational History   • None   Tobacco Use   • Smoking status: Former     Packs/day: 2.00     Years: 20.00     Total pack years: 40.00     Types: Cigarettes     Quit date: 2005     Years since quittin.4   • Smokeless tobacco: Never   Vaping Use   • Vaping Use: Never used   Substance and Sexual Activity   • Alcohol use: Yes     Comment: occasional    • Drug use: No   • Sexual activity: None Other Topics Concern   • None   Social History Narrative   • None     Social Determinants of Health     Financial Resource Strain: Low Risk  (11/12/2023)    Overall Financial Resource Strain (CARDIA)    • Difficulty of Paying Living Expenses: Not hard at all   Food Insecurity: Not on file   Transportation Needs: No Transportation Needs (11/12/2023)    PRAPARE - Transportation    • Lack of Transportation (Medical): No    • Lack of Transportation (Non-Medical): No   Physical Activity: Not on file   Stress: Not on file   Social Connections: Not on file   Intimate Partner Violence: Not on file   Housing Stability: Not on file      Medications and Allergies:     Current Outpatient Medications   Medication Sig Dispense Refill   • albuterol (PROVENTIL HFA,VENTOLIN HFA) 90 mcg/act inhaler Inhale 2 puffs every 4 (four) hours as needed for wheezing     • Breo Ellipta 100-25 MCG/ACT inhaler USE 1 INHALATION BY MOUTH  ONCE DAILY AT THE SAME TIME EACH DAY . RINSE MOUTH  AFTER  each 3   • losartan (COZAAR) 50 mg tablet TAKE 1 TABLET BY MOUTH  DAILY 90 tablet 3   • multivitamin (THERAGRAN) TABS Take 1 tablet by mouth daily     • PreviDent 5000 Enamel Protect 1.1-5 % GEL      • rosuvastatin (CRESTOR) 10 MG tablet TAKE 1 TABLET BY MOUTH  DAILY AT BEDTIME 90 tablet 3     No current facility-administered medications for this visit.      Allergies   Allergen Reactions   • Molds & Smuts Shortness Of Breath     Mold and mildew      Immunizations:     Immunization History   Administered Date(s) Administered   • COVID-19 MODERNA VACC 0.5 ML IM 02/06/2021, 02/06/2021, 03/08/2021, 03/08/2021, 10/26/2021   • COVID-19 Moderna vac 6-11y or adult booster 50 mcg/0.5 mL 10/12/2023   • INFLUENZA 10/01/2022, 10/07/2023   • Influenza Quadrivalent Preservative Free 3 years and older IM 02/16/2017   • Influenza, high dose seasonal 0.7 mL 10/20/2020   • Pneumococcal Conjugate Vaccine 20-valent (Pcv20), Polysace 11/15/2023      Health Maintenance:         Topic Date Due   • Hepatitis C Screening  Never done   • Colorectal Cancer Screening  06/27/2024   • Breast Cancer Screening: Mammogram  07/28/2024     There are no preventive care reminders to display for this patient. Medicare Screening Tests and Risk Assessments:     Safia is here for her Subsequent Wellness visit. Health Risk Assessment:   Patient rates overall health as very good. Patient feels that their physical health rating is same. Patient is satisfied with their life. Eyesight was rated as same. Hearing was rated as much worse. Patient feels that their emotional and mental health rating is same. Patients states they are never, rarely angry. Patient states they are never, rarely unusually tired/fatigued. Pain experienced in the last 7 days has been none. Patient states that she has experienced no weight loss or gain in last 6 months. Depression Screening:   PHQ-2 Score: 0      Fall Risk Screening: In the past year, patient has experienced: no history of falling in past year      Urinary Incontinence Screening:   Patient has not leaked urine accidently in the last six months. Home Safety:  Patient does not have trouble with stairs inside or outside of their home. Patient has working smoke alarms and has working carbon monoxide detector. Home safety hazards include: none. Nutrition:   Current diet is Regular. Medications:   Patient is currently taking over-the-counter supplements. OTC medications include: see medication list. Patient is able to manage medications. Activities of Daily Living (ADLs)/Instrumental Activities of Daily Living (IADLs):   Walk and transfer into and out of bed and chair?: Yes  Dress and groom yourself?: Yes    Bathe or shower yourself?: Yes    Feed yourself?  Yes  Do your laundry/housekeeping?: Yes  Manage your money, pay your bills and track your expenses?: Yes  Make your own meals?: Yes    Do your own shopping?: Yes    Previous Hospitalizations:   Any hospitalizations or ED visits within the last 12 months?: No      Advance Care Planning:   Living will: Yes    Durable POA for healthcare: Yes    Advanced directive: Yes      Cognitive Screening:   Provider or family/friend/caregiver concerned regarding cognition?: No    PREVENTIVE SCREENINGS      Cardiovascular Screening:    General: Screening Not Indicated and History Lipid Disorder    Due for: Lipid Panel      Diabetes Screening:       Due for: Blood Glucose      Colorectal Cancer Screening:     General: Screening Current      Breast Cancer Screening:     General: Screening Current      Cervical Cancer Screening:    General: Screening Not Indicated      Osteoporosis Screening:      Due for: DXA Axial      Abdominal Aortic Aneurysm (AAA) Screening:        General: Screening Not Indicated      Lung Cancer Screening:     General: Screening Not Indicated      Hepatitis C Screening:    General: Screening Not Indicated    Screening, Brief Intervention, and Referral to Treatment (SBIRT)    Screening  Typical number of drinks in a day: 3  Typical number of drinks in a week: 21  Interpretation: Low risk drinking behavior. AUDIT-C Screenin) How often did you have a drink containing alcohol in the past year? 4 or more times a week  2) How many drinks did you have on a typical day when you were drinking in the past year? 3 to 4  3) How often did you have 6 or more drinks on one occasion in the past year? less than monthly    AUDIT-C Score: 5  Interpretation: Score 3-12 (female): POSITIVE screen for alcohol misuse    AUDIT Screenin) How often during the last year have you found that you were not able to stop drinking once you had started?  0 - never  5) How often during the last year have you failed to do what was normally expected from you because of drinking? 0 - never  6) How often during the last year have you needed a first drink in the morning to get yourself going after a heavy drinking session? 0 - never  7) How often during the last year have you had a feeling of guilt or remorse after drinking? 1 - less than monthly  8) How often during the last year have you been unable to remember what happened the night before because you had been drinking? 0 - never  9) Have you or someone else been injured as a result of your drinking? 0 - no  10) Has a relative or friend or a doctor or another health worker been concerned about your drinking or suggested you cut down? 2 - yes, but not in the last year    AUDIT Score: 8  Interpretation: Harmful or hazardous alcohol consumption    Single Item Drug Screening:  How often have you used an illegal drug (including marijuana) or a prescription medication for non-medical reasons in the past year? never    Single Item Drug Screen Score: 0  Interpretation: Negative screen for possible drug use disorder    No results found. Physical Exam:     /70 (BP Location: Left arm, Patient Position: Sitting, Cuff Size: Adult)   Pulse 84   Temp 98 °F (36.7 °C) (Temporal)   Resp 14   Ht 5' 11" (1.803 m)   Wt 83.5 kg (184 lb)   SpO2 97%   BMI 25.66 kg/m²     Physical Exam  Constitutional:       General: She is not in acute distress. Appearance: She is not ill-appearing. HENT:      Right Ear: A middle ear effusion is present. Left Ear: A middle ear effusion is present. Nose: No congestion or rhinorrhea. Cardiovascular:      Rate and Rhythm: Normal rate and regular rhythm. Heart sounds: No murmur heard. Pulmonary:      Effort: Pulmonary effort is normal. No respiratory distress. Breath sounds: No wheezing, rhonchi or rales. Musculoskeletal:         General: No swelling or tenderness. Right lower leg: No edema. Left lower leg: No edema. Skin:     Coloration: Skin is not pale. Neurological:      General: No focal deficit present. Mental Status: She is alert and oriented to person, place, and time.    Psychiatric: Mood and Affect: Mood normal.         Thought Content:  Thought content normal.         Judgment: Judgment normal.          Juan Yan MD

## 2023-11-16 ENCOUNTER — LAB (OUTPATIENT)
Dept: LAB | Facility: CLINIC | Age: 73
End: 2023-11-16
Payer: COMMERCIAL

## 2023-11-16 DIAGNOSIS — I10 BENIGN ESSENTIAL HYPERTENSION: ICD-10-CM

## 2023-11-16 DIAGNOSIS — E78.00 HYPERCHOLESTEROLEMIA: ICD-10-CM

## 2023-11-16 DIAGNOSIS — E21.3 HYPERPARATHYROIDISM (HCC): ICD-10-CM

## 2023-11-16 LAB
25(OH)D3 SERPL-MCNC: 39.9 NG/ML (ref 30–100)
ALBUMIN SERPL BCP-MCNC: 4.3 G/DL (ref 3.5–5)
ALP SERPL-CCNC: 61 U/L (ref 34–104)
ALT SERPL W P-5'-P-CCNC: 18 U/L (ref 7–52)
ANION GAP SERPL CALCULATED.3IONS-SCNC: 8 MMOL/L
AST SERPL W P-5'-P-CCNC: 21 U/L (ref 13–39)
BILIRUB SERPL-MCNC: 0.47 MG/DL (ref 0.2–1)
BUN SERPL-MCNC: 9 MG/DL (ref 5–25)
CALCIUM SERPL-MCNC: 10.6 MG/DL (ref 8.4–10.2)
CHLORIDE SERPL-SCNC: 105 MMOL/L (ref 96–108)
CHOLEST SERPL-MCNC: 177 MG/DL
CO2 SERPL-SCNC: 26 MMOL/L (ref 21–32)
CREAT SERPL-MCNC: 0.65 MG/DL (ref 0.6–1.3)
GFR SERPL CREATININE-BSD FRML MDRD: 89 ML/MIN/1.73SQ M
GLUCOSE P FAST SERPL-MCNC: 88 MG/DL (ref 65–99)
HDLC SERPL-MCNC: 80 MG/DL
LDLC SERPL CALC-MCNC: 86 MG/DL (ref 0–100)
NONHDLC SERPL-MCNC: 97 MG/DL
POTASSIUM SERPL-SCNC: 4.7 MMOL/L (ref 3.5–5.3)
PROT SERPL-MCNC: 6.2 G/DL (ref 6.4–8.4)
PTH-INTACT SERPL-MCNC: 126.8 PG/ML (ref 12–88)
SODIUM SERPL-SCNC: 139 MMOL/L (ref 135–147)
TRIGL SERPL-MCNC: 57 MG/DL
TSH SERPL DL<=0.05 MIU/L-ACNC: 2.82 UIU/ML (ref 0.45–4.5)

## 2023-11-16 PROCEDURE — 80061 LIPID PANEL: CPT

## 2023-11-16 PROCEDURE — 82306 VITAMIN D 25 HYDROXY: CPT

## 2023-11-16 PROCEDURE — 36415 COLL VENOUS BLD VENIPUNCTURE: CPT

## 2023-11-16 PROCEDURE — 84443 ASSAY THYROID STIM HORMONE: CPT

## 2023-11-16 PROCEDURE — 83970 ASSAY OF PARATHORMONE: CPT

## 2023-11-16 PROCEDURE — 80053 COMPREHEN METABOLIC PANEL: CPT

## 2023-11-20 ENCOUNTER — APPOINTMENT (OUTPATIENT)
Dept: RADIOLOGY | Facility: HOSPITAL | Age: 73
End: 2023-11-20
Attending: FAMILY MEDICINE
Payer: COMMERCIAL

## 2023-11-20 ENCOUNTER — HOSPITAL ENCOUNTER (OUTPATIENT)
Dept: RADIOLOGY | Facility: HOSPITAL | Age: 73
Discharge: HOME | End: 2023-11-20
Attending: FAMILY MEDICINE
Payer: COMMERCIAL

## 2023-11-20 VITALS — HEIGHT: 71 IN | BODY MASS INDEX: 25.2 KG/M2 | WEIGHT: 180 LBS

## 2023-11-20 DIAGNOSIS — Z78.0 POSTMENOPAUSAL: ICD-10-CM

## 2023-11-20 PROCEDURE — 77080 DXA BONE DENSITY AXIAL: CPT

## 2023-11-22 ENCOUNTER — TELEPHONE (OUTPATIENT)
Dept: FAMILY MEDICINE CLINIC | Facility: CLINIC | Age: 73
End: 2023-11-22

## 2023-11-22 NOTE — TELEPHONE ENCOUNTER
----- Message from Mike Del Cid MD sent at 11/22/2023  3:40 PM EST -----  Pl, advise pt -  simone is needed to discuss DXA scan -  showed osteoporosis

## 2023-11-22 NOTE — TELEPHONE ENCOUNTER
Called patient to schedule appointment. She says after she did test, she looked at instructions which said not to take multivitamins prior to test, and she took multi vitamin morning of test and centrum gummy has 100 mg of calcium. Patient is wondering if this is why her dexa result is off. Please advise.

## 2023-11-24 NOTE — TELEPHONE ENCOUNTER
Pl, advise pt -  supplements soul not affect the DXA scan much  -  likely due to hyperparathyroidism that is washing Calcium from pt's bones

## 2024-02-03 DIAGNOSIS — E78.00 HYPERCHOLESTEROLEMIA: ICD-10-CM

## 2024-02-03 DIAGNOSIS — J45.30 MILD PERSISTENT ASTHMA WITHOUT COMPLICATION: ICD-10-CM

## 2024-02-04 RX ORDER — ROSUVASTATIN CALCIUM 10 MG/1
TABLET, COATED ORAL
Qty: 90 TABLET | Refills: 3 | Status: SHIPPED | OUTPATIENT
Start: 2024-02-04

## 2024-02-04 RX ORDER — FLUTICASONE FUROATE AND VILANTEROL TRIFENATATE 100; 25 UG/1; UG/1
POWDER RESPIRATORY (INHALATION)
Qty: 180 EACH | Refills: 3 | Status: SHIPPED | OUTPATIENT
Start: 2024-02-04

## 2024-04-12 ENCOUNTER — OFFICE VISIT (OUTPATIENT)
Dept: FAMILY MEDICINE CLINIC | Facility: CLINIC | Age: 74
End: 2024-04-12
Payer: COMMERCIAL

## 2024-04-12 VITALS
BODY MASS INDEX: 25.48 KG/M2 | WEIGHT: 182 LBS | TEMPERATURE: 98.1 F | OXYGEN SATURATION: 95 % | SYSTOLIC BLOOD PRESSURE: 140 MMHG | HEIGHT: 71 IN | DIASTOLIC BLOOD PRESSURE: 70 MMHG | HEART RATE: 75 BPM | RESPIRATION RATE: 18 BRPM

## 2024-04-12 DIAGNOSIS — R09.81 SINUS CONGESTION: Primary | ICD-10-CM

## 2024-04-12 DIAGNOSIS — J45.40 MODERATE PERSISTENT ASTHMA WITHOUT COMPLICATION: ICD-10-CM

## 2024-04-12 PROCEDURE — 99213 OFFICE O/P EST LOW 20 MIN: CPT | Performed by: FAMILY MEDICINE

## 2024-04-12 PROCEDURE — G2211 COMPLEX E/M VISIT ADD ON: HCPCS | Performed by: FAMILY MEDICINE

## 2024-04-12 RX ORDER — ALBUTEROL SULFATE 90 UG/1
2 AEROSOL, METERED RESPIRATORY (INHALATION) EVERY 4 HOURS PRN
Qty: 18 G | Refills: 0 | Status: SHIPPED | OUTPATIENT
Start: 2024-04-12

## 2024-04-12 RX ORDER — AZITHROMYCIN 250 MG/1
TABLET, FILM COATED ORAL
Qty: 6 TABLET | Refills: 0 | Status: SHIPPED | OUTPATIENT
Start: 2024-04-12 | End: 2024-04-16

## 2024-04-12 RX ORDER — PREDNISONE 10 MG/1
TABLET ORAL
COMMUNITY
Start: 2023-12-12 | End: 2024-04-12

## 2024-04-12 NOTE — PROGRESS NOTES
Chief Complaint   Patient presents with   • Sinusitis     COVID test today was negative//began yesterday        Patient ID: Safia Moncada is a 73 y.o. female.    Sinus Problem  This is a new problem. The current episode started yesterday. The problem is unchanged. There has been no fever. Her pain is at a severity of 4/10. The pain is mild. Associated symptoms include congestion, headaches and sinus pressure. Pertinent negatives include no chills, coughing, diaphoresis, ear pain, hoarse voice, neck pain, shortness of breath, sneezing, sore throat or swollen glands. Past treatments include nothing. The treatment provided no relief.   Negative home COVID test today       The following portions of the patient's history were reviewed and updated as appropriate: allergies, current medications, past family history, past medical history, past social history, past surgical history and problem list.    Review of Systems   Constitutional:  Negative for chills and diaphoresis.   HENT:  Positive for congestion and sinus pressure. Negative for ear pain, hoarse voice, sneezing and sore throat.    Respiratory:  Negative for cough and shortness of breath.    Gastrointestinal: Negative.    Musculoskeletal:  Negative for neck pain.   Neurological:  Positive for headaches.       Current Outpatient Medications   Medication Sig Dispense Refill   • Breo Ellipta 100-25 MCG/ACT inhaler USE 1 INHALATION BY MOUTH ONCE  DAILY AT THE SAME TIME EACH DAY  RINSE MOUTH AFTER  each 3   • losartan (COZAAR) 50 mg tablet TAKE 1 TABLET BY MOUTH  DAILY 90 tablet 3   • multivitamin (THERAGRAN) TABS Take 1 tablet by mouth daily     • PreviDent 5000 Enamel Protect 1.1-5 % GEL      • rosuvastatin (CRESTOR) 10 MG tablet TAKE 1 TABLET BY MOUTH DAILY AT  BEDTIME 90 tablet 3   • albuterol (PROVENTIL HFA,VENTOLIN HFA) 90 mcg/act inhaler Inhale 2 puffs every 4 (four) hours as needed for wheezing (Patient not taking: Reported on 4/12/2024)     • predniSONE  "10 mg tablet 5 tabs po qd for 2 days then 4 tabs po qd for 2 days then 3 tabs po qd for 2 days then 2 tabs po qd for 2 days then 1 tab po qd for 2 days then stop (Patient not taking: Reported on 4/12/2024)       No current facility-administered medications for this visit.       Objective:    /70 (BP Location: Left arm, Patient Position: Sitting, Cuff Size: Large)   Pulse 75   Temp 98.1 °F (36.7 °C) (Temporal)   Resp 18   Ht 5' 11\" (1.803 m)   Wt 82.6 kg (182 lb)   SpO2 95%   BMI 25.38 kg/m²        Physical Exam  Constitutional:       General: She is not in acute distress.  HENT:      Right Ear: A PE tube is present.      Left Ear: A middle ear effusion is present. Tympanic membrane is not erythematous.      Nose: Congestion present. No rhinorrhea.      Right Sinus: No maxillary sinus tenderness or frontal sinus tenderness.      Left Sinus: No maxillary sinus tenderness or frontal sinus tenderness.      Mouth/Throat:      Pharynx: No oropharyngeal exudate or posterior oropharyngeal erythema.   Cardiovascular:      Rate and Rhythm: Normal rate and regular rhythm.   Pulmonary:      Effort: Pulmonary effort is normal. No respiratory distress.      Breath sounds: No wheezing, rhonchi or rales.   Neurological:      Mental Status: She is alert.                 Assessment/Plan:         Diagnoses and all orders for this visit:    Sinus congestion  -     azithromycin (ZITHROMAX) 250 mg tablet; Take 2 tablets today then 1 tablet daily x 4 days    Moderate persistent asthma without complication  -     albuterol (PROVENTIL HFA,VENTOLIN HFA) 90 mcg/act inhaler; Inhale 2 puffs every 4 (four) hours as needed for wheezing      Was advised to do another COVID test at home in 2 days     Rto prn                           Edna Gutierrez MD      "

## 2024-04-16 ENCOUNTER — OFFICE VISIT (OUTPATIENT)
Dept: FAMILY MEDICINE CLINIC | Facility: CLINIC | Age: 74
End: 2024-04-16
Payer: COMMERCIAL

## 2024-04-16 VITALS
HEIGHT: 71 IN | TEMPERATURE: 99 F | BODY MASS INDEX: 26.74 KG/M2 | OXYGEN SATURATION: 96 % | RESPIRATION RATE: 16 BRPM | SYSTOLIC BLOOD PRESSURE: 130 MMHG | HEART RATE: 94 BPM | WEIGHT: 191 LBS | DIASTOLIC BLOOD PRESSURE: 70 MMHG

## 2024-04-16 DIAGNOSIS — J01.00 ACUTE NON-RECURRENT MAXILLARY SINUSITIS: Primary | ICD-10-CM

## 2024-04-16 DIAGNOSIS — J45.40 MODERATE PERSISTENT ASTHMA WITHOUT COMPLICATION: ICD-10-CM

## 2024-04-16 DIAGNOSIS — I10 BENIGN ESSENTIAL HYPERTENSION: ICD-10-CM

## 2024-04-16 DIAGNOSIS — E21.3 HYPERPARATHYROIDISM (HCC): ICD-10-CM

## 2024-04-16 PROCEDURE — G2211 COMPLEX E/M VISIT ADD ON: HCPCS | Performed by: STUDENT IN AN ORGANIZED HEALTH CARE EDUCATION/TRAINING PROGRAM

## 2024-04-16 PROCEDURE — 99214 OFFICE O/P EST MOD 30 MIN: CPT | Performed by: STUDENT IN AN ORGANIZED HEALTH CARE EDUCATION/TRAINING PROGRAM

## 2024-04-16 RX ORDER — AMOXICILLIN AND CLAVULANATE POTASSIUM 875; 125 MG/1; MG/1
1 TABLET, FILM COATED ORAL EVERY 12 HOURS SCHEDULED
Qty: 14 TABLET | Refills: 0 | Status: SHIPPED | OUTPATIENT
Start: 2024-04-16 | End: 2024-04-23

## 2024-04-16 RX ORDER — METHYLPREDNISOLONE 4 MG/1
TABLET ORAL
Qty: 21 EACH | Refills: 0 | Status: SHIPPED | OUTPATIENT
Start: 2024-04-16

## 2024-04-16 NOTE — PROGRESS NOTES
Clinic Visit Note  Safia Moncada 73 y.o. female   MRN: [id number]    Assessment and Plan      Diagnoses and all orders for this visit:    Acute non-recurrent maxillary sinusitis  Symptoms consistent with upper respiratory tract infection, maxillary sinusitis, recommend transitioning to Augmentin therapy, steroid taper to help relieve congestion/inflammation, follow-up if symptoms worsen or not improving.  -     amoxicillin-clavulanate (AUGMENTIN) 875-125 mg per tablet; Take 1 tablet by mouth every 12 (twelve) hours for 7 days    Moderate persistent asthma without complication  Continue Breo, steroid taper  -     methylPREDNISolone 4 MG tablet therapy pack; Use as directed on package    Hyperparathyroidism (HCC)    Benign essential hypertension  Blood pressure stable, continue antihypertensive medication    My impressions and treatment recommendations were discussed in detail with the patient who verbalized understanding and had no further questions.  Discharge instructions were provided.    Subjective     Chief Complaint: ACV    History of Present Illness:    Patient is a pleasant 73-year-old female coming in for reevaluation of upper tract infection symptoms, no symptomatic relief with azithromycin.    The following portions of the patient's history were reviewed and updated as appropriate: allergies, current medications, past family history, past medical history, past social history, past surgical history and problem list.    REVIEW OF SYSTEMS:  A complete 12-point review of systems is negative other than that noted in the HPI.    Review of Systems   Constitutional:  Positive for fatigue. Negative for chills and fever.   HENT:  Positive for congestion, postnasal drip and sinus pressure. Negative for sore throat.    Respiratory:  Positive for cough. Negative for shortness of breath and wheezing.    Cardiovascular:  Negative for chest pain, palpitations and leg swelling.   Neurological:  Negative for dizziness and  headaches.         Current Outpatient Medications:   •  albuterol (PROVENTIL HFA,VENTOLIN HFA) 90 mcg/act inhaler, Inhale 2 puffs every 4 (four) hours as needed for wheezing, Disp: 18 g, Rfl: 0  •  amoxicillin-clavulanate (AUGMENTIN) 875-125 mg per tablet, Take 1 tablet by mouth every 12 (twelve) hours for 7 days, Disp: 14 tablet, Rfl: 0  •  Breo Ellipta 100-25 MCG/ACT inhaler, USE 1 INHALATION BY MOUTH ONCE  DAILY AT THE SAME TIME EACH DAY  RINSE MOUTH AFTER USE, Disp: 180 each, Rfl: 3  •  losartan (COZAAR) 50 mg tablet, TAKE 1 TABLET BY MOUTH  DAILY, Disp: 90 tablet, Rfl: 3  •  methylPREDNISolone 4 MG tablet therapy pack, Use as directed on package, Disp: 21 each, Rfl: 0  •  multivitamin (THERAGRAN) TABS, Take 1 tablet by mouth daily, Disp: , Rfl:   •  PreviDent 5000 Enamel Protect 1.1-5 % GEL, , Disp: , Rfl:   •  rosuvastatin (CRESTOR) 10 MG tablet, TAKE 1 TABLET BY MOUTH DAILY AT  BEDTIME, Disp: 90 tablet, Rfl: 3  Past Medical History:   Diagnosis Date   • Abnormal inflammatory bowel disease panel     last assessed: 09/03/14   • Asthma    • Chest wall contusion     last assessed: 04/21/17   • Dermatomycosis 08/02/2011   • Hordeolum internum of left eye     last assessed: 06/20/13   • Hx of oral aphthous ulcers 08/22/2011   • Hypercalcemia 01/11/2011   • Hyperlipidemia    • Hypertension    • Sciatica 05/02/2008   • Ulceration of intestine     last assessed: 9/03/14     Past Surgical History:   Procedure Laterality Date   • MYRINGOTOMY Bilateral      Social History     Socioeconomic History   • Marital status:      Spouse name: Not on file   • Number of children: Not on file   • Years of education: Not on file   • Highest education level: Not on file   Occupational History   • Not on file   Tobacco Use   • Smoking status: Former     Current packs/day: 0.00     Average packs/day: 2.0 packs/day for 20.0 years (40.0 ttl pk-yrs)     Types: Cigarettes     Start date: 6/20/1985     Quit date: 6/20/2005     Years  "since quittin.8   • Smokeless tobacco: Never   Vaping Use   • Vaping status: Never Used   Substance and Sexual Activity   • Alcohol use: Yes     Comment: occasional    • Drug use: No   • Sexual activity: Not on file   Other Topics Concern   • Not on file   Social History Narrative   • Not on file     Social Determinants of Health     Financial Resource Strain: Low Risk  (2023)    Overall Financial Resource Strain (CARDIA)    • Difficulty of Paying Living Expenses: Not hard at all   Food Insecurity: Not on file   Transportation Needs: No Transportation Needs (2023)    PRAPARE - Transportation    • Lack of Transportation (Medical): No    • Lack of Transportation (Non-Medical): No   Physical Activity: Not on file   Stress: Not on file   Social Connections: Not on file   Intimate Partner Violence: Not on file   Housing Stability: Not on file     Family History   Problem Relation Age of Onset   • No Known Problems Mother         macular degeneration per allscripts   • No Known Problems Father    • No Known Problems Maternal Aunt    • No Known Problems Paternal Aunt      Allergies   Allergen Reactions   • Molds & Smuts Shortness Of Breath     Mold and mildew       Objective     Vitals:    24 0947   BP: 130/70   BP Location: Left arm   Patient Position: Sitting   Cuff Size: Standard   Pulse: 94   Resp: 16   Temp: 99 °F (37.2 °C)   TempSrc: Temporal   SpO2: 96%   Weight: 86.6 kg (191 lb)   Height: 5' 11\" (1.803 m)       Physical Exam:     GENERAL: NAD, pleasant   HEENT:  NC/AT, PERRL, EOMI, no scleral icterus, pharynx erythema with tympanic membrane swelling with cloudy fluid  CARDIAC:  RRR, +S1/S2, no S3/S4 appreciated, no m/g/r  PULMONARY:  CTA B/L, no wheezing/rales/rhonci, non-labored breathing  ABDOMEN:  Soft, NT/ND, no rebound/guarding/rigidity  Extremities:. No edema, cyanosis, or clubbing  Musculoskeletal:  Full range of motion intact in all extremities   NEUROLOGIC: Grossly intact, no focal " deficits  SKIN:  No rashes or erythema noted on exposed skin  Psych: Normal affect, mood stable    ==  PLEASE NOTE:  This encounter was completed utilizing the [employer] Direct Speech Voice Recognition Software. Grammatical errors, random word insertions, pronoun errors and incomplete sentences are occasional consequences of the system due to software limitations, ambient noise and hardware issues.These may be missed by proof reading prior to affixing electronic signature. Any questions or concerns about the content, text or information contained within the body of this dictation should be directly addressed to the physician for clarification. Please do not hesitate to call me directly if you have any any questions or concerns.    Prakash Riggs, DO  [facility] Internal Medicine   [facility]

## 2024-04-17 ENCOUNTER — TELEPHONE (OUTPATIENT)
Age: 74
End: 2024-04-17

## 2024-04-17 NOTE — TELEPHONE ENCOUNTER
Pt was just calling to thank Dr. Riggs stating that the medication he prescribed her helped her out so much and is already starting to feel like herself. Pt stated that the Z pack did not help her at all and if anything only made her feel worse. Pt just want to share her gratitude to Dr. Riggs thank you.

## 2024-04-24 ENCOUNTER — TELEPHONE (OUTPATIENT)
Age: 74
End: 2024-04-24

## 2024-04-24 DIAGNOSIS — J01.00 ACUTE NON-RECURRENT MAXILLARY SINUSITIS: Primary | ICD-10-CM

## 2024-04-24 RX ORDER — DOXYCYCLINE HYCLATE 100 MG/1
100 CAPSULE ORAL EVERY 12 HOURS SCHEDULED
Qty: 14 CAPSULE | Refills: 0 | Status: SHIPPED | OUTPATIENT
Start: 2024-04-24 | End: 2024-05-01

## 2024-04-24 NOTE — TELEPHONE ENCOUNTER
Patient calling stating she was seen on 4/12 and 4/16 and was given ABX.  She is not feeling better and now has swollen glands.  She is wondering if another round of ABX can be called in or does she need to come in to be reevaluated?  I did let her know she may have to come in for appointment but would check with provider first.  She expressed understanding.    Please call patient and advise if ABX will be called in or if appointment is need.  Please note once called.  Thank you       [facility] in [place]

## 2024-05-13 DIAGNOSIS — J45.40 MODERATE PERSISTENT ASTHMA WITHOUT COMPLICATION: ICD-10-CM

## 2024-05-14 RX ORDER — ALBUTEROL SULFATE 90 UG/1
2 AEROSOL, METERED RESPIRATORY (INHALATION) EVERY 4 HOURS PRN
Qty: 18 G | Refills: 1 | Status: SHIPPED | OUTPATIENT
Start: 2024-05-14

## 2024-06-01 DIAGNOSIS — I10 BENIGN ESSENTIAL HYPERTENSION: ICD-10-CM

## 2024-06-02 RX ORDER — LOSARTAN POTASSIUM 50 MG/1
TABLET ORAL
Qty: 90 TABLET | Refills: 1 | Status: SHIPPED | OUTPATIENT
Start: 2024-06-02

## 2024-06-28 DIAGNOSIS — J45.40 MODERATE PERSISTENT ASTHMA WITHOUT COMPLICATION: ICD-10-CM

## 2024-06-29 RX ORDER — ALBUTEROL SULFATE 90 UG/1
AEROSOL, METERED RESPIRATORY (INHALATION)
Qty: 26.8 G | Refills: 4 | Status: SHIPPED | OUTPATIENT
Start: 2024-06-29

## 2024-10-03 DIAGNOSIS — I10 BENIGN ESSENTIAL HYPERTENSION: ICD-10-CM

## 2024-10-03 RX ORDER — LOSARTAN POTASSIUM 50 MG/1
TABLET ORAL
Qty: 90 TABLET | Refills: 3 | Status: SHIPPED | OUTPATIENT
Start: 2024-10-03

## 2025-01-09 ENCOUNTER — OFFICE VISIT (OUTPATIENT)
Dept: FAMILY MEDICINE CLINIC | Facility: CLINIC | Age: 75
End: 2025-01-09
Payer: COMMERCIAL

## 2025-01-09 VITALS
DIASTOLIC BLOOD PRESSURE: 70 MMHG | OXYGEN SATURATION: 98 % | SYSTOLIC BLOOD PRESSURE: 102 MMHG | HEIGHT: 71 IN | HEART RATE: 76 BPM | WEIGHT: 185 LBS | RESPIRATION RATE: 12 BRPM | BODY MASS INDEX: 25.9 KG/M2 | TEMPERATURE: 97.8 F

## 2025-01-09 DIAGNOSIS — I10 BENIGN ESSENTIAL HYPERTENSION: Primary | ICD-10-CM

## 2025-01-09 DIAGNOSIS — E78.00 HYPERCHOLESTEROLEMIA: ICD-10-CM

## 2025-01-09 DIAGNOSIS — E21.3 HYPERPARATHYROIDISM (HCC): ICD-10-CM

## 2025-01-09 DIAGNOSIS — Z23 ENCOUNTER FOR IMMUNIZATION: ICD-10-CM

## 2025-01-09 DIAGNOSIS — Z00.00 MEDICARE ANNUAL WELLNESS VISIT, SUBSEQUENT: ICD-10-CM

## 2025-01-09 PROCEDURE — 99214 OFFICE O/P EST MOD 30 MIN: CPT | Performed by: FAMILY MEDICINE

## 2025-01-09 PROCEDURE — G0439 PPPS, SUBSEQ VISIT: HCPCS | Performed by: FAMILY MEDICINE

## 2025-01-09 PROCEDURE — 90678 RSV VACC PREF BIVALENT IM: CPT | Performed by: FAMILY MEDICINE

## 2025-01-09 PROCEDURE — 90471 IMMUNIZATION ADMIN: CPT | Performed by: FAMILY MEDICINE

## 2025-01-09 NOTE — ASSESSMENT & PLAN NOTE
Stable  Cont med  Orders:  •  Comprehensive metabolic panel; Future  •  Lipid panel; Future  •  TSH, 3rd generation; Future

## 2025-01-09 NOTE — PATIENT INSTRUCTIONS
Medicare Preventive Visit Patient Instructions  Thank you for completing your Welcome to Medicare Visit or Medicare Annual Wellness Visit today. Your next wellness visit will be due in one year (1/10/2026).  The screening/preventive services that you may require over the next 5-10 years are detailed below. Some tests may not apply to you based off risk factors and/or age. Screening tests ordered at today's visit but not completed yet may show as past due. Also, please note that scanned in results may not display below.  Preventive Screenings:  Service Recommendations Previous Testing/Comments   Colorectal Cancer Screening  * Colonoscopy    * Fecal Occult Blood Test (FOBT)/Fecal Immunochemical Test (FIT)  * Fecal DNA/Cologuard Test  * Flexible Sigmoidoscopy Age: 45-75 years old   Colonoscopy: every 10 years (may be performed more frequently if at higher risk)  OR  FOBT/FIT: every 1 year  OR  Cologuard: every 3 years  OR  Sigmoidoscopy: every 5 years  Screening may be recommended earlier than age 45 if at higher risk for colorectal cancer. Also, an individualized decision between you and your healthcare provider will decide whether screening between the ages of 76-85 would be appropriate. Colonoscopy: 06/27/2014  FOBT/FIT: Not on file  Cologuard: Not on file  Sigmoidoscopy: Not on file          Breast Cancer Screening Age: 40+ years old  Frequency: every 1-2 years  Not required if history of left and right mastectomy Mammogram: 07/28/2023    Screening Current   Cervical Cancer Screening Between the ages of 21-29, pap smear recommended once every 3 years.   Between the ages of 30-65, can perform pap smear with HPV co-testing every 5 years.   Recommendations may differ for women with a history of total hysterectomy, cervical cancer, or abnormal pap smears in past. Pap Smear: Not on file    Screening Not Indicated   Hepatitis C Screening Once for adults born between 1945 and 1965  More frequently in patients at high risk  for Hepatitis C Hep C Antibody: Not on file        Diabetes Screening 1-2 times per year if you're at risk for diabetes or have pre-diabetes Fasting glucose: 88 mg/dL (11/16/2023)  A1C: No results in last 5 years (No results in last 5 years)      Cholesterol Screening Once every 5 years if you don't have a lipid disorder. May order more often based on risk factors. Lipid panel: 11/16/2023    Screening Not Indicated  History Lipid Disorder     Other Preventive Screenings Covered by Medicare:  Abdominal Aortic Aneurysm (AAA) Screening: covered once if your at risk. You're considered to be at risk if you have a family history of AAA.  Lung Cancer Screening: covers low dose CT scan once per year if you meet all of the following conditions: (1) Age 55-77; (2) No signs or symptoms of lung cancer; (3) Current smoker or have quit smoking within the last 15 years; (4) You have a tobacco smoking history of at least 20 pack years (packs per day multiplied by number of years you smoked); (5) You get a written order from a healthcare provider.  Glaucoma Screening: covered annually if you're considered high risk: (1) You have diabetes OR (2) Family history of glaucoma OR (3)  aged 50 and older OR (4)  American aged 65 and older  Osteoporosis Screening: covered every 2 years if you meet one of the following conditions: (1) You're estrogen deficient and at risk for osteoporosis based off medical history and other findings; (2) Have a vertebral abnormality; (3) On glucocorticoid therapy for more than 3 months; (4) Have primary hyperparathyroidism; (5) On osteoporosis medications and need to assess response to drug therapy.   Last bone density test (DXA Scan): 11/20/2023.  HIV Screening: covered annually if you're between the age of 15-65. Also covered annually if you are younger than 15 and older than 65 with risk factors for HIV infection. For pregnant patients, it is covered up to 3 times per  pregnancy.    Immunizations:  Immunization Recommendations   Influenza Vaccine Annual influenza vaccination during flu season is recommended for all persons aged >= 6 months who do not have contraindications   Pneumococcal Vaccine   * Pneumococcal conjugate vaccine = PCV13 (Prevnar 13), PCV15 (Vaxneuvance), PCV20 (Prevnar 20)  * Pneumococcal polysaccharide vaccine = PPSV23 (Pneumovax) Adults 19-65 yo with certain risk factors or if 65+ yo  If never received any pneumonia vaccine: recommend Prevnar 20 (PCV20)  Give PCV20 if previously received 1 dose of PCV13 or PPSV23   Hepatitis B Vaccine 3 dose series if at intermediate or high risk (ex: diabetes, end stage renal disease, liver disease)   Respiratory syncytial virus (RSV) Vaccine - COVERED BY MEDICARE PART D  * RSVPreF3 (Arexvy) CDC recommends that adults 60 years of age and older may receive a single dose of RSV vaccine using shared clinical decision-making (SCDM)   Tetanus (Td) Vaccine - COST NOT COVERED BY MEDICARE PART B Following completion of primary series, a booster dose should be given every 10 years to maintain immunity against tetanus. Td may also be given as tetanus wound prophylaxis.   Tdap Vaccine - COST NOT COVERED BY MEDICARE PART B Recommended at least once for all adults. For pregnant patients, recommended with each pregnancy.   Shingles Vaccine (Shingrix) - COST NOT COVERED BY MEDICARE PART B  2 shot series recommended in those 19 years and older who have or will have weakened immune systems or those 50 years and older     Health Maintenance Due:      Topic Date Due   • Hepatitis C Screening  Never done   • Colorectal Cancer Screening  01/09/2026 (Originally 9/18/1997)   • Breast Cancer Screening: Mammogram  01/09/2026 (Originally 7/28/2024)     Immunizations Due:  There are no preventive care reminders to display for this patient.  Advance Directives   What are advance directives?  Advance directives are legal documents that state your wishes  and plans for medical care. These plans are made ahead of time in case you lose your ability to make decisions for yourself. Advance directives can apply to any medical decision, such as the treatments you want, and if you want to donate organs.   What are the types of advance directives?  There are many types of advance directives, and each state has rules about how to use them. You may choose a combination of any of the following:  Living will:  This is a written record of the treatment you want. You can also choose which treatments you do not want, which to limit, and which to stop at a certain time. This includes surgery, medicine, IV fluid, and tube feedings.   Durable power of  for healthcare (DPAHC):  This is a written record that states who you want to make healthcare choices for you when you are unable to make them for yourself. This person, called a proxy, is usually a family member or a friend. You may choose more than 1 proxy.  Do not resuscitate (DNR) order:  A DNR order is used in case your heart stops beating or you stop breathing. It is a request not to have certain forms of treatment, such as CPR. A DNR order may be included in other types of advance directives.  Medical directive:  This covers the care that you want if you are in a coma, near death, or unable to make decisions for yourself. You can list the treatments you want for each condition. Treatment may include pain medicine, surgery, blood transfusions, dialysis, IV or tube feedings, and a ventilator (breathing machine).  Values history:  This document has questions about your views, beliefs, and how you feel and think about life. This information can help others choose the care that you would choose.  Why are advance directives important?  An advance directive helps you control your care. Although spoken wishes may be used, it is better to have your wishes written down. Spoken wishes can be misunderstood, or not followed.  Treatments may be given even if you do not want them. An advance directive may make it easier for your family to make difficult choices about your care.   Weight Management   Why it is important to manage your weight:  Being overweight increases your risk of health conditions such as heart disease, high blood pressure, type 2 diabetes, and certain types of cancer. It can also increase your risk for osteoarthritis, sleep apnea, and other respiratory problems. Aim for a slow, steady weight loss. Even a small amount of weight loss can lower your risk of health problems.  How to lose weight safely:  A safe and healthy way to lose weight is to eat fewer calories and get regular exercise. You can lose up about 1 pound a week by decreasing the number of calories you eat by 500 calories each day.   Healthy meal plan for weight management:  A healthy meal plan includes a variety of foods, contains fewer calories, and helps you stay healthy. A healthy meal plan includes the following:  Eat whole-grain foods more often.  A healthy meal plan should contain fiber. Fiber is the part of grains, fruits, and vegetables that is not broken down by your body. Whole-grain foods are healthy and provide extra fiber in your diet. Some examples of whole-grain foods are whole-wheat breads and pastas, oatmeal, brown rice, and bulgur.  Eat a variety of vegetables every day.  Include dark, leafy greens such as spinach, kale, ata greens, and mustard greens. Eat yellow and orange vegetables such as carrots, sweet potatoes, and winter squash.   Eat a variety of fruits every day.  Choose fresh or canned fruit (canned in its own juice or light syrup) instead of juice. Fruit juice has very little or no fiber.  Eat low-fat dairy foods.  Drink fat-free (skim) milk or 1% milk. Eat fat-free yogurt and low-fat cottage cheese. Try low-fat cheeses such as mozzarella and other reduced-fat cheeses.  Choose meat and other protein foods that are low in fat.  " Choose beans or other legumes such as split peas or lentils. Choose fish, skinless poultry (chicken or turkey), or lean cuts of red meat (beef or pork). Before you cook meat or poultry, cut off any visible fat.   Use less fat and oil.  Try baking foods instead of frying them. Add less fat, such as margarine, sour cream, regular salad dressing and mayonnaise to foods. Eat fewer high-fat foods. Some examples of high-fat foods include french fries, doughnuts, ice cream, and cakes.  Eat fewer sweets.  Limit foods and drinks that are high in sugar. This includes candy, cookies, regular soda, and sweetened drinks.  Exercise:  Exercise at least 30 minutes per day on most days of the week. Some examples of exercise include walking, biking, dancing, and swimming. You can also fit in more physical activity by taking the stairs instead of the elevator or parking farther away from stores. Ask your healthcare provider about the best exercise plan for you.   Alcohol Use and Your Health    Drinking too much can harm your health.  Excessive alcohol use leads to about 88,000 death in the [place] each year, and shortens the life of those who diet by almost 30 years.  Further, excessive drinking cost the economy $249 billion in 2010.  Most excessive drinkers are not alcohol dependent.    Excessive alcohol use has immediate effects that increase the risk of many harmful health conditions.  These are most often the result of binge drinking.  Over time, excessive alcohol use can lead to the development of chronic diseases and other series health problems.    What is considered a \"drink\"?        Excessive alcohol use includes:  Binge Drinking: For women, 4 or more drinks consumed on one occasion. For men, 5 or more drinks consumed on one occasion.  Heavy Drinking: For women, 8 or more drinks per week. For men, 15 or more drinks per week  Any alcohol used by pregnant women  Any alcohol used by those under the age of 21 years    If " you choose to drink, do so in moderation:  Do not drink at all if you are under the age of 21, or if you are or may be pregnant, or have health problems that could be made worse by drinking.  For women, up to 1 drink per day  For men, up to 2 drinks a day    No one should begin drinking or drink more frequently based on potential health benefits    Short-Term Health Risks:  Injuries: motor vehicle crashes, falls, drownings, burns  Violence: homicide, suicide, sexual assault, intimate partner violence  Alcohol poisoning  Reproductive health: risky sexual behaviors, unintended prengnacy, sexually transmitted diseases, miscarriage, stillbirth, fetal alcohol syndrome    Long-Term Health Risks:  Chronic diseases: high blood pressure, heart disease, stroke, liver disease, digestive problems  Cancers: breast, mouth and throat, liver, colon  Learning and memory problems: dementia, poor school performance  Mental health: depression, anxiety, insomnia  Social problems: lost productivity, family problems, unemployment  Alcohol dependence    For support and more information:  Substance Abuse and Mental Health Services Administration  [address]  [place] [address]  Web Address: http://www.samhsa.gov    Alcoholics Anonymous        Web Address: http://www.aa.org    https://www.cdc.gov/alcohol/fact-sheets/alcohol-use.htm     © Copyright [employer] 2018 Information is for End User's use only and may not be sold, redistributed or otherwise used for commercial purposes. All illustrations and images included in CareNotes® are the copyrighted property of [employer]., Inc. or [employer]

## 2025-01-09 NOTE — PROGRESS NOTES
Name: Safia Moncada      : 1950      MRN: [id number]  Encounter Provider: Edna Gutierrez MD  Encounter Date: 2025   Encounter department: [facility]    Assessment & Plan  Medicare annual wellness visit, subsequent         Benign essential hypertension  Stable  Cont med  Orders:  •  Comprehensive metabolic panel; Future  •  Lipid panel; Future  •  TSH, 3rd generation; Future    Hyperparathyroidism (HCC)  stable  Orders:  •  TSH, 3rd generation; Future  •  PTH, intact; Future  •  Vitamin D 25 hydroxy; Future    Hypercholesterolemia  Stable  Cont med  Orders:  •  Lipid panel; Future    Encounter for immunization    Orders:  •  Respiratory Syncytial Virus (RSV) vaccine (recombinant) (Abrysvo)       Preventive health issues were discussed with patient, and age appropriate screening tests were ordered as noted in patient's After Visit Summary. Personalized health advice and appropriate referrals for health education or preventive services given if needed, as noted in patient's After Visit Summary.    History of Present Illness     Pt is seeing for f/u HTN, HLD, Hyperparathyroidism  -  all stable        Patient Care Team:  Edna Gutierrez MD as PCP - General Lupe Henson MD    Review of Systems   Constitutional: Negative.    Respiratory: Negative.     Cardiovascular: Negative.    Gastrointestinal: Negative.    Genitourinary: Negative.    Musculoskeletal: Negative.    Skin: Negative.    Neurological: Negative.    Psychiatric/Behavioral: Negative.       Medical History Reviewed by provider this encounter:  Tobacco  Allergies  Meds  Problems  Med Hx  Surg Hx  Fam Hx       Annual Wellness Visit Questionnaire   Safia is here for her Subsequent Wellness visit.     Health Risk Assessment:   Patient rates overall health as very good. Patient feels that their physical health rating is same. Patient is satisfied with their life. Eyesight was rated as same. Hearing was rated as same.  Patient feels that their emotional and mental health rating is same. Patients states they are never, rarely angry. Patient states they are never, rarely unusually tired/fatigued. Pain experienced in the last 7 days has been none. Patient states that she has experienced no weight loss or gain in last 6 months.     Depression Screening:   PHQ-2 Score: 0      Fall Risk Screening:   In the past year, patient has experienced: no history of falling in past year      Urinary Incontinence Screening:   Patient has not leaked urine accidently in the last six months.     Home Safety:  Patient does not have trouble with stairs inside or outside of their home. Patient has working smoke alarms and has working carbon monoxide detector. Home safety hazards include: none.     Nutrition:   Current diet is Regular.     Medications:   Patient is not currently taking any over-the-counter supplements. Patient is able to manage medications.     Activities of Daily Living (ADLs)/Instrumental Activities of Daily Living (IADLs):   Walk and transfer into and out of bed and chair?: Yes  Dress and groom yourself?: Yes    Bathe or shower yourself?: Yes    Feed yourself? Yes  Do your laundry/housekeeping?: Yes  Manage your money, pay your bills and track your expenses?: Yes  Make your own meals?: Yes    Do your own shopping?: Yes    Previous Hospitalizations:   Any hospitalizations or ED visits within the last 12 months?: No      Advance Care Planning:   Living will: Yes    Durable POA for healthcare: Yes    Advanced directive: Yes      Cognitive Screening:   Provider or family/friend/caregiver concerned regarding cognition?: No    PREVENTIVE SCREENINGS      Cardiovascular Screening:    General: Screening Not Indicated and History Lipid Disorder    Due for: Lipid Panel      Diabetes Screening:       Due for: Blood Glucose      Colorectal Cancer Screening:     General: Patient Declines      Breast Cancer Screening:     General: Patient  Declines      Cervical Cancer Screening:    General: Screening Not Indicated      Osteoporosis Screening:    General: Screening Current      Abdominal Aortic Aneurysm (AAA) Screening:        General: Screening Not Indicated      Lung Cancer Screening:     General: Screening Not Indicated      Hepatitis C Screening:    General: Screening Not Indicated    Screening, Brief Intervention, and Referral to Treatment (SBIRT)    Screening  Typical number of drinks in a day: 3  Typical number of drinks in a week: 20  Interpretation: Low risk drinking behavior.    AUDIT-C Screenin) How often did you have a drink containing alcohol in the past year? 4 or more times a week  2) How many drinks did you have on a typical day when you were drinking in the past year? 3 to 4  3) How often did you have 6 or more drinks on one occasion in the past year? never    AUDIT-C Score: 4  Interpretation: Score 3-12 (female): POSITIVE screen for alcohol misuse    AUDIT Screenin) How often during the last year have you found that you were not able to stop drinking once you had started? 0 - never  5) How often during the last year have you failed to do what was normally expected from you because of drinking? 0 - never  6) How often during the last year have you needed a first drink in the morning to get yourself going after a heavy drinking session? 0 - never  7) How often during the last year have you had a feeling of guilt or remorse after drinking? 0 - never  8) How often during the last year have you been unable to remember what happened the night before because you had been drinking? 0 - never  9) Have you or someone else been injured as a result of your drinking? 0 - no  10) Has a relative or friend or a doctor or another health worker been concerned about your drinking or suggested you cut down? 2 - yes, but not in the last year    AUDIT Score: 6  Interpretation: Low risk alcohol consumption    Single Item Drug Screening:  How  "often have you used an illegal drug (including marijuana) or a prescription medication for non-medical reasons in the past year? less than monthly    Single Item Drug Screen Score: 1  Interpretation: POSITIVE screen for possible drug use disorder    Drug Abuse Screening Test (DAST-10):  1) Have you used drugs other than those required for medical reasons? No  2) Do you abuse more than one drug at a time? No  3) Are you always able to stop using drugs when you want to? Yes  4) Have you had \"blackouts\" or \"flashbacks\" as a result of drug use? No  5) Do you ever feel bad or guilty about your drug use? No  6) Does your spouse (or parents) ever complain about your involvement with drugs? No  7) Have you neglected your family because of your use of drugs? No  8) Have you engaged in illegal activities in order to obtain drugs? No  9) Have you ever experienced withdrawal symptoms (felt sick) when you stopped taking drugs? No  10) Have you had medical problems as a result of your drug use (e.g., memory loss, hepatitis, convulsions, bleeding, etc.)? No    DAST-10 Score: 0  Interpretation: No problems reported    Social Drivers of Health     Financial Resource Strain: Low Risk  (11/12/2023)    Overall Financial Resource Strain (CARDIA)    • Difficulty of Paying Living Expenses: Not hard at all   Food Insecurity: No Food Insecurity (1/9/2025)    Hunger Vital Sign    • Worried About Running Out of Food in the Last Year: Never true    • Ran Out of Food in the Last Year: Never true   Transportation Needs: No Transportation Needs (1/9/2025)    PRAPARE - Transportation    • Lack of Transportation (Medical): No    • Lack of Transportation (Non-Medical): No   Housing Stability: Low Risk  (1/9/2025)    Housing Stability Vital Sign    • Unable to Pay for Housing in the Last Year: No    • Number of Times Moved in the Last Year: 0    • Homeless in the Last Year: No   Utilities: Not At Risk (1/9/2025)    [facility] Utilities    • Threatened with " "loss of utilities: No     No results found.    Objective   /70 (BP Location: Left arm, Patient Position: Sitting, Cuff Size: Large)   Pulse 76   Temp 97.8 °F (36.6 °C) (Temporal)   Resp 12   Ht 5' 11\" (1.803 m)   Wt 83.9 kg (185 lb)   SpO2 98%   BMI 25.80 kg/m²     Physical Exam  Constitutional:       General: She is not in acute distress.     Appearance: She is not ill-appearing.   Cardiovascular:      Rate and Rhythm: Normal rate and regular rhythm.      Heart sounds: No murmur heard.     No gallop.   Pulmonary:      Effort: Pulmonary effort is normal. No respiratory distress.      Breath sounds: No wheezing, rhonchi or rales.   Abdominal:      Palpations: Abdomen is soft.      Tenderness: There is no abdominal tenderness.   Musculoskeletal:      Right lower leg: No edema.      Left lower leg: No edema.   Neurological:      Mental Status: She is alert and oriented to person, place, and time.      Cranial Nerves: No cranial nerve deficit.      Motor: No weakness.      Gait: Gait normal.   Psychiatric:         Mood and Affect: Mood normal.         Thought Content: Thought content normal.         Judgment: Judgment normal.         "

## 2025-01-09 NOTE — ASSESSMENT & PLAN NOTE
stable  Orders:  •  TSH, 3rd generation; Future  •  PTH, intact; Future  •  Vitamin D 25 hydroxy; Future

## 2025-01-16 ENCOUNTER — APPOINTMENT (OUTPATIENT)
Dept: LAB | Facility: CLINIC | Age: 75
End: 2025-01-16
Payer: COMMERCIAL

## 2025-01-16 DIAGNOSIS — E78.00 HYPERCHOLESTEROLEMIA: ICD-10-CM

## 2025-01-16 DIAGNOSIS — I10 BENIGN ESSENTIAL HYPERTENSION: ICD-10-CM

## 2025-01-16 DIAGNOSIS — E21.3 HYPERPARATHYROIDISM (HCC): ICD-10-CM

## 2025-01-16 LAB
25(OH)D3 SERPL-MCNC: 38.4 NG/ML (ref 30–100)
PTH-INTACT SERPL-MCNC: 163.7 PG/ML (ref 12–88)
TSH SERPL DL<=0.05 MIU/L-ACNC: 3.64 UIU/ML (ref 0.45–4.5)

## 2025-01-16 PROCEDURE — 80061 LIPID PANEL: CPT

## 2025-01-16 PROCEDURE — 83970 ASSAY OF PARATHORMONE: CPT

## 2025-01-16 PROCEDURE — 36415 COLL VENOUS BLD VENIPUNCTURE: CPT

## 2025-01-16 PROCEDURE — 80053 COMPREHEN METABOLIC PANEL: CPT

## 2025-01-16 PROCEDURE — 82306 VITAMIN D 25 HYDROXY: CPT

## 2025-01-16 PROCEDURE — 84443 ASSAY THYROID STIM HORMONE: CPT

## 2025-01-17 LAB
ALBUMIN SERPL BCG-MCNC: 4.5 G/DL (ref 3.5–5)
ALP SERPL-CCNC: 54 U/L (ref 34–104)
ALT SERPL W P-5'-P-CCNC: 24 U/L (ref 7–52)
ANION GAP SERPL CALCULATED.3IONS-SCNC: 10 MMOL/L (ref 4–13)
AST SERPL W P-5'-P-CCNC: 23 U/L (ref 13–39)
BILIRUB SERPL-MCNC: 0.41 MG/DL (ref 0.2–1)
BUN SERPL-MCNC: 19 MG/DL (ref 5–25)
CALCIUM SERPL-MCNC: 10.1 MG/DL (ref 8.4–10.2)
CHLORIDE SERPL-SCNC: 106 MMOL/L (ref 96–108)
CHOLEST SERPL-MCNC: 142 MG/DL (ref ?–200)
CO2 SERPL-SCNC: 24 MMOL/L (ref 21–32)
CREAT SERPL-MCNC: 0.72 MG/DL (ref 0.6–1.3)
GFR SERPL CREATININE-BSD FRML MDRD: 83 ML/MIN/1.73SQ M
GLUCOSE P FAST SERPL-MCNC: 90 MG/DL (ref 65–99)
HDLC SERPL-MCNC: 71 MG/DL
LDLC SERPL CALC-MCNC: 61 MG/DL (ref 0–100)
NONHDLC SERPL-MCNC: 71 MG/DL
POTASSIUM SERPL-SCNC: 4.2 MMOL/L (ref 3.5–5.3)
PROT SERPL-MCNC: 7.1 G/DL (ref 6.4–8.4)
SODIUM SERPL-SCNC: 140 MMOL/L (ref 135–147)
TRIGL SERPL-MCNC: 49 MG/DL (ref ?–150)

## 2025-01-20 ENCOUNTER — RESULTS FOLLOW-UP (OUTPATIENT)
Dept: FAMILY MEDICINE CLINIC | Facility: CLINIC | Age: 75
End: 2025-01-20

## 2025-02-04 ENCOUNTER — TELEPHONE (OUTPATIENT)
Dept: GASTROENTEROLOGY | Facility: CLINIC | Age: 75
End: 2025-02-04

## 2025-02-04 NOTE — TELEPHONE ENCOUNTER
Pt is due for a colonoscopy screening with Dr Henson. I lmom for pt to please call back to schedule. Recall letter mailed.

## 2025-02-11 DIAGNOSIS — J45.30 MILD PERSISTENT ASTHMA WITHOUT COMPLICATION: ICD-10-CM

## 2025-02-13 RX ORDER — FLUTICASONE FUROATE AND VILANTEROL TRIFENATATE 100; 25 UG/1; UG/1
POWDER RESPIRATORY (INHALATION)
Qty: 180 EACH | Refills: 3 | Status: SHIPPED | OUTPATIENT
Start: 2025-02-13

## 2025-02-27 ENCOUNTER — OFFICE VISIT (OUTPATIENT)
Dept: URGENT CARE | Facility: CLINIC | Age: 75
End: 2025-02-27
Payer: COMMERCIAL

## 2025-02-27 ENCOUNTER — TELEPHONE (OUTPATIENT)
Age: 75
End: 2025-02-27

## 2025-02-27 ENCOUNTER — APPOINTMENT (OUTPATIENT)
Dept: RADIOLOGY | Facility: CLINIC | Age: 75
End: 2025-02-27
Payer: COMMERCIAL

## 2025-02-27 VITALS
HEART RATE: 76 BPM | WEIGHT: 184 LBS | BODY MASS INDEX: 25.66 KG/M2 | SYSTOLIC BLOOD PRESSURE: 118 MMHG | TEMPERATURE: 98 F | OXYGEN SATURATION: 95 % | DIASTOLIC BLOOD PRESSURE: 78 MMHG | RESPIRATION RATE: 16 BRPM

## 2025-02-27 DIAGNOSIS — R05.1 ACUTE COUGH: ICD-10-CM

## 2025-02-27 DIAGNOSIS — J40 BRONCHITIS: Primary | ICD-10-CM

## 2025-02-27 PROCEDURE — 71046 X-RAY EXAM CHEST 2 VIEWS: CPT

## 2025-02-27 PROCEDURE — 99213 OFFICE O/P EST LOW 20 MIN: CPT | Performed by: PHYSICIAN ASSISTANT

## 2025-02-27 PROCEDURE — 87636 SARSCOV2 & INF A&B AMP PRB: CPT | Performed by: PHYSICIAN ASSISTANT

## 2025-02-27 RX ORDER — PREDNISONE 10 MG/1
30 TABLET ORAL DAILY
Qty: 9 TABLET | Refills: 0 | Status: SHIPPED | OUTPATIENT
Start: 2025-02-27 | End: 2025-03-02

## 2025-02-27 RX ORDER — IPRATROPIUM BROMIDE AND ALBUTEROL SULFATE 2.5; .5 MG/3ML; MG/3ML
3 SOLUTION RESPIRATORY (INHALATION) ONCE
Status: DISCONTINUED | OUTPATIENT
Start: 2025-02-27 | End: 2025-02-27

## 2025-02-27 RX ORDER — AZITHROMYCIN 250 MG/1
TABLET, FILM COATED ORAL
Qty: 6 TABLET | Refills: 0 | Status: SHIPPED | OUTPATIENT
Start: 2025-02-27 | End: 2025-03-03

## 2025-02-27 RX ORDER — BENZONATATE 100 MG/1
100 CAPSULE ORAL 3 TIMES DAILY PRN
Qty: 30 CAPSULE | Refills: 0 | Status: CANCELLED | OUTPATIENT
Start: 2025-02-27

## 2025-02-27 NOTE — PATIENT INSTRUCTIONS
"Patient Education     Acute bronchitis in adults   The Basics   Written by the doctors and editors at [facility]   What is bronchitis? -- This is an infection that causes a cough. It happens when the tubes that carry air into the lungs, called the \"bronchi,\" get infected (figure 1).  Usually, bronchitis happens after a person gets a cold or the flu. The viruses that cause the cold or flu infect the bronchi and irritate them. Antibiotics do not help bronchitis.  Bronchitis can also happen when a person gets an infection called \"whooping cough,\" but this is much less common. Whooping cough is caused by bacteria that can infect the bronchi. Most people get vaccines to prevent whooping cough, but the vaccine doesn't always work. Your doctor will be able to tell if you have whooping cough by doing an exam and listening to your cough.  This article is about \"acute\" bronchitis. This is different from \"chronic\" bronchitis, which is a lung disease that most often affects people who smoke.  What are the symptoms of bronchitis? -- The most common symptoms are:   A cough that can last up to a few weeks   Coughing up mucus that is clear, yellow, or green - Green mucus does not always mean that you have a bacterial infection.  You might also have other cold or flu symptoms, like a stuffy nose, sore throat, or headache. People with bronchitis do not usually get a fever.  Will I need tests? -- Most people with bronchitis do not need a test. But if your doctor or nurse is not sure what is causing your cough, they might do tests. For example, they might order a chest X-ray. Or if they think that you might have COVID-19, they will test you for the virus that causes the infection.  How is bronchitis treated? -- Bronchitis almost always goes away on its own. But the cough can take up to 3 weeks to get better, and sometimes even longer.  Doctors do not usually treat bronchitis with antibiotic medicines. That's because bronchitis is usually " caused by a virus, and antibiotics kill bacteria, not viruses. Also, antibiotics can actually cause other problems.  To feel better, you can treat your cold and flu symptoms. You can:   Get lots of rest, and drink plenty of liquids.   Drink hot tea.   Suck on cough drops or hard candy.   Take over-the-counter cough and cold medicines.   Breath in warm, moist air, such as in the shower, over a kettle, or from a humidifier.   Take a pain-relieving medicine if you have cold or flu symptoms like headache, muscle aches, or joint pain.  Avoid smoking or being around others who smoke. This can make your cough worse.  How can I keep from getting bronchitis again? -- You can reduce your chance of getting bronchitis again by keeping the germs that cause bronchitis out of your body. One of the best ways to do this is to wash your hands often with soap and water. If there is no sink nearby, you can use a hand gel with alcohol in it to clean your hands.  How can I keep from spreading germs? -- In addition to washing your hands often, cover your mouth with your elbow when you sneeze or cough. Using your elbow keeps you from getting germs on your hands. If you use a tissue, throw the tissue away and wash your hands.  When should I call the doctor? -- Call for advice if you have:   A fever higher than 100.4°F (38°C), or chills   Chest pain when you cough, trouble breathing, or coughing up blood   A barking cough that makes it hard to talk   Cough and weight loss that you cannot explain   Symptoms that are not getting better after 3 weeks  All topics are updated as new evidence becomes available and our peer review process is complete.  This topic retrieved from [employer] on: May 16, 2024.  Topic 07443 Version 17.0  Release: 32.4.3 - C32.135  © 2024 UpToDate, Inc. and/or its affiliates. All rights reserved.  figure 1: Normal lungs     The lungs sit in the chest, inside the ribcage. They are covered with a thin membrane called the  "\"pleura.\" The windpipe, or trachea, branches into 2 smaller airways called the left and right \"bronchi.\" The space between the lungs is called the \"mediastinum.\" Lymph nodes are located within and around the lungs and mediastinum.  Graphic 01098 Version 14.0  Consumer Information Use and Disclaimer   Disclaimer: This generalized information is a limited summary of diagnosis, treatment, and/or medication information. It is not meant to be comprehensive and should be used as a tool to help the user understand and/or assess potential diagnostic and treatment options. It does NOT include all information about conditions, treatments, medications, side effects, or risks that may apply to a specific patient. It is not intended to be medical advice or a substitute for the medical advice, diagnosis, or treatment of a health care provider based on the health care provider's examination and assessment of a patient's specific and unique circumstances. Patients must speak with a health care provider for complete information about their health, medical questions, and treatment options, including any risks or benefits regarding use of medications. This information does not endorse any treatments or medications as safe, effective, or approved for treating a specific patient. UpToDate, Inc. and its affiliates disclaim any warranty or liability relating to this information or the use thereof.The use of this information is governed by the Terms of Use, available at https://www.wolYipituwer.com/en/know/clinical-effectiveness-terms. 2024© UpToDate, Inc. and its affiliates and/or licensors. All rights reserved.  Copyright   © 2024 UpToDate, Inc. and/or its affiliates. All rights reserved.    "

## 2025-02-27 NOTE — PROGRESS NOTES
"  [facility]        NAME: Safia Moncada is a 74 y.o. female  : 1950    MRN: [id number]  DATE: 2025  TIME: 1:28 PM    Assessment and Plan   Bronchitis [J40]  1. Bronchitis  XR chest pa and lateral    predniSONE 10 mg tablet    azithromycin (ZITHROMAX) 250 mg tablet    Covid19 and INFLUENZA A/B PCR    DISCONTINUED: ipratropium-albuterol (DUO-NEB) 0.5-2.5 mg/3 mL inhalation solution 3 mL        The patient a DuoNeb while in the office today.  Patient declined stating that if needed she can take her albuterol inhaler at home.    Patient Instructions     Patient Instructions   Patient Education     Acute bronchitis in adults   The Basics   Written by the doctors and editors at [facility]   What is bronchitis? -- This is an infection that causes a cough. It happens when the tubes that carry air into the lungs, called the \"bronchi,\" get infected (figure 1).  Usually, bronchitis happens after a person gets a cold or the flu. The viruses that cause the cold or flu infect the bronchi and irritate them. Antibiotics do not help bronchitis.  Bronchitis can also happen when a person gets an infection called \"whooping cough,\" but this is much less common. Whooping cough is caused by bacteria that can infect the bronchi. Most people get vaccines to prevent whooping cough, but the vaccine doesn't always work. Your doctor will be able to tell if you have whooping cough by doing an exam and listening to your cough.  This article is about \"acute\" bronchitis. This is different from \"chronic\" bronchitis, which is a lung disease that most often affects people who smoke.  What are the symptoms of bronchitis? -- The most common symptoms are:   A cough that can last up to a few weeks   Coughing up mucus that is clear, yellow, or green - Green mucus does not always mean that you have a bacterial infection.  You might also have other cold or flu symptoms, like a stuffy nose, sore throat, or headache. People with " bronchitis do not usually get a fever.  Will I need tests? -- Most people with bronchitis do not need a test. But if your doctor or nurse is not sure what is causing your cough, they might do tests. For example, they might order a chest X-ray. Or if they think that you might have COVID-19, they will test you for the virus that causes the infection.  How is bronchitis treated? -- Bronchitis almost always goes away on its own. But the cough can take up to 3 weeks to get better, and sometimes even longer.  Doctors do not usually treat bronchitis with antibiotic medicines. That's because bronchitis is usually caused by a virus, and antibiotics kill bacteria, not viruses. Also, antibiotics can actually cause other problems.  To feel better, you can treat your cold and flu symptoms. You can:   Get lots of rest, and drink plenty of liquids.   Drink hot tea.   Suck on cough drops or hard candy.   Take over-the-counter cough and cold medicines.   Breath in warm, moist air, such as in the shower, over a kettle, or from a humidifier.   Take a pain-relieving medicine if you have cold or flu symptoms like headache, muscle aches, or joint pain.  Avoid smoking or being around others who smoke. This can make your cough worse.  How can I keep from getting bronchitis again? -- You can reduce your chance of getting bronchitis again by keeping the germs that cause bronchitis out of your body. One of the best ways to do this is to wash your hands often with soap and water. If there is no sink nearby, you can use a hand gel with alcohol in it to clean your hands.  How can I keep from spreading germs? -- In addition to washing your hands often, cover your mouth with your elbow when you sneeze or cough. Using your elbow keeps you from getting germs on your hands. If you use a tissue, throw the tissue away and wash your hands.  When should I call the doctor? -- Call for advice if you have:   A fever higher than 100.4°F (38°C), or chills    "Chest pain when you cough, trouble breathing, or coughing up blood   A barking cough that makes it hard to talk   Cough and weight loss that you cannot explain   Symptoms that are not getting better after 3 weeks  All topics are updated as new evidence becomes available and our peer review process is complete.  This topic retrieved from [employer] on: May 16, 2024.  Topic 97419 Version 17.0  Release: 32.4.3 - C32.135  © 2024 UpToDate, Inc. and/or its affiliates. All rights reserved.  figure 1: Normal lungs     The lungs sit in the chest, inside the ribcage. They are covered with a thin membrane called the \"pleura.\" The windpipe, or trachea, branches into 2 smaller airways called the left and right \"bronchi.\" The space between the lungs is called the \"mediastinum.\" Lymph nodes are located within and around the lungs and mediastinum.  Graphic 48969 Version 14.0  Consumer Information Use and Disclaimer   Disclaimer: This generalized information is a limited summary of diagnosis, treatment, and/or medication information. It is not meant to be comprehensive and should be used as a tool to help the user understand and/or assess potential diagnostic and treatment options. It does NOT include all information about conditions, treatments, medications, side effects, or risks that may apply to a specific patient. It is not intended to be medical advice or a substitute for the medical advice, diagnosis, or treatment of a health care provider based on the health care provider's examination and assessment of a patient's specific and unique circumstances. Patients must speak with a health care provider for complete information about their health, medical questions, and treatment options, including any risks or benefits regarding use of medications. This information does not endorse any treatments or medications as safe, effective, or approved for treating a specific patient. UpToDate, Inc. and its affiliates disclaim any warranty or " liability relating to this information or the use thereof.The use of this information is governed by the Terms of Use, available at https://www.wolterskluwer.com/en/know/clinical-effectiveness-terms. 2024© UpToDate, Inc. and its affiliates and/or licensors. All rights reserved.  Copyright   © 2024 UpToDate, Inc. and/or its affiliates. All rights reserved.        Follow up with PCP in 3-5 days.  Proceed to  ER if symptoms worsen.    Chief Complaint     Chief Complaint   Patient presents with    Cold Like Symptoms     PT PRESENTS WITH chills/sweats, headache, fatigue, decreased appetite, SOB with activity, cough; started Sunday         History of Present Illness       The patient is a 74-year-old female presenting today for a cough, shortness of breath with activity, decreased appetite, headache, fatigue, chills and diaphoresis.  She stated that symptoms started 5 days ago and the cough and chest congestion have not improved.  She feels short of breath with activity.  She has not used her albuterol inhaler because she did not feel like she was wheezing.        Review of Systems   Review of Systems   Constitutional:  Positive for chills, diaphoresis and fatigue. Negative for activity change, appetite change and fever.   HENT:  Negative for congestion, ear pain, rhinorrhea, sinus pressure, sinus pain and sore throat.    Eyes:  Negative for pain and visual disturbance.   Respiratory:  Positive for chest tightness and shortness of breath. Negative for cough.    Cardiovascular:  Negative for chest pain and palpitations.   Gastrointestinal:  Negative for abdominal pain, diarrhea, nausea and vomiting.   Genitourinary:  Negative for dysuria and hematuria.   Musculoskeletal:  Negative for arthralgias, back pain and myalgias.   Skin:  Negative for color change, pallor and rash.   Neurological:  Positive for headaches. Negative for seizures and syncope.   All other systems reviewed and are negative.        Current Medications        Current Outpatient Medications:     albuterol (PROVENTIL HFA,VENTOLIN HFA) 90 mcg/act inhaler, USE 2 INHALATIONS BY MOUTH EVERY 4 HOURS AS NEEDED FOR WHEEZING, Disp: 26.8 g, Rfl: 4    azithromycin (ZITHROMAX) 250 mg tablet, Take 2 tablets today then 1 tablet daily x 4 days, Disp: 6 tablet, Rfl: 0    Breo Ellipta 100-25 MCG/ACT inhaler, USE 1 INHALATION BY MOUTH ONCE  DAILY AT THE SAME TIME EACH DAY  RINSE MOUTH AFTER USE, Disp: 180 each, Rfl: 3    losartan (COZAAR) 50 mg tablet, TAKE 1 TABLET BY MOUTH DAILY, Disp: 90 tablet, Rfl: 3    multivitamin (THERAGRAN) TABS, Take 1 tablet by mouth daily, Disp: , Rfl:     predniSONE 10 mg tablet, Take 3 tablets (30 mg total) by mouth daily for 3 days, Disp: 9 tablet, Rfl: 0    PreviDent 5000 Enamel Protect 1.1-5 % GEL, , Disp: , Rfl:     rosuvastatin (CRESTOR) 10 MG tablet, TAKE 1 TABLET BY MOUTH DAILY AT  BEDTIME, Disp: 90 tablet, Rfl: 3  No current facility-administered medications for this visit.    Current Allergies     Allergies as of 02/27/2025 - Reviewed 02/27/2025   Allergen Reaction Noted    Molds & smuts Shortness Of Breath 07/05/2018            The following portions of the patient's history were reviewed and updated as appropriate: allergies, current medications, past family history, past medical history, past social history, past surgical history and problem list.     Past Medical History:   Diagnosis Date    Abnormal inflammatory bowel disease panel     last assessed: 09/03/14    Allergic     Asthma     Chest wall contusion     last assessed: 04/21/17    Dermatomycosis 08/02/2011    HL (hearing loss)     Hordeolum internum of left eye     last assessed: 06/20/13    Hx of oral aphthous ulcers 08/22/2011    Hypercalcemia 01/11/2011    Hyperlipidemia     Hypertension     Sciatica 05/02/2008    Ulceration of intestine     last assessed: 9/03/14       Past Surgical History:   Procedure Laterality Date    MYRINGOTOMY Bilateral     TONSILLECTOMY         Family  History   Problem Relation Age of Onset    No Known Problems Mother         macular degeneration per allscripts    Coronary artery disease Father     Asthma Father     No Known Problems Maternal Aunt     No Known Problems Paternal Aunt          Medications have been verified.        Objective   /78   Pulse 76   Temp 98 °F (36.7 °C) (Tympanic)   Resp 16   Wt 83.5 kg (184 lb)   SpO2 95%   BMI 25.66 kg/m²        Physical Exam     Physical Exam  Vitals and nursing note reviewed.   Constitutional:       General: She is not in acute distress.     Appearance: Normal appearance. She is well-developed and normal weight. She is not ill-appearing, toxic-appearing or diaphoretic.   HENT:      Head: Normocephalic and atraumatic.      Right Ear: Tympanic membrane, ear canal and external ear normal. No drainage, swelling or tenderness. No middle ear effusion. There is no impacted cerumen. Tympanic membrane is not erythematous.      Left Ear: Tympanic membrane, ear canal and external ear normal. No drainage, swelling or tenderness.  No middle ear effusion. There is no impacted cerumen. Tympanic membrane is not erythematous.      Nose: Nose normal. No congestion or rhinorrhea.      Mouth/Throat:      Mouth: Mucous membranes are moist. No oral lesions.      Pharynx: Oropharynx is clear. Uvula midline. No pharyngeal swelling, oropharyngeal exudate, posterior oropharyngeal erythema or uvula swelling.      Tonsils: No tonsillar exudate or tonsillar abscesses. 0 on the right. 0 on the left.   Eyes:      Extraocular Movements:      Right eye: Normal extraocular motion.      Left eye: Normal extraocular motion.      Conjunctiva/sclera: Conjunctivae normal.      Pupils: Pupils are equal, round, and reactive to light.   Cardiovascular:      Rate and Rhythm: Normal rate and regular rhythm.      Heart sounds: Normal heart sounds. No murmur heard.     No friction rub. No gallop.   Pulmonary:      Effort: Pulmonary effort is normal.  No respiratory distress.      Breath sounds: No stridor. Rhonchi (cleared with coughing) present. No wheezing or rales.   Chest:      Chest wall: No tenderness.   Musculoskeletal:         General: Normal range of motion.   Skin:     General: Skin is warm and dry.      Capillary Refill: Capillary refill takes less than 2 seconds.   Neurological:      Mental Status: She is alert.

## 2025-02-27 NOTE — TELEPHONE ENCOUNTER
Patient called in and would like to see Dr. Bishop today. Stated feeling congested , no energy and shaky. No available appointment.  Please advise. Thank you

## 2025-02-27 NOTE — TELEPHONE ENCOUNTER
Spoke with pt. I advised there are no appointments available today and only Same Day for tomorrow. I advised her to go to urgent care, or try calling early in the am for a same day. Pt understands, but is unhappy.

## 2025-02-28 LAB
FLUAV RNA RESP QL NAA+PROBE: POSITIVE
FLUBV RNA RESP QL NAA+PROBE: NEGATIVE
SARS-COV-2 RNA RESP QL NAA+PROBE: NEGATIVE

## 2025-03-24 DIAGNOSIS — E78.00 HYPERCHOLESTEROLEMIA: ICD-10-CM

## 2025-03-25 RX ORDER — ROSUVASTATIN CALCIUM 10 MG/1
10 TABLET, COATED ORAL
Qty: 90 TABLET | Refills: 1 | Status: SHIPPED | OUTPATIENT
Start: 2025-03-25